# Patient Record
Sex: MALE | Race: WHITE | NOT HISPANIC OR LATINO | Employment: OTHER | URBAN - NONMETROPOLITAN AREA
[De-identification: names, ages, dates, MRNs, and addresses within clinical notes are randomized per-mention and may not be internally consistent; named-entity substitution may affect disease eponyms.]

---

## 2017-01-16 ENCOUNTER — APPOINTMENT (OUTPATIENT)
Dept: GENERAL RADIOLOGY | Facility: HOSPITAL | Age: 57
End: 2017-01-16

## 2017-01-16 ENCOUNTER — HOSPITAL ENCOUNTER (EMERGENCY)
Facility: HOSPITAL | Age: 57
Discharge: HOME | End: 2017-01-16
Admitting: NURSE PRACTITIONER

## 2017-01-16 VITALS
HEIGHT: 72 IN | HEART RATE: 89 BPM | OXYGEN SATURATION: 98 % | SYSTOLIC BLOOD PRESSURE: 145 MMHG | RESPIRATION RATE: 18 BRPM | WEIGHT: 197.25 LBS | DIASTOLIC BLOOD PRESSURE: 90 MMHG | BODY MASS INDEX: 26.72 KG/M2 | TEMPERATURE: 99.4 F

## 2017-01-16 DIAGNOSIS — IMO0002 LACERATION: Primary | ICD-10-CM

## 2017-01-16 PROCEDURE — 73120 X-RAY EXAM OF HAND: CPT

## 2017-01-16 PROCEDURE — 90715 TDAP VACCINE 7 YRS/> IM: CPT | Performed by: NURSE PRACTITIONER

## 2017-01-16 PROCEDURE — 25010000002 TDAP 5-2.5-18.5 LF-MCG/0.5 SUSPENSION: Performed by: NURSE PRACTITIONER

## 2017-01-16 PROCEDURE — 90471 IMMUNIZATION ADMIN: CPT | Performed by: NURSE PRACTITIONER

## 2017-01-16 PROCEDURE — 99282 EMERGENCY DEPT VISIT SF MDM: CPT

## 2017-01-16 RX ORDER — CETIRIZINE HYDROCHLORIDE 10 MG/1
10 TABLET ORAL DAILY
COMMUNITY

## 2017-01-16 RX ORDER — CEPHALEXIN 500 MG/1
500 CAPSULE ORAL 3 TIMES DAILY
Qty: 21 CAPSULE | Refills: 0 | Status: SHIPPED | OUTPATIENT
Start: 2017-01-16 | End: 2017-01-23

## 2017-01-16 RX ORDER — GABAPENTIN 800 MG/1
800 TABLET ORAL 4 TIMES DAILY
COMMUNITY
End: 2017-06-05

## 2017-01-16 RX ORDER — CLARITHROMYCIN 500 MG/1
500 TABLET, COATED ORAL 2 TIMES DAILY
COMMUNITY
End: 2017-06-05

## 2017-01-16 RX ORDER — NAPROXEN 500 MG/1
500 TABLET ORAL 2 TIMES DAILY PRN
Qty: 10 TABLET | Refills: 0 | Status: SHIPPED | OUTPATIENT
Start: 2017-01-16 | End: 2017-01-21

## 2017-01-16 RX ORDER — ROSUVASTATIN CALCIUM 10 MG/1
10 TABLET, COATED ORAL DAILY
COMMUNITY
End: 2021-04-05

## 2017-01-16 RX ORDER — OXYCODONE AND ACETAMINOPHEN 10; 325 MG/1; MG/1
1 TABLET ORAL EVERY 6 HOURS PRN
COMMUNITY
End: 2017-06-05

## 2017-01-16 RX ORDER — LIDOCAINE HYDROCHLORIDE 10 MG/ML
10 INJECTION, SOLUTION INFILTRATION; PERINEURAL ONCE
Status: COMPLETED | OUTPATIENT
Start: 2017-01-16 | End: 2017-01-16

## 2017-01-16 RX ORDER — HYDROCODONE BITARTRATE AND ACETAMINOPHEN 5; 325 MG/1; MG/1
1 TABLET ORAL EVERY 6 HOURS PRN
Qty: 8 TABLET | Refills: 0 | Status: SHIPPED | OUTPATIENT
Start: 2017-01-16 | End: 2017-01-18

## 2017-01-16 RX ADMIN — LIDOCAINE HYDROCHLORIDE 10 ML: 10 INJECTION, SOLUTION INFILTRATION; PERINEURAL at 10:49

## 2017-01-16 RX ADMIN — TETANUS TOXOID, REDUCED DIPHTHERIA TOXOID AND ACELLULAR PERTUSSIS VACCINE, ADSORBED 0.5 ML: 5; 2.5; 8; 8; 2.5 SUSPENSION INTRAMUSCULAR at 10:46

## 2017-01-16 NOTE — DISCHARGE INSTRUCTIONS
Please f/u with Dr. Maguire on Thursday; please call his office for an appt  R/t to ER as needed, sooner if any new or worsening symptoms  Keep affected area clean and dry, may wash with warm, soapy water. Use splint to help support finger.   Sutures will need to be removed in 7 days unless otherwise specified by Dr. Maguire.

## 2017-01-16 NOTE — ED PROVIDER NOTES
Subjective   HPI Comments: She is a 56-year-old white male who presents ER today with a laceration to the base of his right thumb.  Patient reports that he was tearing down a bookcase earlier this morning when he sustained a     Patient is a 56 y.o. male presenting with skin laceration.   History provided by:  Patient   used: No    Laceration   Location:  Finger  Finger laceration location:  R thumb  Length:  6cm  Depth:  Through dermis  Quality: jagged    Bleeding: venous    Time since incident:  1 hour  Laceration mechanism:  Nail  Pain details:     Quality:  Aching    Severity:  Mild    Timing:  Constant    Progression:  Worsening  Foreign body present:  No foreign bodies  Relieved by:  Pressure  Worsened by:  Movement  Tetanus status:  Unknown  Associated symptoms: focal weakness    Associated symptoms: no fever, no numbness, no rash, no redness, no swelling and no streaking        Review of Systems   Constitutional: Negative for fever.   HENT: Negative for drooling.    Eyes: Negative for redness and itching.   Respiratory: Negative for cough, chest tightness and shortness of breath.    Endocrine: Positive for heat intolerance.   Skin: Positive for wound. Negative for rash.   Neurological: Positive for focal weakness. Negative for numbness and headaches.   Psychiatric/Behavioral: Negative for confusion and hallucinations.   All other systems reviewed and are negative.      Past Medical History   Diagnosis Date   • Hyperlipidemia        No Known Allergies    Past Surgical History   Procedure Laterality Date   • Sinus surgery         History reviewed. No pertinent family history.    Social History     Social History   • Marital status:      Spouse name: N/A   • Number of children: N/A   • Years of education: N/A     Social History Main Topics   • Smoking status: Current Every Day Smoker   • Smokeless tobacco: None   • Alcohol use No   • Drug use: No   • Sexual activity: Defer     Other  "Topics Concern   • None     Social History Narrative   • None           Objective   Physical Exam   Constitutional: He is oriented to person, place, and time. He appears well-developed and well-nourished.   HENT:   Head: Normocephalic and atraumatic.   Eyes: Conjunctivae are normal. Pupils are equal, round, and reactive to light.   Neck: Normal range of motion. Neck supple.   Cardiovascular: Normal rate, regular rhythm and normal heart sounds.    Pulmonary/Chest: Effort normal and breath sounds normal.   Abdominal: Soft. Bowel sounds are normal.   Musculoskeletal:        Arms:  Neurological: He is alert and oriented to person, place, and time.   Skin: Skin is dry.        Psychiatric: He has a normal mood and affect.   Nursing note and vitals reviewed.      Laceration Repair  Date/Time: 1/16/2017 10:49 AM  Performed by: BHARATH SALDIVAR  Authorized by: BHARATH SALDIVAR   Consent: Verbal consent obtained. Written consent not obtained.  Risks and benefits: risks, benefits and alternatives were discussed  Consent given by: patient  Patient understanding: patient states understanding of the procedure being performed  Patient consent: the patient's understanding of the procedure matches consent given  Procedure consent: procedure consent matches procedure scheduled  Relevant documents: relevant documents present and verified  Test results: test results available and properly labeled  Site marked: the operative site was marked  Imaging studies: imaging studies available  Required items: required blood products, implants, devices, and special equipment available  Patient identity confirmed: verbally with patient and arm band  Time out: Immediately prior to procedure a \"time out\" was called to verify the correct patient, procedure, equipment, support staff and site/side marked as required.  Body area: upper extremity  Location details: right thumb  Laceration length: 6 cm  Foreign bodies: no foreign bodies  Tendon " involvement: none  Nerve involvement: none  Vascular damage: no  Anesthesia: local infiltration    Anesthesia:  Anesthesia: local infiltration  Local Anesthetic: lidocaine 1% without epinephrine   Anesthetic total: 7 mL  Sedation:  Patient sedated: no    Preparation: Patient was prepped and draped in the usual sterile fashion.  Irrigation solution: saline  Irrigation method: syringe  Amount of cleaning: standard  Debridement: none  Degree of undermining: none  Skin closure: 5-0 nylon  Number of sutures: 7  Technique: simple  Approximation: loose  Approximation difficulty: simple  Dressing: 4x4 sterile gauze and antibiotic ointment  Patient tolerance: Patient tolerated the procedure well with no immediate complications               ED Course  ED Course   Comment By Time   Right hand xray: 1. Fusion of the interphalangeal joint right thumb is noted.  2. No acute abnormalities identified.  3. Hypertrophic degenerative change PIP joint right index and right long  fingers. Deneen Thao, APRN 01/16 1120   Pt will be DC home at this time in stable cond. See procedure note for suture placement.Pt case was discussed with Dr. Maguire, surgeon on call. Did discuss with Dr. Maguire decreased sensation noted to right thumb. He advised to suture laceration loosely and for pt to f/u at his office on Thurs. Pt placed on ABX, given RX for short course of pain medication. Advised to f/u with Dr. Maguire on Thurs, given information to call for appt. Pt DC home at this time in stable cond. Deneen Thao, APRN 01/25 0757                  MDM  Number of Diagnoses or Management Options  Laceration: new and requires workup     Amount and/or Complexity of Data Reviewed  Tests in the radiology section of CPT®: ordered and reviewed  Discuss the patient with other providers: yes  Independent visualization of images, tracings, or specimens: yes    Patient Progress  Patient progress: stable      Final diagnoses:   Laceration             Deneen Thao, APRN  01/25/17 0801

## 2017-06-02 RX ORDER — TAMSULOSIN HYDROCHLORIDE 0.4 MG/1
CAPSULE ORAL
Qty: 30 CAPSULE | Refills: 0 | OUTPATIENT
Start: 2017-06-02

## 2017-06-05 ENCOUNTER — OFFICE VISIT (OUTPATIENT)
Dept: FAMILY MEDICINE CLINIC | Facility: CLINIC | Age: 57
End: 2017-06-05

## 2017-06-05 VITALS
OXYGEN SATURATION: 98 % | WEIGHT: 198.4 LBS | HEIGHT: 72 IN | TEMPERATURE: 98.1 F | HEART RATE: 82 BPM | SYSTOLIC BLOOD PRESSURE: 124 MMHG | BODY MASS INDEX: 26.87 KG/M2 | RESPIRATION RATE: 18 BRPM | DIASTOLIC BLOOD PRESSURE: 78 MMHG

## 2017-06-05 DIAGNOSIS — J01.01 ACUTE RECURRENT MAXILLARY SINUSITIS: Primary | ICD-10-CM

## 2017-06-05 PROCEDURE — 99203 OFFICE O/P NEW LOW 30 MIN: CPT | Performed by: FAMILY MEDICINE

## 2017-06-05 PROCEDURE — 96372 THER/PROPH/DIAG INJ SC/IM: CPT | Performed by: FAMILY MEDICINE

## 2017-06-05 RX ORDER — TRIAMCINOLONE ACETONIDE 55 UG/1
2 SPRAY, METERED NASAL DAILY
COMMUNITY
End: 2017-08-24

## 2017-06-05 RX ORDER — FLUTICASONE PROPIONATE 50 MCG
2 SPRAY, SUSPENSION (ML) NASAL DAILY
COMMUNITY
End: 2017-07-14 | Stop reason: SDUPTHER

## 2017-06-05 RX ORDER — AMOXICILLIN AND CLAVULANATE POTASSIUM 875; 125 MG/1; MG/1
1 TABLET, FILM COATED ORAL 2 TIMES DAILY
Qty: 20 TABLET | Refills: 0 | Status: SHIPPED | OUTPATIENT
Start: 2017-06-05 | End: 2017-07-14

## 2017-06-05 RX ORDER — DEXAMETHASONE SODIUM PHOSPHATE 10 MG/ML
10 INJECTION INTRAMUSCULAR; INTRAVENOUS ONCE
Status: COMPLETED | OUTPATIENT
Start: 2017-06-05 | End: 2017-06-05

## 2017-06-05 RX ADMIN — DEXAMETHASONE SODIUM PHOSPHATE 10 MG: 10 INJECTION INTRAMUSCULAR; INTRAVENOUS at 10:17

## 2017-06-05 NOTE — PROGRESS NOTES
Chief Complaint   Patient presents with   • Sinus Problem     Pt states that he has drainage and hurt behind both eyes and in the R ear.  Pt states that this has been going on for a couple of weeks. Pt has had 5 sinus surgeries and has been on Flonase and Nasacort for years.        History:  Shant Arevalo is a 57 y.o. male presents who today for evaluation of the above problems.  PCP currently listed as No Known Provider.   Sinus symptoms over past 2 weeks.  He has had 5 surgeries.  He has regular sinus symptoms seasonally.  This is worse this year than last year.  He is using zyrtec, flonase 2x daily.  He has never been on singulair.  He has never seen allergist.  Last surgery of sinuses 12 years ago.  Sinus pain, pressure, drainage along medial nasal bridge and bilateral ears.  He forgets about this as he is busy but sx are not getting better.  OTC sudafed is not helping much.  If he did not take it it would be markedly worse.  Not fixing symptoms but keeping sx from worsening.  Maxillary tooth discomfort.  Thickened mucus, increased drainage.  Continuous HA behind eys, frontal. 4/10 throbbing aching, non radiating.  No photphobia, no phonophobia.  Generally uses biaxin for abx.  I discussed that augmentin is typically first line agent.  Macrolides are not even second line.      Shant Arevalo  has a past medical history of Allergic; Hyperlipidemia; and Hypogonadism in male.    No Known Allergies  Past Medical History:   Diagnosis Date   • Allergic    • Hyperlipidemia    • Hypogonadism in male      Past Surgical History:   Procedure Laterality Date   • SINUS SURGERY      x5     Family History   Problem Relation Age of Onset   • Heart disease Mother    • Cancer Mother      breast   • Breast cancer Mother    • Diabetes Mother    • No Known Problems Father    • No Known Problems Brother    • Colon cancer Neg Hx    • Prostate cancer Neg Hx    • Alcohol abuse Neg Hx    • Drug abuse Neg Hx    • Thyroid cancer Neg Hx    •  Thyroid disease Neg Hx        Current Outpatient Prescriptions on File Prior to Visit   Medication Sig Dispense Refill   • cetirizine (zyrTEC) 10 MG tablet Take 10 mg by mouth Daily.     • Iron-Vitamins (GERITOL PO) Take  by mouth.     • rosuvastatin (CRESTOR) 10 MG tablet Take 10 mg by mouth Daily.     • Testosterone Cypionate 200 MG/ML kit Inject  into the shoulder, thigh, or buttocks 1 (One) Time Per Week.     • [DISCONTINUED] clarithromycin (BIAXIN) 500 MG tablet Take 500 mg by mouth 2 (Two) Times a Day.     • [DISCONTINUED] gabapentin (NEURONTIN) 800 MG tablet Take 800 mg by mouth 4 (Four) Times a Day.     • [DISCONTINUED] oxyCODONE-acetaminophen (PERCOCET)  MG per tablet Take 1 tablet by mouth Every 6 (Six) Hours As Needed for moderate pain (4-6).       No current facility-administered medications on file prior to visit.        Family history, surgical history, past medical history, Allergies and meds reviewed with patient today and updated in [facility] EMR.     ROS:  Review of Systems   Constitutional: Negative for activity change, appetite change, diaphoresis, fatigue and fever.   HENT: Positive for congestion, postnasal drip, rhinorrhea, sinus pressure, sneezing, sore throat and trouble swallowing.    Eyes: Negative for photophobia, discharge, redness, itching and visual disturbance.   Respiratory: Negative for cough, chest tightness, shortness of breath and wheezing.    Cardiovascular: Negative for chest pain and leg swelling.   Gastrointestinal: Negative for abdominal pain, constipation, diarrhea, nausea and vomiting.   Genitourinary: Negative for decreased urine volume, difficulty urinating, flank pain and hematuria.   Musculoskeletal: Negative for back pain and neck pain.   Skin: Negative for color change and rash.   Neurological: Negative for dizziness, speech difficulty, weakness, numbness and headaches.   Psychiatric/Behavioral: Negative for agitation, behavioral problems, confusion, decreased  "concentration and hallucinations. The patient is not nervous/anxious.        OBJECTIVE:  Vitals:    06/05/17 0857   BP: 124/78   BP Location: Left arm   Patient Position: Sitting   Cuff Size: Adult   Pulse: 82   Resp: 18   Temp: 98.1 °F (36.7 °C)   TempSrc: Oral   SpO2: 98%   Weight: 198 lb 6.4 oz (90 kg)   Height: 72\" (182.9 cm)     Physical Exam   Constitutional: He is oriented to person, place, and time. He appears well-developed and well-nourished.   HENT:   Head: Normocephalic and atraumatic.   Right Ear: External ear normal.   Left Ear: External ear normal.   Nose: Mucosal edema, rhinorrhea and sinus tenderness present. No nasal deformity or septal deviation. Right sinus exhibits maxillary sinus tenderness. Left sinus exhibits maxillary sinus tenderness.   Mouth/Throat: Uvula is midline. Posterior oropharyngeal erythema present. No oropharyngeal exudate, posterior oropharyngeal edema or tonsillar abscesses.   Eyes: Conjunctivae and EOM are normal. Pupils are equal, round, and reactive to light.   Neck: Normal range of motion. Neck supple.   Cardiovascular: Normal rate, regular rhythm, normal heart sounds and intact distal pulses.    Pulmonary/Chest: Effort normal and breath sounds normal. No accessory muscle usage. No apnea. No respiratory distress. He has no decreased breath sounds. He has no wheezes. He has no rhonchi. He has no rales.   Abdominal: Soft. Normal appearance and bowel sounds are normal. There is no tenderness.   Musculoskeletal: He exhibits no edema, tenderness or deformity.   Lymphadenopathy:     He has no cervical adenopathy.   Neurological: He is alert and oriented to person, place, and time.   Skin: Skin is warm and dry.   Psychiatric: He has a normal mood and affect. His behavior is normal. Judgment and thought content normal.   Vitals reviewed.      Assessment/Plan:  Acute recurrent maxillary sinusitis: Sinusitis: Acute rhinosinusitis with symptoms <4 weeks.  Ddx includes viral URI to " include corona, adeno, parainfluenza, rhinovirus, noninfectious rhinitis (vasomotor and allergic) and bacterial rhinosinusitis.  Discussed ddx and discussed when abx are recommended and when not and discussed treatment options with patient today. Reviewed typically no abx are recommended until day 7-10.  Offered deferred abx as best line if sx present <7 days.   Allergies to medications and abx reviewed.  The patient voiced understanding and agrees to listed therapy.  Steroid injection R/B/A d/w patient and offered to be given today.  Steroids by mouth discussed as well.  Discussed benefits of nasal steroid and to consider daily second gen antihistamine.  Discussed risks/benefits of OTC decongestants.  Caution with blood pressure.  Consider netti pot. f/u in 1-2 weeks if not improving.  a. Reviewed R/B/A Injections d/w patient.   b. Offered handout on sinus infections to patient.  c. Allergy recommendations discussed.  Would change pillowcases and wash with hot/dry hot 2x weekly and change sheets minimum weekly. Consider anti-allergenic coverings for sheets/pillowcases.  Avoid tobacco, Keep pets out of room of sleeping as able.  Use home circulation instead of windows down.  Nasal steroids discussed today.  d. Risks/benefits of abx and steroids discussed with patient today.  i. Handouts offered on medications.   ii. Take abx with food to decrease risks of diarrhea. Increased yogurt to decrease this risk further  iii. Consider probiotics.  iv. Decadron, dexamethasone, methylprednisolone, prednisone. Pt notified of potential pros/risks of steroid treatment including rapid improvement of condition; allergic reaction, psychologic reaction (depression, anxiety & insomnia), skin change at injection site (color, dimpling), muscle weakness, changes in blood sugar, cataracts/glaucoma, AVN.  This list is not all inclusive and patient is aware they may refuse treatment.  e. Post infectious cough discussed with patient. Sx may  last 21 days after URI/Sinus infection.  They can call if needed over next 1 week and we will consider proair/Ventolin and Tessalon.  For now no medications.  f. Nasal GC d/w patient. R/B/A to meds d/w patient, SE reviewed, handout offered from [facility] regarding medications listed.       ORDERS:  -     amoxicillin-clavulanate (AUGMENTIN) 875-125 MG per tablet; Take 1 tablet by mouth 2 (Two) Times a Day.  -     dexamethasone (DECADRON) injection 10 mg; Inject 1 mL into the shoulder, thigh, or buttocks 1 (One) Time.    BMI 26.0-26.9,adult: Discussed to monitor portions, monitor activity.  He has weighed this a long time.  He was up to 230-240 historically lifting weights and has been around 200 after stopping.  He is not interested in any w/u for now.       Risks/benefits of current and new medications discussed with the patient and or family today.  The patient/family are aware and accept that if there any side effects they should call or return to clinic as soon as possible.  Appropriate F/U discussed for topics addressed today. All questions were answered to the satisfactory state of patient/family.  Should symptoms fail to improve or worsen they agree to call or return to clinic or to go to the ER. Education handouts were offered on any new Rx if requested.  Discussed the importance of following up with any needed screening tests/labs/specialist appointments and any requested follow-up recommended by me today.  Importance of maintaining follow-up discussed and patient accepts that missed appointments can delay diagnosis and potentially lead to worsening of conditions.    An After Visit Summary was printed and given to the patient at discharge.  Follow-up: Return if symptoms worsen or fail to improve, for F/U in 3-4 months for well visit. , Annual physical.         Napoleon Regan M.D. 6/5/2017

## 2017-06-05 NOTE — PATIENT INSTRUCTIONS
Sinusitis, Adult  Sinusitis is soreness and inflammation of your sinuses. Sinuses are hollow spaces in the bones around your face. Your sinuses are located:  · Around your eyes.  · In the middle of your forehead.  · Behind your nose.  · In your cheekbones.  Your sinuses and nasal passages are lined with a stringy fluid (mucus). Mucus normally drains out of your sinuses. When your nasal tissues become inflamed or swollen, the mucus can become trapped or blocked so air cannot flow through your sinuses. This allows bacteria, viruses, and funguses to grow, which leads to infection.  Sinusitis can develop quickly and last for 7-10 days (acute) or for more than 12 weeks (chronic). Sinusitis often develops after a cold.  CAUSES  This condition is caused by anything that creates swelling in the sinuses or stops mucus from draining, including:  · Allergies.  · Asthma.  · Bacterial or viral infection.  · Abnormally shaped bones between the nasal passages.  · Nasal growths that contain mucus (nasal polyps).  · Narrow sinus openings.  · Pollutants, such as chemicals or irritants in the air.  · A foreign object stuck in the nose.  · A fungal infection. This is rare.  RISK FACTORS  The following factors may make you more likely to develop this condition:  · Having allergies or asthma.  · Having had a recent cold or respiratory tract infection.  · Having structural deformities or blockages in your nose or sinuses.  · Having a weak immune system.  · Doing a lot of swimming or diving.  · Overusing nasal sprays.  · Smoking.  SYMPTOMS  The main symptoms of this condition are pain and a feeling of pressure around the affected sinuses. Other symptoms include:  · Upper toothache.  · Earache.  · Headache.  · Bad breath.  · Decreased sense of smell and taste.  · A cough that may get worse at night.  · Fatigue.  · Fever.  · Thick drainage from your nose. The drainage is often green and it may contain pus (purulent).  · Stuffy nose or  congestion.  · Postnasal drip. This is when extra mucus collects in the throat or back of the nose.  · Swelling and warmth over the affected sinuses.  · Sore throat.  · Sensitivity to light.  DIAGNOSIS  This condition is diagnosed based on symptoms, a medical history, and a physical exam. To find out if your condition is acute or chronic, your health care provider may:  · Look in your nose for signs of nasal polyps.  · Tap over the affected sinus to check for signs of infection.  · View the inside of your sinuses using an imaging device that has a light attached (endoscope).  If your health care provider suspects that you have chronic sinusitis, you may also:  · Be tested for allergies.  · Have a sample of mucus taken from your nose (nasal culture) and checked for bacteria.  · Have a mucus sample examined to see if your sinusitis is related to an allergy.  If your sinusitis does not respond to treatment and it lasts longer than 8 weeks, you may have an MRI or CT scan to check your sinuses. These scans also help to determine how severe your infection is.  In rare cases, a bone biopsy may be done to rule out more serious types of fungal sinus disease.  TREATMENT  Treatment for sinusitis depends on the cause and whether your condition is chronic or acute. If a virus is causing your sinusitis, your symptoms will go away on their own within 10 days. You may be given medicines to relieve your symptoms, including:  · Topical nasal decongestants. They shrink swollen nasal passages and let mucus drain from your sinuses.  · Antihistamines. These drugs block inflammation that is triggered by allergies. This can help to ease swelling in your nose and sinuses.  · Topical nasal corticosteroids. These are nasal sprays that ease inflammation and swelling in your nose and sinuses.  · Nasal saline washes. These rinses can help to get rid of thick mucus in your nose.  If your condition is caused by bacteria, you will be given an  antibiotic medicine. If your condition is caused by a fungus, you will be given an antifungal medicine.  Surgery may be needed to correct underlying conditions, such as narrow nasal passages. Surgery may also be needed to remove polyps.  HOME CARE INSTRUCTIONS  Medicines  · Take, use, or apply over-the-counter and prescription medicines only as told by your health care provider. These may include nasal sprays.  · If you were prescribed an antibiotic medicine, take it as told by your health care provider. Do not stop taking the antibiotic even if you start to feel better.  Hydrate and Humidify  · Drink enough water to keep your urine clear or pale yellow. Staying hydrated will help to thin your mucus.  · Use a cool mist humidifier to keep the humidity level in your home above 50%.  · Inhale steam for 10-15 minutes, 3-4 times a day or as told by your health care provider. You can do this in the bathroom while a hot shower is running.  · Limit your exposure to cool or dry air.  Rest  · Rest as much as possible.  · Sleep with your head raised (elevated).  · Make sure to get enough sleep each night.  General Instructions  · Apply a warm, moist washcloth to your face 3-4 times a day or as told by your health care provider. This will help with discomfort.  · Wash your hands often with soap and water to reduce your exposure to viruses and other germs. If soap and water are not available, use hand .  · Do not smoke. Avoid being around people who are smoking (secondhand smoke).  · Keep all follow-up visits as told by your health care provider. This is important.  SEEK MEDICAL CARE IF:  · You have a fever.  · Your symptoms get worse.  · Your symptoms do not improve within 10 days.  SEEK IMMEDIATE MEDICAL CARE IF:  · You have a severe headache.  · You have persistent vomiting.  · You have pain or swelling around your face or eyes.  · You have vision problems.  · You develop confusion.  · Your neck is stiff.  · You have  trouble breathing.     This information is not intended to replace advice given to you by your health care provider. Make sure you discuss any questions you have with your health care provider.     Document Released: 12/18/2006 Document Revised: 04/10/2017 Document Reviewed: 10/12/2016  [employer] Interactive Patient Education ©2017 [employer]  Amoxicillin; Clavulanic Acid tablets  What is this medicine?  AMOXICILLIN; CLAVULANIC ACID (a mox i RENITA in; MANNY juanabrigida kiser ic AS id) is a penicillin antibiotic. It is used to treat certain kinds of bacterial infections. It will not work for colds, flu, or other viral infections.  This medicine may be used for other purposes; ask your health care provider or pharmacist if you have questions.  COMMON BRAND NAME(S): Augmentin  What should I tell my health care provider before I take this medicine?  They need to know if you have any of these conditions:  -bowel disease, like colitis  -kidney disease  -liver disease  -mononucleosis  -an unusual or allergic reaction to amoxicillin, penicillin, cephalosporin, other antibiotics, clavulanic acid, other medicines, foods, dyes, or preservatives  -pregnant or trying to get pregnant  -breast-feeding  How should I use this medicine?  Take this medicine by mouth with a full glass of water. Follow the directions on the prescription label. Take at the start of a meal. Do not crush or chew. If the tablet has a score line, you may cut it in half at the score line for easier swallowing. Take your medicine at regular intervals. Do not take your medicine more often than directed. Take all of your medicine as directed even if you think you are better. Do not skip doses or stop your medicine early.  Talk to your pediatrician regarding the use of this medicine in children. Special care may be needed.  Overdosage: If you think you have taken too much of this medicine contact a poison control center or emergency room at once.  NOTE: This medicine is  only for you. Do not share this medicine with others.  What if I miss a dose?  If you miss a dose, take it as soon as you can. If it is almost time for your next dose, take only that dose. Do not take double or extra doses.  What may interact with this medicine?  -allopurinol  -anticoagulants  -birth control pills  -methotrexate  -probenecid  This list may not describe all possible interactions. Give your health care provider a list of all the medicines, herbs, non-prescription drugs, or dietary supplements you use. Also tell them if you smoke, drink alcohol, or use illegal drugs. Some items may interact with your medicine.  What should I watch for while using this medicine?  Tell your doctor or health care professional if your symptoms do not improve.  Do not treat diarrhea with over the counter products. Contact your doctor if you have diarrhea that lasts more than 2 days or if it is severe and watery.  If you have diabetes, you may get a false-positive result for sugar in your urine. Check with your doctor or health care professional.  Birth control pills may not work properly while you are taking this medicine. Talk to your doctor about using an extra method of birth control.  What side effects may I notice from receiving this medicine?  Side effects that you should report to your doctor or health care professional as soon as possible:  -allergic reactions like skin rash, itching or hives, swelling of the face, lips, or tongue  -breathing problems  -dark urine  -fever or chills, sore throat  -redness, blistering, peeling or loosening of the skin, including inside the mouth  -seizures  -trouble passing urine or change in the amount of urine  -unusual bleeding, bruising  -unusually weak or tired  -white patches or sores in the mouth or throat  Side effects that usually do not require medical attention (report to your doctor or health care professional if they continue or are  bothersome):  -diarrhea  -dizziness  -headache  -nausea, vomiting  -stomach upset  -vaginal or anal irritation  This list may not describe all possible side effects. Call your doctor for medical advice about side effects. You may report side effects to FDA at 2-242-FDA-4429.  Where should I keep my medicine?  Keep out of the reach of children.  Store at room temperature below 25 degrees C (77 degrees F). Keep container tightly closed. Throw away any unused medicine after the expiration date.  NOTE: This sheet is a summary. It may not cover all possible information. If you have questions about this medicine, talk to your doctor, pharmacist, or health care provider.     © 2017, [employer]. (2009-03-12 12:04:30)

## 2017-07-14 ENCOUNTER — OFFICE VISIT (OUTPATIENT)
Dept: FAMILY MEDICINE CLINIC | Facility: CLINIC | Age: 57
End: 2017-07-14

## 2017-07-14 VITALS
HEART RATE: 83 BPM | RESPIRATION RATE: 16 BRPM | WEIGHT: 199 LBS | BODY MASS INDEX: 26.95 KG/M2 | SYSTOLIC BLOOD PRESSURE: 132 MMHG | TEMPERATURE: 97.9 F | OXYGEN SATURATION: 97 % | DIASTOLIC BLOOD PRESSURE: 80 MMHG | HEIGHT: 72 IN

## 2017-07-14 DIAGNOSIS — J01.00 ACUTE NON-RECURRENT MAXILLARY SINUSITIS: Primary | ICD-10-CM

## 2017-07-14 PROCEDURE — 96372 THER/PROPH/DIAG INJ SC/IM: CPT | Performed by: NURSE PRACTITIONER

## 2017-07-14 PROCEDURE — 99214 OFFICE O/P EST MOD 30 MIN: CPT | Performed by: NURSE PRACTITIONER

## 2017-07-14 RX ORDER — DEXAMETHASONE SODIUM PHOSPHATE 10 MG/ML
10 INJECTION INTRAMUSCULAR; INTRAVENOUS ONCE
Status: COMPLETED | OUTPATIENT
Start: 2017-07-14 | End: 2017-07-14

## 2017-07-14 RX ORDER — FLUTICASONE PROPIONATE 50 MCG
2 SPRAY, SUSPENSION (ML) NASAL DAILY
Qty: 1 EACH | Refills: 5 | Status: SHIPPED | OUTPATIENT
Start: 2017-07-14 | End: 2017-12-04 | Stop reason: SDUPTHER

## 2017-07-14 RX ORDER — AMOXICILLIN AND CLAVULANATE POTASSIUM 875; 125 MG/1; MG/1
1 TABLET, FILM COATED ORAL 2 TIMES DAILY
Qty: 14 TABLET | Refills: 0 | Status: SHIPPED | OUTPATIENT
Start: 2017-07-14 | End: 2017-07-21

## 2017-07-14 RX ADMIN — DEXAMETHASONE SODIUM PHOSPHATE 10 MG: 10 INJECTION INTRAMUSCULAR; INTRAVENOUS at 10:50

## 2017-07-14 NOTE — PROGRESS NOTES
Chief Complaint   Patient presents with   • Earache     right   • Sinusitis     lot of drainage       No Known Allergies    History provided by: self     HPI:  Subjective   Shant Arevalo is a 57 y.o. male presents for complains of right ear pain, and sinus congestion for 3 weeks.  Has been using flonase, zyrtec, but not helping.  Rates pain 7/10.  Denies any fever or chills.  Has hyperlipidemia stable with rosuvastatin, hypogonadism stable with testosterone.          PCP currently listed as Napoleon Regan MD.     The following portions of the patient's history were reviewed and updated as appropriate: allergies, current medications, past family history, past medical history, past social history, past surgical history and problem list.    Past Medical History:   Diagnosis Date   • Allergic    • Hyperlipidemia    • Hypogonadism in male      Past Surgical History:   Procedure Laterality Date   • SINUS SURGERY      x5     Social History     Social History   • Marital status:      Spouse name: N/A   • Number of children: N/A   • Years of education: N/A     Social History Main Topics   • Smoking status: Never Smoker   • Smokeless tobacco: Never Used   • Alcohol use Yes      Comment: occasional   • Drug use: No   • Sexual activity: Yes     Partners: Female     Other Topics Concern   • None     Social History Narrative    Retired [place] [employer].  [employer] and [employer].      Family History   Problem Relation Age of Onset   • Heart disease Mother    • Cancer Mother      breast   • Breast cancer Mother    • Diabetes Mother    • No Known Problems Father    • No Known Problems Brother    • Colon cancer Neg Hx    • Prostate cancer Neg Hx    • Alcohol abuse Neg Hx    • Drug abuse Neg Hx    • Thyroid cancer Neg Hx    • Thyroid disease Neg Hx        REVIEW OF SYMPTOMS:  BOLD indicates positive for ROS  General:  weight loss, fever, chills, appetite loss  SKIN: change in wart/mole, itching, rash, new  "lesions, nail changes  HEENT:   ear pain, decreased hearing, sore throat, sinus pressure, blurry vision, eye pain, dry eyes, tinnitus  Respiratory: cough, difficulty breathing, wheezing, hemoptysis   Cardiovascular:  chest pain, shortness of breath, swelling of extremities, syncope  Gastro: abdominal pain, constipation, nausea, vomiting, diarrhea, hematemesis  Neuro:  headache, tremors, numbness, memory loss, irritability, dizziness  Hematology: Denies bruising, has enlarged lymph nodes  Allergic/immune: Denies allergic rhinitis, hay fever, asthma, COPD, hives  \"All other systems reviewed and negative, except as listed above.”      OBJECTIVE:  Chart sinus and ear exam    Constitutional:  Alert, oriented x 3, well developed, well nourished. Consistent with stated age. Not in acute distress.  Has normal posture. Gait and station normal.  Behavior appropriate. Patient is pleasant and cooperative with the interview and exam.    Skin: No rashes, no visible scars or suspicious moles noted.  Skin is warm to touch. Normal appropriate skin turgor.  Capillary refill is normal bilateral Upper and lower extremity.     Head/Neck: Head is normocephalic and atraumatic.  Neck without visible/palpable lumps.  No thyromegaly.    Eye: Bilaterally EOMI.  PERRLA.  Sclera/conjunctiva is normal, no discharge. Cornea is normal and clear. Lens is normal.  Eyeball normal. Upper eyelid normal.  Lower eyelid normal.     OROPHARYNX: Mucosa pink and moist, no abnormalities. Dentition average for age. No obvious dental carries. No lesions. Tongue normal.    POSTERIOR PHARNYX: without redness or post nasal drip, no exudate, no irregularities, tonsils normal    EARS:  Rt AUDITORY CANAL: Normal: without redness or excess cerumen, no impaction  Lt AUDITORY CANAL: Normal: without redness or excess cerumen, no impaction  Rt TYMPANIC MEMBRANE:  Abnormal:  TM dull TM not  Bulging   Lt TYMPANIC MEMBRANE:  Normal: TM pearly gray with good cone of light and " "landmarks, no bulging.    NOSE: External appearance normal/midline Bilateral nares boggy, with purulent discharge     SINUS:  Frontal and maxillary sinuses tenderness on palpation    CHEST/LUNG: No use of accessory muscles, chest non-tender on palpation.  Breath sounds normal throughout all lung fields.  No wheezes, rales, rhonchi.    CARDIOVASCULAR:  Inspection: Carotid artery bilateral normal, no bruits, pulse regular.  Palpation/Percussion Bilateral normal pulsations.  Auscultation: Regular rate and rhythm. No murmur noted in sitting, supine, standing or squatting positions.    NEUROLOGIC: Cranial nerves individually evaluated II-XII and intact. Normal EOMI, visual/special senses appear intact, Face is symmetrical and normal sensation/movement, normal tongue, normal strength/posture of neck musculature. Balance/strength/Romberg intact.  Moves all 4 extremities symmetrically..      NEURO PSYCHIATRIC: Appropriate mood, and affect. Articulates well with normal speech.  No evidence of hallucinations, delusions, obessions, no suicidal or homicidal ideations    LYMPH: Anterior Cervical Nodes-enlarged and tender Posterior Cervical Nodes: normal, size; non-tender to palpation. Submental Nodes: normal, size; non-tender to palpation.  Submandibular Nodes: normal, size; non-tender to palpation.     /80  Pulse 83  Temp 97.9 °F (36.6 °C)  Resp 16  Ht 72\" (182.9 cm)  Wt 199 lb (90.3 kg)  SpO2 97%  BMI 26.99 kg/m2    Assessment:   Shant was seen today for earache and sinusitis.    Diagnoses and all orders for this visit:    Acute non-recurrent maxillary sinusitis  -     amoxicillin-clavulanate (AUGMENTIN) 875-125 MG per tablet; Take 1 tablet by mouth 2 (Two) Times a Day for 7 days.  -     fluticasone (FLONASE) 50 MCG/ACT nasal spray; 2 sprays into each nostril Daily.  -     dexamethasone (DECADRON) injection 10 mg; Inject 1 mL into the shoulder, thigh, or buttocks 1 (One) Time.      Tylenol/ibuprofen. "   Acetaminophen.  Follow package insert. Discussed risks/benefits of acetaminophen.  Do not take more than 2000 mg Tylenol per day. Discussed recent changes by FDA for risks of MI.  Caution advised with combination medications. Watch for excess tylenol (acetaminophen) and is present in numerous over the counter combination medications.  Also be aware of ingredients as these can be duplicated in combination medications if taking various types together.  If questions check with pharmacist or call.  >6months of ageFor NSAIDS follow package insert. NSAIDs work by blocking natural substances that cause inflammation.   Discussed risks benefits of Ibuprofen/Aleve/Diclofenac/Mobic for pain. Reviewed recent FDA changes regarding increased risks for MI. The patient is aware of risks.  GI Prophylaxis discussed in relation to use of steroids and or NSAIDS.  Discussed NSAIDS may rarely increase risk for MI/stroke, which may be greater if heart disease is present, tobacco use or diabetic for example.  Rx may increase risks of GI bleed, platelet dysfunction that can occur at any time. May increase risks of kidney damage/disease.  Should not use before or after CABG or major surgery without direction. Side effects can include dizziness, drowsiness, HA upset stomach to name a few.  If any severe symptoms develop please call or f/u in clinic.    Offered handout on AOM to patient/family today.    Handout on medications offered to patient/family today.    Return in about 1 week (around 7/21/2017), or if symptoms worsen or fail to improve with Dr. Shereen Arevalo, DNP, APRN-BC  07/14/2017

## 2017-08-24 ENCOUNTER — OFFICE VISIT (OUTPATIENT)
Dept: FAMILY MEDICINE CLINIC | Facility: CLINIC | Age: 57
End: 2017-08-24

## 2017-08-24 VITALS
OXYGEN SATURATION: 98 % | RESPIRATION RATE: 16 BRPM | BODY MASS INDEX: 26.95 KG/M2 | HEART RATE: 99 BPM | WEIGHT: 199 LBS | TEMPERATURE: 98.2 F | HEIGHT: 72 IN | DIASTOLIC BLOOD PRESSURE: 80 MMHG | SYSTOLIC BLOOD PRESSURE: 132 MMHG

## 2017-08-24 DIAGNOSIS — J01.01 ACUTE RECURRENT MAXILLARY SINUSITIS: Primary | ICD-10-CM

## 2017-08-24 DIAGNOSIS — G47.00 INSOMNIA, UNSPECIFIED TYPE: ICD-10-CM

## 2017-08-24 DIAGNOSIS — F41.9 ANXIETY: ICD-10-CM

## 2017-08-24 PROCEDURE — 96372 THER/PROPH/DIAG INJ SC/IM: CPT | Performed by: FAMILY MEDICINE

## 2017-08-24 PROCEDURE — 99213 OFFICE O/P EST LOW 20 MIN: CPT | Performed by: FAMILY MEDICINE

## 2017-08-24 RX ORDER — CLARITHROMYCIN 500 MG/1
500 TABLET, COATED ORAL 2 TIMES DAILY
Qty: 14 TABLET | Refills: 0 | Status: SHIPPED | OUTPATIENT
Start: 2017-08-24 | End: 2017-09-28

## 2017-08-24 RX ORDER — DEXAMETHASONE SODIUM PHOSPHATE 10 MG/ML
10 INJECTION INTRAMUSCULAR; INTRAVENOUS ONCE
Status: COMPLETED | OUTPATIENT
Start: 2017-08-24 | End: 2017-08-24

## 2017-08-24 RX ADMIN — DEXAMETHASONE SODIUM PHOSPHATE 10 MG: 10 INJECTION INTRAMUSCULAR; INTRAVENOUS at 12:19

## 2017-08-24 NOTE — PATIENT INSTRUCTIONS
Sinusitis, Adult  Sinusitis is soreness and inflammation of your sinuses. Sinuses are hollow spaces in the bones around your face. Your sinuses are located:  · Around your eyes.  · In the middle of your forehead.  · Behind your nose.  · In your cheekbones.  Your sinuses and nasal passages are lined with a stringy fluid (mucus). Mucus normally drains out of your sinuses. When your nasal tissues become inflamed or swollen, the mucus can become trapped or blocked so air cannot flow through your sinuses. This allows bacteria, viruses, and funguses to grow, which leads to infection.  Sinusitis can develop quickly and last for 7-10 days (acute) or for more than 12 weeks (chronic). Sinusitis often develops after a cold.  CAUSES  This condition is caused by anything that creates swelling in the sinuses or stops mucus from draining, including:  · Allergies.  · Asthma.  · Bacterial or viral infection.  · Abnormally shaped bones between the nasal passages.  · Nasal growths that contain mucus (nasal polyps).  · Narrow sinus openings.  · Pollutants, such as chemicals or irritants in the air.  · A foreign object stuck in the nose.  · A fungal infection. This is rare.  RISK FACTORS  The following factors may make you more likely to develop this condition:  · Having allergies or asthma.  · Having had a recent cold or respiratory tract infection.  · Having structural deformities or blockages in your nose or sinuses.  · Having a weak immune system.  · Doing a lot of swimming or diving.  · Overusing nasal sprays.  · Smoking.  SYMPTOMS  The main symptoms of this condition are pain and a feeling of pressure around the affected sinuses. Other symptoms include:  · Upper toothache.  · Earache.  · Headache.  · Bad breath.  · Decreased sense of smell and taste.  · A cough that may get worse at night.  · Fatigue.  · Fever.  · Thick drainage from your nose. The drainage is often green and it may contain pus (purulent).  · Stuffy nose or  congestion.  · Postnasal drip. This is when extra mucus collects in the throat or back of the nose.  · Swelling and warmth over the affected sinuses.  · Sore throat.  · Sensitivity to light.  DIAGNOSIS  This condition is diagnosed based on symptoms, a medical history, and a physical exam. To find out if your condition is acute or chronic, your health care provider may:  · Look in your nose for signs of nasal polyps.  · Tap over the affected sinus to check for signs of infection.  · View the inside of your sinuses using an imaging device that has a light attached (endoscope).  If your health care provider suspects that you have chronic sinusitis, you may also:  · Be tested for allergies.  · Have a sample of mucus taken from your nose (nasal culture) and checked for bacteria.  · Have a mucus sample examined to see if your sinusitis is related to an allergy.  If your sinusitis does not respond to treatment and it lasts longer than 8 weeks, you may have an MRI or CT scan to check your sinuses. These scans also help to determine how severe your infection is.  In rare cases, a bone biopsy may be done to rule out more serious types of fungal sinus disease.  TREATMENT  Treatment for sinusitis depends on the cause and whether your condition is chronic or acute. If a virus is causing your sinusitis, your symptoms will go away on their own within 10 days. You may be given medicines to relieve your symptoms, including:  · Topical nasal decongestants. They shrink swollen nasal passages and let mucus drain from your sinuses.  · Antihistamines. These drugs block inflammation that is triggered by allergies. This can help to ease swelling in your nose and sinuses.  · Topical nasal corticosteroids. These are nasal sprays that ease inflammation and swelling in your nose and sinuses.  · Nasal saline washes. These rinses can help to get rid of thick mucus in your nose.  If your condition is caused by bacteria, you will be given an  antibiotic medicine. If your condition is caused by a fungus, you will be given an antifungal medicine.  Surgery may be needed to correct underlying conditions, such as narrow nasal passages. Surgery may also be needed to remove polyps.  HOME CARE INSTRUCTIONS  Medicines  · Take, use, or apply over-the-counter and prescription medicines only as told by your health care provider. These may include nasal sprays.  · If you were prescribed an antibiotic medicine, take it as told by your health care provider. Do not stop taking the antibiotic even if you start to feel better.  Hydrate and Humidify  · Drink enough water to keep your urine clear or pale yellow. Staying hydrated will help to thin your mucus.  · Use a cool mist humidifier to keep the humidity level in your home above 50%.  · Inhale steam for 10-15 minutes, 3-4 times a day or as told by your health care provider. You can do this in the bathroom while a hot shower is running.  · Limit your exposure to cool or dry air.  Rest  · Rest as much as possible.  · Sleep with your head raised (elevated).  · Make sure to get enough sleep each night.  General Instructions  · Apply a warm, moist washcloth to your face 3-4 times a day or as told by your health care provider. This will help with discomfort.  · Wash your hands often with soap and water to reduce your exposure to viruses and other germs. If soap and water are not available, use hand .  · Do not smoke. Avoid being around people who are smoking (secondhand smoke).  · Keep all follow-up visits as told by your health care provider. This is important.  SEEK MEDICAL CARE IF:  · You have a fever.  · Your symptoms get worse.  · Your symptoms do not improve within 10 days.  SEEK IMMEDIATE MEDICAL CARE IF:  · You have a severe headache.  · You have persistent vomiting.  · You have pain or swelling around your face or eyes.  · You have vision problems.  · You develop confusion.  · Your neck is stiff.  · You have  trouble breathing.     This information is not intended to replace advice given to you by your health care provider. Make sure you discuss any questions you have with your health care provider.     Document Released: 12/18/2006 Document Revised: 04/10/2017 Document Reviewed: 10/12/2016  [employer] Interactive Patient Education ©2017 [employer]    Sinusitis, Adult  Sinusitis is soreness and inflammation of your sinuses. Sinuses are hollow spaces in the bones around your face. Your sinuses are located:  · Around your eyes.  · In the middle of your forehead.  · Behind your nose.  · In your cheekbones.  Your sinuses and nasal passages are lined with a stringy fluid (mucus). Mucus normally drains out of your sinuses. When your nasal tissues become inflamed or swollen, the mucus can become trapped or blocked so air cannot flow through your sinuses. This allows bacteria, viruses, and funguses to grow, which leads to infection.  Sinusitis can develop quickly and last for 7-10 days (acute) or for more than 12 weeks (chronic). Sinusitis often develops after a cold.  CAUSES  This condition is caused by anything that creates swelling in the sinuses or stops mucus from draining, including:  · Allergies.  · Asthma.  · Bacterial or viral infection.  · Abnormally shaped bones between the nasal passages.  · Nasal growths that contain mucus (nasal polyps).  · Narrow sinus openings.  · Pollutants, such as chemicals or irritants in the air.  · A foreign object stuck in the nose.  · A fungal infection. This is rare.  RISK FACTORS  The following factors may make you more likely to develop this condition:  · Having allergies or asthma.  · Having had a recent cold or respiratory tract infection.  · Having structural deformities or blockages in your nose or sinuses.  · Having a weak immune system.  · Doing a lot of swimming or diving.  · Overusing nasal sprays.  · Smoking.  SYMPTOMS  The main symptoms of this condition are pain and a  feeling of pressure around the affected sinuses. Other symptoms include:  · Upper toothache.  · Earache.  · Headache.  · Bad breath.  · Decreased sense of smell and taste.  · A cough that may get worse at night.  · Fatigue.  · Fever.  · Thick drainage from your nose. The drainage is often green and it may contain pus (purulent).  · Stuffy nose or congestion.  · Postnasal drip. This is when extra mucus collects in the throat or back of the nose.  · Swelling and warmth over the affected sinuses.  · Sore throat.  · Sensitivity to light.  DIAGNOSIS  This condition is diagnosed based on symptoms, a medical history, and a physical exam. To find out if your condition is acute or chronic, your health care provider may:  · Look in your nose for signs of nasal polyps.  · Tap over the affected sinus to check for signs of infection.  · View the inside of your sinuses using an imaging device that has a light attached (endoscope).  If your health care provider suspects that you have chronic sinusitis, you may also:  · Be tested for allergies.  · Have a sample of mucus taken from your nose (nasal culture) and checked for bacteria.  · Have a mucus sample examined to see if your sinusitis is related to an allergy.  If your sinusitis does not respond to treatment and it lasts longer than 8 weeks, you may have an MRI or CT scan to check your sinuses. These scans also help to determine how severe your infection is.  In rare cases, a bone biopsy may be done to rule out more serious types of fungal sinus disease.  TREATMENT  Treatment for sinusitis depends on the cause and whether your condition is chronic or acute. If a virus is causing your sinusitis, your symptoms will go away on their own within 10 days. You may be given medicines to relieve your symptoms, including:  · Topical nasal decongestants. They shrink swollen nasal passages and let mucus drain from your sinuses.  · Antihistamines. These drugs block inflammation that is  triggered by allergies. This can help to ease swelling in your nose and sinuses.  · Topical nasal corticosteroids. These are nasal sprays that ease inflammation and swelling in your nose and sinuses.  · Nasal saline washes. These rinses can help to get rid of thick mucus in your nose.  If your condition is caused by bacteria, you will be given an antibiotic medicine. If your condition is caused by a fungus, you will be given an antifungal medicine.  Surgery may be needed to correct underlying conditions, such as narrow nasal passages. Surgery may also be needed to remove polyps.  HOME CARE INSTRUCTIONS  Medicines  · Take, use, or apply over-the-counter and prescription medicines only as told by your health care provider. These may include nasal sprays.  · If you were prescribed an antibiotic medicine, take it as told by your health care provider. Do not stop taking the antibiotic even if you start to feel better.  Hydrate and Humidify  · Drink enough water to keep your urine clear or pale yellow. Staying hydrated will help to thin your mucus.  · Use a cool mist humidifier to keep the humidity level in your home above 50%.  · Inhale steam for 10-15 minutes, 3-4 times a day or as told by your health care provider. You can do this in the bathroom while a hot shower is running.  · Limit your exposure to cool or dry air.  Rest  · Rest as much as possible.  · Sleep with your head raised (elevated).  · Make sure to get enough sleep each night.  General Instructions  · Apply a warm, moist washcloth to your face 3-4 times a day or as told by your health care provider. This will help with discomfort.  · Wash your hands often with soap and water to reduce your exposure to viruses and other germs. If soap and water are not available, use hand .  · Do not smoke. Avoid being around people who are smoking (secondhand smoke).  · Keep all follow-up visits as told by your health care provider. This is important.  SEEK  MEDICAL CARE IF:  · You have a fever.  · Your symptoms get worse.  · Your symptoms do not improve within 10 days.  SEEK IMMEDIATE MEDICAL CARE IF:  · You have a severe headache.  · You have persistent vomiting.  · You have pain or swelling around your face or eyes.  · You have vision problems.  · You develop confusion.  · Your neck is stiff.  · You have trouble breathing.     This information is not intended to replace advice given to you by your health care provider. Make sure you discuss any questions you have with your health care provider.     Document Released: 12/18/2006 Document Revised: 04/10/2017 Document Reviewed: 10/12/2016  [employer] Interactive Patient Education ©2017 [employer]    Sinusitis, Adult  Sinusitis is soreness and inflammation of your sinuses. Sinuses are hollow spaces in the bones around your face. Your sinuses are located:  · Around your eyes.  · In the middle of your forehead.  · Behind your nose.  · In your cheekbones.  Your sinuses and nasal passages are lined with a stringy fluid (mucus). Mucus normally drains out of your sinuses. When your nasal tissues become inflamed or swollen, the mucus can become trapped or blocked so air cannot flow through your sinuses. This allows bacteria, viruses, and funguses to grow, which leads to infection.  Sinusitis can develop quickly and last for 7-10 days (acute) or for more than 12 weeks (chronic). Sinusitis often develops after a cold.  CAUSES  This condition is caused by anything that creates swelling in the sinuses or stops mucus from draining, including:  · Allergies.  · Asthma.  · Bacterial or viral infection.  · Abnormally shaped bones between the nasal passages.  · Nasal growths that contain mucus (nasal polyps).  · Narrow sinus openings.  · Pollutants, such as chemicals or irritants in the air.  · A foreign object stuck in the nose.  · A fungal infection. This is rare.  RISK FACTORS  The following factors may make you more likely to  develop this condition:  · Having allergies or asthma.  · Having had a recent cold or respiratory tract infection.  · Having structural deformities or blockages in your nose or sinuses.  · Having a weak immune system.  · Doing a lot of swimming or diving.  · Overusing nasal sprays.  · Smoking.  SYMPTOMS  The main symptoms of this condition are pain and a feeling of pressure around the affected sinuses. Other symptoms include:  · Upper toothache.  · Earache.  · Headache.  · Bad breath.  · Decreased sense of smell and taste.  · A cough that may get worse at night.  · Fatigue.  · Fever.  · Thick drainage from your nose. The drainage is often green and it may contain pus (purulent).  · Stuffy nose or congestion.  · Postnasal drip. This is when extra mucus collects in the throat or back of the nose.  · Swelling and warmth over the affected sinuses.  · Sore throat.  · Sensitivity to light.  DIAGNOSIS  This condition is diagnosed based on symptoms, a medical history, and a physical exam. To find out if your condition is acute or chronic, your health care provider may:  · Look in your nose for signs of nasal polyps.  · Tap over the affected sinus to check for signs of infection.  · View the inside of your sinuses using an imaging device that has a light attached (endoscope).  If your health care provider suspects that you have chronic sinusitis, you may also:  · Be tested for allergies.  · Have a sample of mucus taken from your nose (nasal culture) and checked for bacteria.  · Have a mucus sample examined to see if your sinusitis is related to an allergy.  If your sinusitis does not respond to treatment and it lasts longer than 8 weeks, you may have an MRI or CT scan to check your sinuses. These scans also help to determine how severe your infection is.  In rare cases, a bone biopsy may be done to rule out more serious types of fungal sinus disease.  TREATMENT  Treatment for sinusitis depends on the cause and whether your  condition is chronic or acute. If a virus is causing your sinusitis, your symptoms will go away on their own within 10 days. You may be given medicines to relieve your symptoms, including:  · Topical nasal decongestants. They shrink swollen nasal passages and let mucus drain from your sinuses.  · Antihistamines. These drugs block inflammation that is triggered by allergies. This can help to ease swelling in your nose and sinuses.  · Topical nasal corticosteroids. These are nasal sprays that ease inflammation and swelling in your nose and sinuses.  · Nasal saline washes. These rinses can help to get rid of thick mucus in your nose.  If your condition is caused by bacteria, you will be given an antibiotic medicine. If your condition is caused by a fungus, you will be given an antifungal medicine.  Surgery may be needed to correct underlying conditions, such as narrow nasal passages. Surgery may also be needed to remove polyps.  HOME CARE INSTRUCTIONS  Medicines  · Take, use, or apply over-the-counter and prescription medicines only as told by your health care provider. These may include nasal sprays.  · If you were prescribed an antibiotic medicine, take it as told by your health care provider. Do not stop taking the antibiotic even if you start to feel better.  Hydrate and Humidify  · Drink enough water to keep your urine clear or pale yellow. Staying hydrated will help to thin your mucus.  · Use a cool mist humidifier to keep the humidity level in your home above 50%.  · Inhale steam for 10-15 minutes, 3-4 times a day or as told by your health care provider. You can do this in the bathroom while a hot shower is running.  · Limit your exposure to cool or dry air.  Rest  · Rest as much as possible.  · Sleep with your head raised (elevated).  · Make sure to get enough sleep each night.  General Instructions  · Apply a warm, moist washcloth to your face 3-4 times a day or as told by your health care provider. This will  help with discomfort.  · Wash your hands often with soap and water to reduce your exposure to viruses and other germs. If soap and water are not available, use hand .  · Do not smoke. Avoid being around people who are smoking (secondhand smoke).  · Keep all follow-up visits as told by your health care provider. This is important.  SEEK MEDICAL CARE IF:  · You have a fever.  · Your symptoms get worse.  · Your symptoms do not improve within 10 days.  SEEK IMMEDIATE MEDICAL CARE IF:  · You have a severe headache.  · You have persistent vomiting.  · You have pain or swelling around your face or eyes.  · You have vision problems.  · You develop confusion.  · Your neck is stiff.  · You have trouble breathing.     This information is not intended to replace advice given to you by your health care provider. Make sure you discuss any questions you have with your health care provider.     Document Released: 12/18/2006 Document Revised: 04/10/2017 Document Reviewed: 10/12/2016  [employer] Interactive Patient Education ©2017 [employer]  Trazodone tablets  What is this medicine?  TRAZODONE (TRAZ oh done) is used to treat depression.  This medicine may be used for other purposes; ask your health care provider or pharmacist if you have questions.  COMMON BRAND NAME(S): Kamron  What should I tell my health care provider before I take this medicine?  They need to know if you have any of these conditions:  -attempted suicide or thinking about it  -bipolar disorder  -bleeding problems  -glaucoma  -heart disease, or previous heart attack  -irregular heart beat  -kidney or liver disease  -low levels of sodium in the blood  -an unusual or allergic reaction to trazodone, other medicines, foods, dyes or preservatives  -pregnant or trying to get pregnant  -breast-feeding  How should I use this medicine?  Take this medicine by mouth with a glass of water. Follow the directions on the prescription label. Take this medicine shortly  after a meal or a light snack. Take your medicine at regular intervals. Do not take your medicine more often than directed. Do not stop taking this medicine suddenly except upon the advice of your doctor. Stopping this medicine too quickly may cause serious side effects or your condition may worsen.  A special MedGuide will be given to you by the pharmacist with each prescription and refill. Be sure to read this information carefully each time.  Talk to your pediatrician regarding the use of this medicine in children. Special care may be needed.  Overdosage: If you think you have taken too much of this medicine contact a poison control center or emergency room at once.  NOTE: This medicine is only for you. Do not share this medicine with others.  What if I miss a dose?  If you miss a dose, take it as soon as you can. If it is almost time for your next dose, take only that dose. Do not take double or extra doses.  What may interact with this medicine?  Do not take this medicine with any of the following medications:  -certain medicines for fungal infections like fluconazole, itraconazole, ketoconazole, posaconazole, voriconazole  -cisapride  -dofetilide  -dronedarone  -linezolid  -MAOIs like Carbex, Eldepryl, Marplan, Nardil, and Parnate  -mesoridazine  -methylene blue (injected into a vein)  -pimozide  -saquinavir  -thioridazine  -ziprasidone  This medicine may also interact with the following medications:  -alcohol  -antiviral medicines for HIV or AIDS  -aspirin and aspirin-like medicines  -barbiturates like phenobarbital  -certain medicines for blood pressure, heart disease, irregular heart beat  -certain medicines for depression, anxiety, or psychotic disturbances  -certain medicines for migraine headache like almotriptan, eletriptan, frovatriptan, naratriptan, rizatriptan, sumatriptan, zolmitriptan  -certain medicines for seizures like carbamazepine and phenytoin  -certain medicines for sleep  -certain medicines  that treat or prevent blood clots like dalteparin, enoxaparin, warfarin  -digoxin  -fentanyl  -lithium  -NSAIDS, medicines for pain and inflammation, like ibuprofen or naproxen  -other medicines that prolong the QT interval (cause an abnormal heart rhythm)  -rasagiline  -supplements like Aaliyah's wort, kava kava, valerian  -tramadol  -tryptophan  This list may not describe all possible interactions. Give your health care provider a list of all the medicines, herbs, non-prescription drugs, or dietary supplements you use. Also tell them if you smoke, drink alcohol, or use illegal drugs. Some items may interact with your medicine.  What should I watch for while using this medicine?  Tell your doctor if your symptoms do not get better or if they get worse. Visit your doctor or health care professional for regular checks on your progress. Because it may take several weeks to see the full effects of this medicine, it is important to continue your treatment as prescribed by your doctor.  Patients and their families should watch out for new or worsening thoughts of suicide or depression. Also watch out for sudden changes in feelings such as feeling anxious, agitated, panicky, irritable, hostile, aggressive, impulsive, severely restless, overly excited and hyperactive, or not being able to sleep. If this happens, especially at the beginning of treatment or after a change in dose, call your health care professional.  You may get drowsy or dizzy. Do not drive, use machinery, or do anything that needs mental alertness until you know how this medicine affects you. Do not stand or sit up quickly, especially if you are an older patient. This reduces the risk of dizzy or fainting spells. Alcohol may interfere with the effect of this medicine. Avoid alcoholic drinks.  This medicine may cause dry eyes and blurred vision. If you wear contact lenses you may feel some discomfort. Lubricating drops may help. See your eye doctor if the  problem does not go away or is severe.  Your mouth may get dry. Chewing sugarless gum, sucking hard candy and drinking plenty of water may help. Contact your doctor if the problem does not go away or is severe.  What side effects may I notice from receiving this medicine?  Side effects that you should report to your doctor or health care professional as soon as possible:  -allergic reactions like skin rash, itching or hives, swelling of the face, lips, or tongue  -fast, irregular heartbeat  -feeling faint or lightheaded, falls  -painful erections or other sexual dysfunction  -suicidal thoughts or other mood changes  -trembling  Side effects that usually do not require medical attention (report to your doctor or health care professional if they continue or are bothersome):  -constipation  -headache  -muscle aches or pains  -nausea, vomiting  -unusually weak or tired  This list may not describe all possible side effects. Call your doctor for medical advice about side effects. You may report side effects to FDA at 4-465-FDA-4023.  Where should I keep my medicine?  Keep out of the reach of children.  Store at room temperature between 15 and 30 degrees C (59 to 86 degrees F). Protect from light. Keep container tightly closed. Throw away any unused medicine after the expiration date.  NOTE: This sheet is a summary. It may not cover all possible information. If you have questions about this medicine, talk to your doctor, pharmacist, or health care provider.     © 2017, [employer]. (2014-07-21 15:46:28)

## 2017-08-24 NOTE — PROGRESS NOTES
Chief Complaint   Patient presents with   • URI     Cough, headache, nasal congestion, dizziness; onset 3 weeks ago        History:  Shant Arevalo is a 57 y.o. male presents who today for evaluation of the above problems.  PCP currently listed as Napoleon Regan MD.   Sinus symptoms, pain pressure, behind bilateral maxillary region and pain/pressure bilateral ears.  He has mild dizziness, mild hearing loss.  The patient has chronic tinnitis.  Does wear hearing protection sometimes.  He has used OTC sudafed, BP is stable.  He notes that this did not really provide him benefit.  Uses sudafed daily.  He is on flonase regularly.  He has used astelin before.  He has none of this now.  He has no allergies to any abx.  He has had GI issues with biaxin.  He notes augmentin fixes problem but sx return.  He wants biaxin, he feels it covers his symptoms better.  This is not first event of this.  He has 2-3 per year.  No double worsening.  Maxillary tooth discomfort is present.  Mucus worse.  The patient has been having nervous spells. These are sporadic.  Can occur at rest.  He can be watching TV and feel tremulous.  The patient notes no good reason that this occurs.  The patient feels some of this has to do with his wifes problem, thyroid issues recently.  She just had blood work a few days ago for her thyroid.   Spells last 5-10 minutes.  He does not want any medication for this.  He wants to watch and wait.  Insomnia Is present.  Limited caffeine. None after 3pm.  Has not tried benadryl, has not tried melatonin.  He can sleep but awakens in 2-3 hours and has trouble falling asleep.      Shant Arevalo  has a past medical history of Allergic; Hyperlipidemia; and Hypogonadism in male.    No Known Allergies  Past Medical History:   Diagnosis Date   • Allergic    • Hyperlipidemia    • Hypogonadism in male      Past Surgical History:   Procedure Laterality Date   • SINUS SURGERY      x5     Family History   Problem Relation  Age of Onset   • Heart disease Mother    • Cancer Mother      breast   • Breast cancer Mother    • Diabetes Mother    • No Known Problems Father    • No Known Problems Brother    • Colon cancer Neg Hx    • Prostate cancer Neg Hx    • Alcohol abuse Neg Hx    • Drug abuse Neg Hx    • Thyroid cancer Neg Hx    • Thyroid disease Neg Hx        Current Outpatient Prescriptions on File Prior to Visit   Medication Sig Dispense Refill   • cetirizine (zyrTEC) 10 MG tablet Take 10 mg by mouth Daily.     • fluticasone (FLONASE) 50 MCG/ACT nasal spray 2 sprays into each nostril Daily. 1 each 5   • Iron-Vitamins (GERITOL PO) Take  by mouth.     • Pseudoephedrine-DM-GG (PSEUDO-GUAIFEN-DEX PO) Take  by mouth Daily.     • rosuvastatin (CRESTOR) 10 MG tablet Take 10 mg by mouth Daily.     • Testosterone Cypionate 200 MG/ML kit Inject  into the shoulder, thigh, or buttocks 1 (One) Time Per Week.     • [DISCONTINUED] Triamcinolone Acetonide (NASACORT) 55 MCG/ACT nasal inhaler 2 sprays into each nostril Daily.       No current facility-administered medications on file prior to visit.        Family history, surgical history, past medical history, Allergies and meds reviewed with patient today and updated in [facility] EMR.     ROS:  Review of Systems   Constitutional: Negative for activity change, appetite change, diaphoresis, fatigue and fever.   HENT: Positive for congestion, postnasal drip, rhinorrhea, sinus pressure, sneezing, sore throat and trouble swallowing. Negative for drooling, ear discharge, ear pain, facial swelling and hearing loss.    Eyes: Negative for photophobia, discharge, redness, itching and visual disturbance.   Respiratory: Negative for cough, chest tightness, shortness of breath and wheezing.    Cardiovascular: Negative for chest pain and leg swelling.   Gastrointestinal: Negative for abdominal pain, constipation, diarrhea, nausea and vomiting.   Genitourinary: Negative for decreased urine volume, difficulty urinating,  "flank pain and hematuria.   Musculoskeletal: Negative for back pain and neck pain.   Skin: Negative for color change and rash.   Neurological: Negative for dizziness, speech difficulty, weakness, numbness and headaches.   Psychiatric/Behavioral: Negative for agitation, behavioral problems, confusion, decreased concentration and hallucinations. The patient is nervous/anxious.        OBJECTIVE:  Vitals:    08/24/17 1151   BP: 132/80   Pulse: 99   Resp: 16   Temp: 98.2 °F (36.8 °C)   SpO2: 98%   Weight: 199 lb (90.3 kg)   Height: 72\" (182.9 cm)     Physical Exam   Constitutional: He is oriented to person, place, and time. He appears well-developed and well-nourished.   HENT:   Head: Normocephalic and atraumatic.   Right Ear: External ear normal.   Left Ear: External ear normal.   Nose: Mucosal edema, rhinorrhea and sinus tenderness present. No nasal deformity or septal deviation. Right sinus exhibits maxillary sinus tenderness. Left sinus exhibits maxillary sinus tenderness.   Mouth/Throat: Uvula is midline. Posterior oropharyngeal erythema present. No oropharyngeal exudate, posterior oropharyngeal edema or tonsillar abscesses.   Eyes: Conjunctivae and EOM are normal. Pupils are equal, round, and reactive to light.   Neck: Normal range of motion. Neck supple.   Cardiovascular: Normal rate, regular rhythm, normal heart sounds and intact distal pulses.    Pulmonary/Chest: Effort normal and breath sounds normal. No accessory muscle usage. No apnea. No respiratory distress. He has no decreased breath sounds. He has no wheezes. He has no rhonchi. He has no rales.   Abdominal: Soft. Normal appearance and bowel sounds are normal. There is no tenderness.   Musculoskeletal: He exhibits no edema, tenderness or deformity.   Lymphadenopathy:     He has no cervical adenopathy.   Neurological: He is alert and oriented to person, place, and time.   Skin: Skin is warm and dry.   Psychiatric: He has a normal mood and affect. His " behavior is normal. Judgment and thought content normal.   Vitals reviewed.      Assessment/Plan:  Acute recurrent maxillary sinusitis: Sinusitis: Acute rhinosinusitis with symptoms <4 weeks.  Ddx includes viral URI to include corona, adeno, parainfluenza, rhinovirus, noninfectious rhinitis (vasomotor and allergic) and bacterial rhinosinusitis.  Discussed ddx and discussed when abx are recommended and when not and discussed treatment options with patient today. Reviewed typically no abx are recommended until day 7-10.  Offered deferred abx as best line if sx present <7 days.   Allergies to medications and abx reviewed.  The patient voiced understanding and agrees to listed therapy.  Steroid injection R/B/A d/w patient and offered to be given today.  Steroids by mouth discussed as well.  Discussed benefits of nasal steroid and to consider daily second gen antihistamine.  Discussed risks/benefits of OTC decongestants.  Caution with blood pressure.  Consider netti pot. f/u in 1-2 weeks if not improving.  a. Reviewed R/B/A Injections d/w patient.   b. Offered handout on sinus infections to patient.  c. Allergy recommendations discussed.  Would change pillowcases and wash with hot/dry hot 2x weekly and change sheets minimum weekly. Consider anti-allergenic coverings for sheets/pillowcases.  Avoid tobacco, Keep pets out of room of sleeping as able.  Use home circulation instead of windows down.  Nasal steroids discussed today.  d. Risks/benefits of abx and steroids discussed with patient today.  i. Handouts offered on medications.   ii. Take abx with food to decrease risks of diarrhea. Increased yogurt to decrease this risk further  iii. Consider probiotics.  iv. Decadron, dexamethasone, methylprednisolone, prednisone. Pt notified of potential pros/risks of steroid treatment including rapid improvement of condition; allergic reaction, psychologic reaction (depression, anxiety & insomnia), skin change at injection site  (color, dimpling), muscle weakness, changes in blood sugar, cataracts/glaucoma, AVN.  This list is not all inclusive and patient is aware they may refuse treatment.  e. Post infectious cough discussed with patient. Sx may last 21 days after URI/Sinus infection.  They can call if needed over next 1 week and we will consider proair/Ventolin and Tessalon.  For now no medications.  f. Nasal GC d/w patient. R/B/A to meds d/w patient, SE reviewed, handout offered from [facility] regarding medications listed.         Comments:  Declined PCN, requested macrolide.]  Orders:  -     dexamethasone (DECADRON) injection 10 mg; Inject 1 mL into the shoulder, thigh, or buttocks 1 (One) Time.  -     clarithromycin (BIAXIN) 500 MG tablet; Take 1 tablet by mouth 2 (Two) Times a Day.    Anxiety: monitor symptoms for now. Discussed buspar as reasonable option to use PRN.  Will provide this if he calls/requests it. R/B/A to meds d/w patient, SE reviewed, handout offered regarding medications listed.        Insomnia, unspecified type: Sleep hygiene discussed. No caffeine after 5 pm.  Warm shower before bed. Room cool and comfortable to encourage sleep. No TV/Phone/computer etc 1 hour before bed. The bed is for sleep and sex only.  Can consider melatonin 45min to 1 hour before bed per pacage insert.  Typically can use up to 9mg daily. Try this x 1 month.  If cannot fall asleep in 30 minutes get out of bed.  Read, yoga, meditate. Try to limit light.  Go back to bed when sleepy.  If sleep still not present after 30 minutes take benadryl.  Will readdress in 1month.   Comments:  Consider benadryl.  First 2 hours seleeping hard.  After this is when he has issues, staying asleep. D/w patient trazodone.    Risks/benefits of current and new medications discussed with the patient and or family today.  The patient/family are aware and accept that if there any side effects they should call or return to clinic as soon as possible.  Appropriate F/U  discussed for topics addressed today. All questions were answered to the satisfactory state of patient/family.  Should symptoms fail to improve or worsen they agree to call or return to clinic or to go to the ER. Education handouts were offered on any new Rx if requested.  Discussed the importance of following up with any needed screening tests/labs/specialist appointments and any requested follow-up recommended by me today.  Importance of maintaining follow-up discussed and patient accepts that missed appointments can delay diagnosis and potentially lead to worsening of conditions.    An After Visit Summary was printed and given to the patient at discharge.  Follow-up: No Follow-up on file.         Napoleon Regan M.D. 8/24/2017

## 2017-08-28 ENCOUNTER — TELEPHONE (OUTPATIENT)
Dept: FAMILY MEDICINE CLINIC | Facility: CLINIC | Age: 57
End: 2017-08-28

## 2017-08-28 NOTE — TELEPHONE ENCOUNTER
Patient called today on 8/28/17 . He said he seen you last Thursday and that you had told him if he needed anxiety medication to call back and you would put him on something. He is asking if you can do that for him.

## 2017-08-30 RX ORDER — BUSPIRONE HYDROCHLORIDE 7.5 MG/1
7.5 TABLET ORAL TAKE AS DIRECTED
Qty: 60 TABLET | Refills: 0 | Status: SHIPPED | OUTPATIENT
Start: 2017-08-30 | End: 2017-11-13

## 2017-09-28 ENCOUNTER — OFFICE VISIT (OUTPATIENT)
Dept: FAMILY MEDICINE CLINIC | Facility: CLINIC | Age: 57
End: 2017-09-28

## 2017-09-28 VITALS
HEART RATE: 82 BPM | HEIGHT: 72 IN | OXYGEN SATURATION: 97 % | SYSTOLIC BLOOD PRESSURE: 136 MMHG | DIASTOLIC BLOOD PRESSURE: 82 MMHG | RESPIRATION RATE: 18 BRPM | TEMPERATURE: 98.1 F | WEIGHT: 199.6 LBS | BODY MASS INDEX: 27.04 KG/M2

## 2017-09-28 DIAGNOSIS — H69.83 ETD (EUSTACHIAN TUBE DYSFUNCTION), BILATERAL: ICD-10-CM

## 2017-09-28 DIAGNOSIS — H69.83 ETD (EUSTACHIAN TUBE DYSFUNCTION), BILATERAL: Primary | ICD-10-CM

## 2017-09-28 DIAGNOSIS — J30.89 ENVIRONMENTAL AND SEASONAL ALLERGIES: ICD-10-CM

## 2017-09-28 PROBLEM — H69.80 ETD (EUSTACHIAN TUBE DYSFUNCTION): Status: ACTIVE | Noted: 2017-09-28

## 2017-09-28 PROCEDURE — 96372 THER/PROPH/DIAG INJ SC/IM: CPT | Performed by: FAMILY MEDICINE

## 2017-09-28 PROCEDURE — 99213 OFFICE O/P EST LOW 20 MIN: CPT | Performed by: FAMILY MEDICINE

## 2017-09-28 RX ORDER — DEXAMETHASONE SODIUM PHOSPHATE 10 MG/ML
8 INJECTION INTRAMUSCULAR; INTRAVENOUS ONCE
Status: DISCONTINUED | OUTPATIENT
Start: 2017-09-28 | End: 2017-09-28

## 2017-09-28 RX ORDER — DEXAMETHASONE SODIUM PHOSPHATE 10 MG/ML
8 INJECTION INTRAMUSCULAR; INTRAVENOUS ONCE
Status: COMPLETED | OUTPATIENT
Start: 2017-09-28 | End: 2017-09-28

## 2017-09-28 RX ADMIN — DEXAMETHASONE SODIUM PHOSPHATE 8 MG: 10 INJECTION INTRAMUSCULAR; INTRAVENOUS at 10:57

## 2017-09-28 NOTE — PATIENT INSTRUCTIONS
Eustachian Tube Dysfunction  The eustachian tube connects the middle ear to the back of the nose. It regulates air pressure in the middle ear by allowing air to move between the ear and nose. It also helps to drain fluid from the middle ear space. When the eustachian tube does not function properly, air pressure, fluid, or both can build up in the middle ear.  Eustachian tube dysfunction can affect one or both ears.  CAUSES  This condition happens when the eustachian tube becomes blocked or cannot open normally. This may result from:  · Ear infections.  · Colds and other upper respiratory infections.  · Allergies.  · Irritation, such as from cigarette smoke or acid from the stomach coming up into the esophagus (gastroesophageal reflux).  · Sudden changes in air pressure, such as from descending in an airplane.  · Abnormal growths in the nose or throat, such as nasal polyps, tumors, or enlarged tissue at the back of the throat (adenoids).  RISK FACTORS  This condition may be more likely to develop in people who smoke and people who are overweight. Eustachian tube dysfunction may also be more likely to develop in children, especially children who have:  · Certain birth defects of the mouth, such as cleft palate.  · Large tonsils and adenoids.  SYMPTOMS  Symptoms of this condition may include:  · A feeling of fullness in the ear.  · Ear pain.  · Clicking or popping noises in the ear.  · Ringing in the ear.  · Hearing loss.  · Loss of balance.  Symptoms may get worse when the air pressure around you changes, such as when you travel to an area of high elevation or fly on an airplane.   DIAGNOSIS  This condition may be diagnosed based on:  · Your symptoms.  · A physical exam of your ear, nose, and throat.  · Tests, such as those that measure:    The movement of your eardrum (tympanogram).    Your hearing (audiometry).  TREATMENT  Treatment depends on the cause and severity of your condition. If your symptoms are mild, you  "may be able to relieve your symptoms by moving air into (\"popping\") your ears. If you have symptoms of fluid in your ears, treatment may include:  · Decongestants.  · Antihistamines.  · Nasal sprays or ear drops that contain medicines that reduce swelling (steroids).  In some cases, you may need to have a procedure to drain the fluid in your eardrum (myringotomy). In this procedure, a small tube is placed in the eardrum to:  · Drain the fluid.  · Restore the air in the middle ear space.  HOME CARE INSTRUCTIONS  · Take over-the-counter and prescription medicines only as told by your health care provider.  · Use techniques to help pop your ears as recommended by your health care provider. These may include:    Chewing gum.    Yawning.    Frequent, forceful swallowing.    Closing your mouth, holding your nose closed, and gently blowing as if you are trying to blow air out of your nose.  · Do not do any of the following until your health care provider approves:    Travel to high altitudes.    Fly in airplanes.    Work in a pressurized cabin or room.    Scuba dive.  · Keep your ears dry. Dry your ears completely after showering or bathing.  · Do not smoke.  · Keep all follow-up visits as told by your health care provider. This is important.  SEEK MEDICAL CARE IF:  · Your symptoms do not go away after treatment.  · Your symptoms come back after treatment.  · You are unable to pop your ears.  · You have:    A fever.    Pain in your ear.    Pain in your head or neck.    Fluid draining from your ear.  · Your hearing suddenly changes.  · You become very dizzy.  · You lose your balance.     This information is not intended to replace advice given to you by your health care provider. Make sure you discuss any questions you have with your health care provider.     Document Released: 01/13/2017 Document Reviewed: 01/13/2017  [employer]vier Interactive Patient Education ©2017 [employer]    "

## 2017-09-28 NOTE — PROGRESS NOTES
Chief Complaint   Patient presents with   • Sinusitis     Patient states it feels like he has pressure in his ears, nasal drainage. Symptoms X 2weeks.         History:  Shant Arevalo is a 57 y.o. male presents who today for evaluation of the above problems.  PCP currently listed as Napoleon Regan MD.   Pressure bilateral ears, sinus drainage, sinus pressure and pain over past 2 weeks.  Allergy symptoms starting to worsen again. Chronic seasonal issues.  He has recurrent sinus infections.  He is using 12 hour sudafed and robitussin.  If he takes it he feels nervous.  If he uses q 4 hours sudafed it only lasts for a little while and he still has the nervous feeling.  Using flonase once daily, recommended to increase to BID.  He is using asteline BID.  Continue these for now.  He was seen 8/24/17.  We discussed his symptoms and he did have improvement.  Biaxin did provide benefit. He does not like augmentin and does not want this agent.  He feels symptoms in the forehead/cheeks are better but now having increased pressure again.  Sinus surgery 4-5x historically. Sinus symtoms are steadily worsening but not near as bad as he felt in 8/24/17.  Now mostly pressure in his ears.  Autoinsufflation led to popping in the right ear.  Mild pain at the time, pressure relieved mildly.  Hearing is a little better until he swallows and then this pops again.  Sore throat starting again this am. No maxillary tooth discomfort.  Pressure in maxilla worsening again.  No pain, just pressure.  Mild dizziness, mild hearing loss. The patient has chronic tinnitis.  Does wear hearing protection intermittently.  BP is stable.  Weight is stable.     Shant Arevalo  has a past medical history of Allergic; Anxiety (8/24/2017); Hyperlipidemia; and Hypogonadism in male.    No Known Allergies  Past Medical History:   Diagnosis Date   • Allergic    • Anxiety 8/24/2017   • Hyperlipidemia    • Hypogonadism in male      Past Surgical History:    Procedure Laterality Date   • SINUS SURGERY      x5     Family History   Problem Relation Age of Onset   • Heart disease Mother    • Cancer Mother      breast   • Breast cancer Mother    • Diabetes Mother    • No Known Problems Father    • No Known Problems Brother    • Colon cancer Neg Hx    • Prostate cancer Neg Hx    • Alcohol abuse Neg Hx    • Drug abuse Neg Hx    • Thyroid cancer Neg Hx    • Thyroid disease Neg Hx        Current Outpatient Prescriptions on File Prior to Visit   Medication Sig Dispense Refill   • busPIRone (BUSPAR) 7.5 MG tablet Take 1 tablet by mouth Take As Directed. Daily to twice daily as needed 60 tablet 0   • cetirizine (zyrTEC) 10 MG tablet Take 10 mg by mouth Daily.     • fluticasone (FLONASE) 50 MCG/ACT nasal spray 2 sprays into each nostril Daily. 1 each 5   • Iron-Vitamins (GERITOL PO) Take  by mouth.     • Pseudoephedrine-DM-GG (PSEUDO-GUAIFEN-DEX PO) Take  by mouth Daily.     • rosuvastatin (CRESTOR) 10 MG tablet Take 10 mg by mouth Daily.     • Testosterone Cypionate 200 MG/ML kit Inject  into the shoulder, thigh, or buttocks 1 (One) Time Per Week.     • [DISCONTINUED] clarithromycin (BIAXIN) 500 MG tablet Take 1 tablet by mouth 2 (Two) Times a Day. 14 tablet 0     No current facility-administered medications on file prior to visit.        Family history, surgical history, past medical history, Allergies and meds reviewed with patient today and updated in [facility] EMR.     ROS:  Review of Systems   Constitutional: Negative for activity change, appetite change, diaphoresis, fatigue and fever.   HENT: Positive for congestion, ear pain, hearing loss (chronic), postnasal drip, rhinorrhea, sinus pressure, sneezing and sore throat (post nsal drainage). Negative for drooling, ear discharge, facial swelling and trouble swallowing.    Eyes: Negative for photophobia, discharge, redness, itching and visual disturbance.   Respiratory: Negative for cough, chest tightness, shortness of breath and  "wheezing.    Cardiovascular: Negative for chest pain and leg swelling.   Gastrointestinal: Negative for abdominal pain, constipation, diarrhea, nausea and vomiting.   Genitourinary: Negative for decreased urine volume, difficulty urinating, flank pain and hematuria.   Musculoskeletal: Negative for back pain and neck pain.   Skin: Negative for color change and rash.   Neurological: Negative for dizziness, speech difficulty, weakness, numbness and headaches.   Psychiatric/Behavioral: Negative for agitation, behavioral problems, confusion, decreased concentration and hallucinations. The patient is not nervous/anxious.        OBJECTIVE:  Vitals:    09/28/17 0948   BP: 136/82   Pulse: 82   Resp: 18   Temp: 98.1 °F (36.7 °C)   SpO2: 97%   Weight: 199 lb 9.6 oz (90.5 kg)   Height: 72\" (182.9 cm)     Physical Exam   Constitutional: He is oriented to person, place, and time. He appears well-developed and well-nourished.  Non-toxic appearance. He appears ill. No distress.   HENT:   Head: Normocephalic and atraumatic.   Right Ear: Hearing, external ear and ear canal normal. Tympanic membrane is bulging. Tympanic membrane is not injected, not scarred, not perforated and not erythematous. Tympanic membrane mobility is abnormal. A middle ear effusion is present.   Left Ear: Hearing, external ear and ear canal normal. Tympanic membrane is bulging. Tympanic membrane is not injected, not scarred, not perforated and not erythematous. Tympanic membrane mobility is abnormal. A middle ear effusion is present.   Nose: Mucosal edema and rhinorrhea present. No nasal deformity or septal deviation. Right sinus exhibits no maxillary sinus tenderness and no frontal sinus tenderness. Left sinus exhibits no maxillary sinus tenderness and no frontal sinus tenderness.   Mouth/Throat: Uvula is midline. No oral lesions. Posterior oropharyngeal erythema present. No oropharyngeal exudate, posterior oropharyngeal edema or tonsillar abscesses. No " tonsillar exudate.   Boggy turbinates, erythematous.   Eyes: Conjunctivae and EOM are normal. Pupils are equal, round, and reactive to light.   Neck: Normal range of motion. Neck supple.   Cardiovascular: Normal rate, regular rhythm, normal heart sounds and intact distal pulses.    Pulmonary/Chest: Effort normal. No accessory muscle usage. No apnea. No respiratory distress. He has decreased breath sounds. He has no wheezes. He has no rhonchi. He has no rales.   Abdominal: Soft. Normal appearance and bowel sounds are normal. There is no tenderness.   Musculoskeletal: Normal range of motion. He exhibits no edema, tenderness or deformity.   Lymphadenopathy:     He has no cervical adenopathy.     He has no axillary adenopathy.   Neurological: He is alert and oriented to person, place, and time. No cranial nerve deficit.   Skin: Skin is warm and dry. No rash noted.   Psychiatric: He has a normal mood and affect. His speech is normal and behavior is normal. Judgment and thought content normal. He is not actively hallucinating. Cognition and memory are normal. He is attentive.   Nursing note and vitals reviewed.      Tympanometry.  Normal A type bilaterally. Mildly flatter on the left than right.    Assessment/Plan:  No sinusitis at this time. No e/o infection at this time. Worsening allergies, worsening pressure in sinuses and within ears.  ETD is most likely etiology.  Limited options for tx.  Antihistamines can sometimes worsen this. Literature supports that steroids, nasal GC/nasal antihistamines of ?benefit.  However he feel sbetter with sudafed and the above tx.  We talked about R/B/A to GC.  He feels better on them but has some anxiety/sE.  We talked about dose and we will drop dose to Decadron 8.   Injections as listed below  1. Decadron, dexamethasone, methylprednisolone-Pt notified of potential pros/risks of steroid treatment including rapid improvement of condition; allergic reaction, psychologic reaction  (depression, anxiety & insomnia), skin change at injection site (color, dimpling), muscle weakness.  Pt is aware they may refuse treatment.  2. Tympanometry  3. Handout provided from [employer].  4. If worsening sinus pain/pressure, if worsening ear pain/pressure can f/u by phone in 1 week for another ound of biaxin 500 BID x     Environmental allergies:  Continue flonase/asteline.  Increase flonase to BID.  BP is stable continue sudafed PRN.  F/U as neededed.    Orders Placed Today:  Shant was seen today for sinusitis.    Diagnoses and all orders for this visit:    ETD (eustachian tube dysfunction), bilateral  -     Discontinue: dexamethasone (DECADRON) injection 8 mg; Infuse 0.8 mL into a venous catheter 1 (One) Time.  -     dexamethasone (DECADRON) injection 8 mg; Inject 0.8 mL into the shoulder, thigh, or buttocks 1 (One) Time.  -     Tympanometry; Future    Environmental and seasonal allergies    BMI 27.0-27.9,adult  Comments:  Work on weight loss, portion control, regular activity.        Risks/benefits of current and new medications discussed with the patient and or family today.  The patient/family are aware and accept that if there any side effects they should call or return to clinic as soon as possible.  Appropriate F/U discussed for topics addressed today. All questions were answered to the satisfactory state of patient/family.  Should symptoms fail to improve or worsen they agree to call or return to clinic or to go to the ER. Education handouts were offered on any new Rx if requested.  Discussed the importance of following up with any needed screening tests/labs/specialist appointments and any requested follow-up recommended by me today.  Importance of maintaining follow-up discussed and patient accepts that missed appointments can delay diagnosis and potentially lead to worsening of conditions.    An After Visit Summary was printed and given to the patient at discharge.  Follow-up: Return if symptoms worsen  or fail to improve.         Napoleon Regan M.D. 9/28/2017

## 2017-11-06 DIAGNOSIS — N40.1 BPH WITH URINARY OBSTRUCTION: Primary | ICD-10-CM

## 2017-11-06 DIAGNOSIS — N13.8 BPH WITH URINARY OBSTRUCTION: Primary | ICD-10-CM

## 2017-11-09 LAB — PSA SERPL-MCNC: 1.77 NG/ML (ref 0–4)

## 2017-11-13 ENCOUNTER — OFFICE VISIT (OUTPATIENT)
Dept: UROLOGY | Facility: CLINIC | Age: 57
End: 2017-11-13

## 2017-11-13 VITALS
SYSTOLIC BLOOD PRESSURE: 132 MMHG | TEMPERATURE: 98.3 F | HEIGHT: 72 IN | DIASTOLIC BLOOD PRESSURE: 78 MMHG | BODY MASS INDEX: 27.66 KG/M2 | WEIGHT: 204.2 LBS

## 2017-11-13 DIAGNOSIS — R35.0 URINARY FREQUENCY: ICD-10-CM

## 2017-11-13 DIAGNOSIS — N13.8 BPH WITH URINARY OBSTRUCTION: Primary | ICD-10-CM

## 2017-11-13 DIAGNOSIS — N40.1 BPH WITH URINARY OBSTRUCTION: Primary | ICD-10-CM

## 2017-11-13 LAB
BILIRUB BLD-MCNC: NEGATIVE MG/DL
CLARITY, POC: CLEAR
COLOR UR: YELLOW
GLUCOSE UR STRIP-MCNC: NEGATIVE MG/DL
KETONES UR QL: NEGATIVE
LEUKOCYTE EST, POC: NEGATIVE
NITRITE UR-MCNC: NEGATIVE MG/ML
PH UR: 7.5 [PH] (ref 5–8)
PROT UR STRIP-MCNC: NEGATIVE MG/DL
RBC # UR STRIP: NEGATIVE /UL
SP GR UR: 1.02 (ref 1–1.03)
UROBILINOGEN UR QL: NORMAL

## 2017-11-13 PROCEDURE — 51798 US URINE CAPACITY MEASURE: CPT | Performed by: UROLOGY

## 2017-11-13 PROCEDURE — 81003 URINALYSIS AUTO W/O SCOPE: CPT | Performed by: UROLOGY

## 2017-11-13 PROCEDURE — 99214 OFFICE O/P EST MOD 30 MIN: CPT | Performed by: UROLOGY

## 2017-11-13 RX ORDER — TAMSULOSIN HYDROCHLORIDE 0.4 MG/1
1 CAPSULE ORAL NIGHTLY
COMMUNITY
End: 2017-11-13

## 2017-11-13 NOTE — PROGRESS NOTES
"  Mr. Arevalo is 57 y.o. male    CHIEF COMPLAINT: I am here today for \"urinating all the time\".  PSA-1.770    HPI  He is here for follow-up of lower urinary tract symptoms.  This started little over a year ago.  He has some urgency with a perceived volume of urine that is small.  He also has urinary frequency but denies poor stream or dysuria.  At the last visit he was started on tamsulosin which  Helped \"some\" for one month but he chose not to refill it. He thinks it worse as he increases his decaffeinated tea.     The following portions of the patient's history were reviewed and updated as appropriate: allergies, current medications, past family history, past medical history, past social history, past surgical history and problem list.    Review of Systems   Constitutional: Negative for chills and fever.   Gastrointestinal: Negative for abdominal pain, anal bleeding and blood in stool.   Genitourinary: Positive for frequency. Negative for difficulty urinating, flank pain, hematuria and urgency.         Current Outpatient Prescriptions:   •  cetirizine (zyrTEC) 10 MG tablet, Take 10 mg by mouth Daily., Disp: , Rfl:   •  fluticasone (FLONASE) 50 MCG/ACT nasal spray, 2 sprays into each nostril Daily., Disp: 1 each, Rfl: 5  •  Iron-Vitamins (GERITOL PO), Take  by mouth., Disp: , Rfl:   •  Mirabegron ER (MYRBETRIQ) 25 MG tablet sustained-release 24 hour 24 hr tablet, Take 1 tablet by mouth Daily for 30 days., Disp: 30 tablet, Rfl: 11  •  Pseudoephedrine-DM-GG (PSEUDO-GUAIFEN-DEX PO), Take  by mouth Daily., Disp: , Rfl:   •  rosuvastatin (CRESTOR) 10 MG tablet, Take 10 mg by mouth Daily., Disp: , Rfl:   •  Testosterone Cypionate 200 MG/ML kit, Inject  into the shoulder, thigh, or buttocks 1 (One) Time Per Week., Disp: , Rfl:     Past Medical History:   Diagnosis Date   • Allergic    • Anxiety 8/24/2017   • Hyperlipidemia    • Hypogonadism in male        Past Surgical History:   Procedure Laterality Date   • SINUS SURGERY  " "    x5       Social History     Social History   • Marital status:      Spouse name: N/A   • Number of children: N/A   • Years of education: N/A     Social History Main Topics   • Smoking status: Never Smoker   • Smokeless tobacco: Never Used   • Alcohol use No   • Drug use: No   • Sexual activity: Yes     Partners: Female     Other Topics Concern   • None     Social History Narrative    Retired [place] [employer]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        Family History   Problem Relation Age of Onset   • Heart disease Mother    • Cancer Mother      breast   • Breast cancer Mother    • Diabetes Mother    • No Known Problems Father    • No Known Problems Brother    • Colon cancer Neg Hx    • Prostate cancer Neg Hx    • Alcohol abuse Neg Hx    • Drug abuse Neg Hx    • Thyroid cancer Neg Hx    • Thyroid disease Neg Hx        /78  Temp 98.3 °F (36.8 °C)  Ht 72\" (182.9 cm)  Wt 204 lb 3.2 oz (92.6 kg)  BMI 27.69 kg/m2    Physical Exam  Alert and oriented ×3  Not agitated or distressed  No obvious deformities  No respiratory distress  Skin without pallor or diaphoresis  NICHOLAS: Benign feeling prostate without nodule approximately 30 mL in size.   Penis and testicles are normal       Results for orders placed or performed in visit on 11/13/17   POC Urinalysis Dipstick, Automated   Result Value Ref Range    Color Yellow Yellow, Straw, Dark Yellow, Areli    Clarity, UA Clear Clear    Glucose, UA Negative Negative, 1000 mg/dL (3+) mg/dL    Bilirubin Negative Negative    Ketones, UA Negative Negative    Specific Gravity  1.025 1.005 - 1.030    Blood, UA Negative Negative    pH, Urine 7.5 5.0 - 8.0    Protein, POC Negative Negative mg/dL    Urobilinogen, UA Normal Normal    Leukocytes Negative Negative    Nitrite, UA Negative Negative       Imaging Results (last 7 days)     ** No results found for the last 168 hours. **          Assessment and Plan  Shant was seen today for benign prostatic " hypertrophy.    Diagnoses and all orders for this visit:    BPH with urinary obstruction  -     POC Urinalysis Dipstick, Automated    Urinary frequency  -     Mirabegron ER (MYRBETRIQ) 25 MG tablet sustained-release 24 hour 24 hr tablet; Take 1 tablet by mouth Daily for 30 days.      His symptoms have worsened. Exam consistent with BPH but I do think he has some overactive bladder as well. He was not pleased enough to have it refilled. We will try an OAB medication, Myrbetriq.       Tony Begum MD  11/13/17  12:54 PM      Cc:Dr. Regan        Estimation of residual urine via abdominal ultrasound  Residual Urine: 11 ml  Indication: bph  Position: Supine  Examination: Incremental scanning of the suprapubic area using 3 MHz transducer using copious amounts of acoustic gel.   Findings: An anechoic area was demonstrated which represented the bladder, with measurement of residual urine as noted. I inspected this myself. In that the residual urine was stable or insignificant, no treatment will be necessary at this time.

## 2017-12-04 ENCOUNTER — OFFICE VISIT (OUTPATIENT)
Dept: FAMILY MEDICINE CLINIC | Facility: CLINIC | Age: 57
End: 2017-12-04

## 2017-12-04 VITALS
DIASTOLIC BLOOD PRESSURE: 76 MMHG | HEIGHT: 72 IN | WEIGHT: 202.8 LBS | RESPIRATION RATE: 18 BRPM | OXYGEN SATURATION: 97 % | BODY MASS INDEX: 27.47 KG/M2 | HEART RATE: 78 BPM | SYSTOLIC BLOOD PRESSURE: 128 MMHG | TEMPERATURE: 97.8 F

## 2017-12-04 DIAGNOSIS — Z11.59 NEED FOR HEPATITIS C SCREENING TEST: ICD-10-CM

## 2017-12-04 DIAGNOSIS — Z13.220 SCREENING FOR LIPID DISORDERS: ICD-10-CM

## 2017-12-04 DIAGNOSIS — J01.41 ACUTE RECURRENT PANSINUSITIS: Primary | ICD-10-CM

## 2017-12-04 DIAGNOSIS — E29.1 HYPOGONADISM IN MALE: ICD-10-CM

## 2017-12-04 PROCEDURE — 96372 THER/PROPH/DIAG INJ SC/IM: CPT | Performed by: FAMILY MEDICINE

## 2017-12-04 PROCEDURE — 99214 OFFICE O/P EST MOD 30 MIN: CPT | Performed by: FAMILY MEDICINE

## 2017-12-04 RX ORDER — FLUTICASONE PROPIONATE 50 MCG
1 SPRAY, SUSPENSION (ML) NASAL 2 TIMES DAILY
Qty: 16 G | Refills: 3 | Status: SHIPPED | OUTPATIENT
Start: 2017-12-04

## 2017-12-04 RX ORDER — AZELASTINE 1 MG/ML
2 SPRAY, METERED NASAL 2 TIMES DAILY
Qty: 30 ML | Refills: 3 | Status: SHIPPED | OUTPATIENT
Start: 2017-12-04 | End: 2022-01-17 | Stop reason: SDUPTHER

## 2017-12-04 RX ORDER — DEXAMETHASONE SODIUM PHOSPHATE 10 MG/ML
8 INJECTION INTRAMUSCULAR; INTRAVENOUS ONCE
Status: COMPLETED | OUTPATIENT
Start: 2017-12-04 | End: 2017-12-04

## 2017-12-04 RX ORDER — CLARITHROMYCIN 500 MG/1
500 TABLET, COATED ORAL 2 TIMES DAILY
Qty: 14 TABLET | Refills: 0 | Status: SHIPPED | OUTPATIENT
Start: 2017-12-04 | End: 2021-04-05

## 2017-12-04 RX ADMIN — DEXAMETHASONE SODIUM PHOSPHATE 8 MG: 10 INJECTION INTRAMUSCULAR; INTRAVENOUS at 14:59

## 2017-12-04 NOTE — PROGRESS NOTES
Chief Complaint   Patient presents with   • Sinusitis     Patient is here today for head and sinius congestion. Symptoms started 3 weeks ago.         History:  Shant Arevalo is a 57 y.o. male presents who today for evaluation of the above problems.  PCP currently listed as Napoleon Regan MD.   Present with wife Mary Jane.  Permission to speak freely discussed and patient agreed.  The patient has been having sinus pain /pressure, eye pain bilaterally.  He has been using flonase and astelin every am.  I discussed that this can be used 1 spray BID.  The spray has been helping.  Bilateral otalgia, bilateral pain behind eyes.  No diurnal pattern. Thicker mucus, darker mucus.  He is coughing.  HA frontal/maxillary 4/10 throbbing aching no radiation.  Increased sputum.  Not using tobacco products. No allergies to foods/medications.  No recent travel.  No sick contacts.  He is doing well otherwise.  No fever.  Gets a shot once weekly.  He takes shot every Friday.  Due for T replacement.  He has been on 0.7ml.  The patient will continue this.  Feeling okay on this> PSA normal 11/2017.  He has tried taking more than that but gets angry, cranky, skin changs.  NO recent CBC/testosterone levels. Will check these today.     Solo [id number] last fill testosterone Dr. Bond 6/21/17 [facility].  No red flags. The patient has read and signed the [facility] Controlled Substance Contract.  I will continue to see patient for regular follow up appointments.  They are well controlled on their medication.  SOLO is updated every 3 months. The patient is aware of the potential for addiction and dependence.  We discussed the need to check annual PSA, regular CBC to evaluate for erythrocytosis elevated hemoglobin and hematocrit.  We also discussed symptoms and checking testosterone regularly to try to keep testosterone levels less than 700.  Patient is aware of the risks and benefits of testosterone.  Libido is stable.  He is  currently taking this roughly 0.7 0.75 mL weekly.  Discussed typical dose of 400 mg per month and titrating to testosterone levels of 3 5700.  He feels better on the medication, he is not abusing.  He is using as recommended.  Fatigue is stable energy levels are better. BP is stable, weight is stable. No worsening/changs in sleep.       Shant Arevalo  has a past medical history of Allergic; Anxiety (8/24/2017); Hyperlipidemia; and Hypogonadism in male.    No Known Allergies  Past Medical History:   Diagnosis Date   • Allergic    • Anxiety 8/24/2017   • Hyperlipidemia    • Hypogonadism in male      Past Surgical History:   Procedure Laterality Date   • SINUS SURGERY      x5     Family History   Problem Relation Age of Onset   • Heart disease Mother    • Cancer Mother      breast   • Breast cancer Mother    • Diabetes Mother    • No Known Problems Father    • No Known Problems Brother    • Colon cancer Neg Hx    • Prostate cancer Neg Hx    • Alcohol abuse Neg Hx    • Drug abuse Neg Hx    • Thyroid cancer Neg Hx    • Thyroid disease Neg Hx        Current Outpatient Prescriptions on File Prior to Visit   Medication Sig Dispense Refill   • cetirizine (zyrTEC) 10 MG tablet Take 10 mg by mouth Daily.     • fluticasone (FLONASE) 50 MCG/ACT nasal spray 2 sprays into each nostril Daily. 1 each 5   • Iron-Vitamins (GERITOL PO) Take  by mouth.     • Mirabegron ER (MYRBETRIQ) 25 MG tablet sustained-release 24 hour 24 hr tablet Take 1 tablet by mouth Daily for 30 days. 30 tablet 11   • Pseudoephedrine-DM-GG (PSEUDO-GUAIFEN-DEX PO) Take  by mouth Daily.     • rosuvastatin (CRESTOR) 10 MG tablet Take 10 mg by mouth Daily.     • Testosterone Cypionate 200 MG/ML kit Inject  into the shoulder, thigh, or buttocks 1 (One) Time Per Week.       No current facility-administered medications on file prior to visit.        Family history, surgical history, past medical history, Allergies and meds reviewed with patient today and updated in EPIC  "EMR.     ROS:  Review of Systems   Constitutional: Negative for activity change, appetite change, diaphoresis, fatigue and fever.   HENT: Positive for congestion, postnasal drip, rhinorrhea, sinus pressure, sneezing, sore throat and trouble swallowing.    Eyes: Negative for photophobia, discharge, redness, itching and visual disturbance.   Respiratory: Negative for cough, chest tightness, shortness of breath and wheezing.    Cardiovascular: Negative for chest pain and leg swelling.   Gastrointestinal: Negative for abdominal pain, constipation, diarrhea, nausea and vomiting.   Genitourinary: Negative for decreased urine volume, difficulty urinating, flank pain and hematuria.        Hypogonadism treated with testosterone.    Musculoskeletal: Negative for back pain and neck pain.   Skin: Negative for color change and rash.   Neurological: Negative for dizziness, speech difficulty, weakness, numbness and headaches.   Psychiatric/Behavioral: Negative for agitation, behavioral problems, confusion, decreased concentration and hallucinations. The patient is not nervous/anxious.        OBJECTIVE:  Vitals:    12/04/17 1413   BP: 128/76   Pulse: 78   Resp: 18   Temp: 97.8 °F (36.6 °C)   SpO2: 97%   Weight: 202 lb 12.8 oz (92 kg)   Height: 72\" (182.9 cm)     Physical Exam   Constitutional: He is oriented to person, place, and time. He appears well-developed and well-nourished.   HENT:   Head: Normocephalic and atraumatic.   Right Ear: External ear normal.   Left Ear: External ear normal.   Nose: Mucosal edema, rhinorrhea and sinus tenderness present. No nasal deformity or septal deviation. Right sinus exhibits maxillary sinus tenderness. Left sinus exhibits maxillary sinus tenderness.   Mouth/Throat: Uvula is midline. Posterior oropharyngeal erythema present. No oropharyngeal exudate, posterior oropharyngeal edema or tonsillar abscesses.   Eyes: Conjunctivae and EOM are normal. Pupils are equal, round, and reactive to light. "   Neck: Normal range of motion. Neck supple.   Cardiovascular: Normal rate, regular rhythm, normal heart sounds and intact distal pulses.    Pulmonary/Chest: Effort normal and breath sounds normal. No accessory muscle usage. No apnea. No respiratory distress. He has no decreased breath sounds. He has no wheezes. He has no rhonchi. He has no rales.   Abdominal: Soft. Normal appearance and bowel sounds are normal. There is no tenderness.   Genitourinary:   Genitourinary Comments: Declined by patient today.   Musculoskeletal: He exhibits no edema, tenderness or deformity.    5 out of 5 bilaterally.  Strength 5 out of 5 bilaterally upper and lower extremities.   Lymphadenopathy:     He has no cervical adenopathy.     He has no axillary adenopathy.        Right: No inguinal adenopathy present.        Left: No inguinal adenopathy present.   Neurological: He is alert and oriented to person, place, and time.   Skin: Skin is warm and dry.   Psychiatric: He has a normal mood and affect. His behavior is normal. Judgment and thought content normal.   Nursing note and vitals reviewed.      Assessment/Plan:  Acute recurrent pansinusitis: Sinusitis: Acute rhinosinusitis with symptoms <4 weeks.  Ddx includes viral URI to include corona, adeno, parainfluenza, rhinovirus, noninfectious rhinitis (vasomotor and allergic) and bacterial rhinosinusitis.  Discussed ddx and discussed when abx are recommended and when not and discussed treatment options with patient today. Reviewed typically no abx are recommended until day 7-10.  Offered deferred abx as best line if sx present <7 days.   Allergies to medications and abx reviewed.  The patient voiced understanding and agrees to listed therapy.  Steroid injection R/B/A d/w patient and offered to be given today.  Steroids by mouth discussed as well.  Discussed benefits of nasal steroid and to consider daily second gen antihistamine.  Discussed risks/benefits of OTC decongestants.  Caution  with blood pressure.  Consider netti pot. f/u in 1-2 weeks if not improving.  a. Reviewed R/B/A Injections d/w patient.   b. Offered handout on sinus infections to patient.  c. Allergy recommendations discussed.  Would change pillowcases and wash with hot/dry hot 2x weekly and change sheets minimum weekly. Consider anti-allergenic coverings for sheets/pillowcases.  Avoid tobacco, Keep pets out of room of sleeping as able.  Use home circulation instead of windows down.  Nasal steroids discussed today.  d. Risks/benefits of abx and steroids discussed with patient today.  i. Handouts offered on medications.   ii. Take abx with food to decrease risks of diarrhea. Increased yogurt to decrease this risk further  iii. Consider probiotics.  iv. If taking antibiotics and using birth control at the same time it is important to use a backup method of birth control for 2-4 weeks as antibiotics can decrease efficacy of the birth control.   v. Decadron, dexamethasone, methylprednisolone, prednisone. Pt notified of potential pros/risks of steroid treatment including rapid improvement of condition; allergic reaction, psychologic reaction (depression, anxiety & insomnia), skin change at injection site (color, dimpling), muscle weakness, changes in blood sugar, cataracts/glaucoma, AVN.  This list is not all inclusive and patient is aware they may refuse treatment.  e. Post infectious cough discussed with patient. Sx may last 21 days after URI/Sinus infection.  They can call if needed over next 1 week and we will consider proair/Ventolin and Tessalon.  For now no medications.  f. Nasal GC d/w patient. R/B/A to meds d/w patient, SE reviewed, handout offered from update regarding medications listed.     g.   Recommended PCN, patietn declined this and requested biaxon. Reviewed literature with him. Dx is appropriate but he blackmon snot want PCN he wants macrolide.  We discussed that macrolides are not recommended for sinus infection.   Patient decided he wanted that works better for him.  Again we discussed risks benefits alternatives to this medicine  ORDERS  -     dexamethasone (DECADRON) injection 8 mg; Inject 0.8 mL into the shoulder, thigh, or buttocks 1 (One) Time.  -     fluticasone (FLONASE) 50 MCG/ACT nasal spray; 1 spray into each nostril 2 (Two) Times a Day.  -     clarithromycin (BIAXIN) 500 MG tablet; Take 1 tablet by mouth 2 (Two) Times a Day.  -     azelastine (ASTELIN) 0.1 % nasal spray; 2 sprays into each nostril 2 (Two) Times a Day. Use in each nostril as directed    Hypogonadism in male: We discussed  the potential risks to the medication to include prostatic volume increase/changes and it is recommended to evaluate NICHOLAS and PSA before starting therapy and if >54 yo to estimate risks of prostate cancer. Men with BPH should consider evaluation by the IPSS and to consider PVR. Repeat NICHOLAS and PSA 3-6 months after starting therapy then annually. Consider referral to urology if nodules palpated or if PSA >1.4ng/ml in 12 months or >0.4ng/ml increase annually x 2.    T replacement can worsen Sleep apnea. T replacement can cause erythrocytosis, which is very common. This does increase risks for VTE. It is recommended to measure HCT at initiation and if elevated, the cause needs to be evaluated before starting Testosterone. Repeat measurement at 3-6 months then annually.  If elevated >54% T should be stopped. Repeat 8 weeks after decreased/stopped therapy. Can consider phlebotomy to treat erythrocytosis if HCT cannot be kept in normal range.  There are significant risks of DVT/MI/Stroke and death with this rx, which is reportedly unrelated to the erythrocytosis.  Risks of MI and stroke are higher in injectable versions of testosterone, but not VTE. Androderm can cause skin rash, never apply to broken skin.  Discussed the appropriate use, to wash hands thoroughly after application, avoid contact with skin until gel has dried, try to avoid  getting site wet for 5 hours after application. Lastly exogenous testosterone suppresses natural LH and potentially spermatogenesis.  The patient is aware of the above and agrees to utilize testosterone.  Goal T levels ~350-700.   -     CBC & Differential  -     Comprehensive Metabolic Panel  -     Testosterone  -     Testosterone Cypionate 200 MG/ML kit; Inject 0.75 mL into the shoulder, thigh, or buttocks 1 (One) Time Per Week.  -     Nursing communication    Screening for lipid disorders  -     Lipid Panel    Need for hepatitis C screening test  -     Hepatitis C Antibody      Risks/benefits of current and new medications discussed with the patient and or family today.  The patient/family are aware and accept that if there any side effects they should call or return to clinic as soon as possible.  Appropriate F/U discussed for topics addressed today. All questions were answered to the satisfactory state of patient/family.  Should symptoms fail to improve or worsen they agree to call or return to clinic or to go to the ER. Education handouts were offered on any new Rx if requested.  Discussed the importance of following up with any needed screening tests/labs/specialist appointments and any requested follow-up recommended by me today.  Importance of maintaining follow-up discussed and patient accepts that missed appointments can delay diagnosis and potentially lead to worsening of conditions.    An After Visit Summary was printed and given to the patient at discharge.  Follow-up: Return in about 4 months (around 4/4/2018).         Napoleon Regan M.D. 12/4/2017

## 2017-12-05 LAB
ALBUMIN SERPL-MCNC: 4.5 G/DL (ref 3.5–5)
ALBUMIN/GLOB SERPL: 1.7 G/DL (ref 1.1–2.5)
ALP SERPL-CCNC: 71 U/L (ref 24–120)
ALT SERPL-CCNC: 62 U/L (ref 0–54)
AST SERPL-CCNC: 38 U/L (ref 7–45)
BASOPHILS # BLD AUTO: 0.05 10*3/MM3 (ref 0–0.2)
BASOPHILS NFR BLD AUTO: 0.6 % (ref 0–2)
BILIRUB SERPL-MCNC: 0.5 MG/DL (ref 0.1–1)
BUN SERPL-MCNC: 9 MG/DL (ref 5–21)
BUN/CREAT SERPL: 6.9 (ref 7–25)
CALCIUM SERPL-MCNC: 9.6 MG/DL (ref 8.4–10.4)
CHLORIDE SERPL-SCNC: 102 MMOL/L (ref 98–110)
CHOLEST SERPL-MCNC: 142 MG/DL (ref 130–200)
CO2 SERPL-SCNC: 33 MMOL/L (ref 24–31)
CREAT SERPL-MCNC: 1.3 MG/DL (ref 0.5–1.4)
EOSINOPHIL # BLD AUTO: 0.05 10*3/MM3 (ref 0–0.7)
EOSINOPHIL NFR BLD AUTO: 0.6 % (ref 0–4)
ERYTHROCYTE [DISTWIDTH] IN BLOOD BY AUTOMATED COUNT: 12.4 % (ref 12–15)
GFR SERPLBLD CREATININE-BSD FMLA CKD-EPI: 57 ML/MIN/1.73
GFR SERPLBLD CREATININE-BSD FMLA CKD-EPI: 69 ML/MIN/1.73
GLOBULIN SER CALC-MCNC: 2.7 GM/DL
GLUCOSE SERPL-MCNC: 85 MG/DL (ref 70–100)
HCT VFR BLD AUTO: 49.6 % (ref 40–52)
HCV AB S/CO SERPL IA: 0.1 S/CO RATIO (ref 0–0.9)
HDLC SERPL-MCNC: 33 MG/DL
HGB BLD-MCNC: 16.1 G/DL (ref 14–18)
IMM GRANULOCYTES # BLD: 0.03 10*3/MM3 (ref 0–0.03)
IMM GRANULOCYTES NFR BLD: 0.4 % (ref 0–5)
LDLC SERPL CALC-MCNC: 46 MG/DL (ref 0–99)
LYMPHOCYTES # BLD AUTO: 1.93 10*3/MM3 (ref 0.72–4.86)
LYMPHOCYTES NFR BLD AUTO: 23.8 % (ref 15–45)
MCH RBC QN AUTO: 30.4 PG (ref 28–32)
MCHC RBC AUTO-ENTMCNC: 32.5 G/DL (ref 33–36)
MCV RBC AUTO: 93.6 FL (ref 82–95)
MONOCYTES # BLD AUTO: 0.6 10*3/MM3 (ref 0.19–1.3)
MONOCYTES NFR BLD AUTO: 7.4 % (ref 4–12)
NEUTROPHILS # BLD AUTO: 5.45 10*3/MM3 (ref 1.87–8.4)
NEUTROPHILS NFR BLD AUTO: 67.2 % (ref 39–78)
NRBC BLD AUTO-RTO: 0 /100 WBC (ref 0–0)
PLATELET # BLD AUTO: 247 10*3/MM3 (ref 130–400)
POTASSIUM SERPL-SCNC: 4.6 MMOL/L (ref 3.5–5.3)
PROT SERPL-MCNC: 7.2 G/DL (ref 6.3–8.7)
RBC # BLD AUTO: 5.3 10*6/MM3 (ref 4.8–5.9)
SODIUM SERPL-SCNC: 142 MMOL/L (ref 135–145)
TESTOST SERPL-MCNC: 709 NG/DL (ref 264–916)
TRIGL SERPL-MCNC: 315 MG/DL (ref 0–149)
VLDLC SERPL CALC-MCNC: 63 MG/DL
WBC # BLD AUTO: 8.11 10*3/MM3 (ref 4.8–10.8)

## 2017-12-08 ENCOUNTER — TELEPHONE (OUTPATIENT)
Dept: FAMILY MEDICINE CLINIC | Facility: CLINIC | Age: 57
End: 2017-12-08

## 2017-12-08 NOTE — TELEPHONE ENCOUNTER
Faxed Testostrone form to [facility] that was filled out by provider . The form stated 90 day supply, but they needed it in Qty . I gave verbal order over phone for 13 vials.

## 2018-05-04 NOTE — PROGRESS NOTES
Mr. Arevalo is 58 y.o. male    CHIEF COMPLAINT: I am here for follow up on BPH.    HPI  Follow up urinary symptoms. Myrbetriq initially helped but then failed. He was only on the 50 mg dose. Uses pseudoephedrine for nasal congestion. Requires him to drink significant amounts of water. He stopped the Myrbetriq. .       The following portions of the patient's history were reviewed and updated as appropriate: allergies, current medications, past family history, past medical history, past social history, past surgical history and problem list.      Review of Systems   Constitutional: Negative for chills and fever.   Gastrointestinal: Negative for abdominal pain, anal bleeding and blood in stool.   Genitourinary: Positive for frequency and urgency. Negative for flank pain and hematuria.           Current Outpatient Prescriptions:   •  azelastine (ASTELIN) 0.1 % nasal spray, 2 sprays into each nostril 2 (Two) Times a Day. Use in each nostril as directed, Disp: 30 mL, Rfl: 3  •  cetirizine (zyrTEC) 10 MG tablet, Take 10 mg by mouth Daily., Disp: , Rfl:   •  fluticasone (FLONASE) 50 MCG/ACT nasal spray, 1 spray into each nostril 2 (Two) Times a Day., Disp: 16 g, Rfl: 3  •  Iron-Vitamins (GERITOL PO), Take  by mouth., Disp: , Rfl:   •  Pseudoephedrine-DM-GG (PSEUDO-GUAIFEN-DEX PO), Take  by mouth Daily., Disp: , Rfl:   •  rosuvastatin (CRESTOR) 10 MG tablet, Take 10 mg by mouth Daily., Disp: , Rfl:   •  Testosterone Cypionate 200 MG/ML kit, Inject 0.75 mL into the shoulder, thigh, or buttocks 1 (One) Time Per Week., Disp: 9 kit, Rfl: 0  •  clarithromycin (BIAXIN) 500 MG tablet, Take 1 tablet by mouth 2 (Two) Times a Day., Disp: 14 tablet, Rfl: 0    Past Medical History:   Diagnosis Date   • Allergic    • Anxiety 8/24/2017   • Hyperlipidemia    • Hypogonadism in male        Past Surgical History:   Procedure Laterality Date   • SINUS SURGERY      x5       Social History     Social History   • Marital status:   "    Social History Main Topics   • Smoking status: Never Smoker   • Smokeless tobacco: Never Used   • Alcohol use No   • Drug use: No   • Sexual activity: Yes     Partners: Female     Other Topics Concern   • Not on file     Social History Narrative    Retired [place] [employer]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        Family History   Problem Relation Age of Onset   • Heart disease Mother    • Cancer Mother      breast   • Breast cancer Mother    • Diabetes Mother    • No Known Problems Father    • No Known Problems Brother    • Colon cancer Neg Hx    • Prostate cancer Neg Hx    • Alcohol abuse Neg Hx    • Drug abuse Neg Hx    • Thyroid cancer Neg Hx    • Thyroid disease Neg Hx          /76   Temp 97.9 °F (36.6 °C)   Ht 182.9 cm (72\")   Wt 91 kg (200 lb 9.6 oz)   BMI 27.21 kg/m²       Physical Exam  Constitutional: The patient  is oriented to person, place, and time. They  appear well-developed and well-nourished. No distress.   Pulmonary/Chest: Effort normal.   Abdominal: Soft. The patient exhibits no distension and no mass. There is no tenderness. There is no rebound and no guarding. No hernia.   Neurological: Patient is alert and oriented to person, place, and time.   Skin: Skin is warm and dry. Not diaphoretic.   Psychiatric:  normal mood and affect.   Vitals reviewed.      Data  Results for orders placed or performed in visit on 05/14/18   POC Urinalysis Dipstick, Automated   Result Value Ref Range    Color Yellow Yellow, Straw, Dark Yellow, Areli    Clarity, UA Clear Clear    Glucose, UA Negative Negative, 1000 mg/dL (3+) mg/dL    Bilirubin Negative Negative    Ketones, UA Negative Negative    Specific Gravity  1.025 1.005 - 1.030    Blood, UA Negative Negative    pH, Urine 6.5 5.0 - 8.0    Protein, POC Negative Negative mg/dL    Urobilinogen, UA Normal Normal    Leukocytes Negative Negative    Nitrite, UA Negative Negative         Imaging Results (last 7 days)     ** No results found for the " last 168 hours. **            Assessment and Plan  Diagnoses and all orders for this visit:    BPH with urinary obstruction  -     POC Urinalysis Dipstick, Automated    Urinary frequency    - Continue caffeine avoidance. If symptoms worsen will try anticholinergic or increase Myrbetriq to 50 mg.     Tony Begum MD  05/14/18  8:28 AM      Cc: Napoleon Regan MD

## 2018-05-14 ENCOUNTER — OFFICE VISIT (OUTPATIENT)
Dept: UROLOGY | Facility: CLINIC | Age: 58
End: 2018-05-14

## 2018-05-14 VITALS
WEIGHT: 200.6 LBS | HEIGHT: 72 IN | BODY MASS INDEX: 27.17 KG/M2 | TEMPERATURE: 97.9 F | SYSTOLIC BLOOD PRESSURE: 138 MMHG | DIASTOLIC BLOOD PRESSURE: 76 MMHG

## 2018-05-14 DIAGNOSIS — N13.8 BPH WITH URINARY OBSTRUCTION: Primary | ICD-10-CM

## 2018-05-14 DIAGNOSIS — R35.0 URINARY FREQUENCY: ICD-10-CM

## 2018-05-14 DIAGNOSIS — N40.1 BPH WITH URINARY OBSTRUCTION: Primary | ICD-10-CM

## 2018-05-14 LAB
BILIRUB BLD-MCNC: NEGATIVE MG/DL
CLARITY, POC: CLEAR
COLOR UR: YELLOW
GLUCOSE UR STRIP-MCNC: NEGATIVE MG/DL
KETONES UR QL: NEGATIVE
LEUKOCYTE EST, POC: NEGATIVE
NITRITE UR-MCNC: NEGATIVE MG/ML
PH UR: 6.5 [PH] (ref 5–8)
PROT UR STRIP-MCNC: NEGATIVE MG/DL
RBC # UR STRIP: NEGATIVE /UL
SP GR UR: 1.02 (ref 1–1.03)
UROBILINOGEN UR QL: NORMAL

## 2018-05-14 PROCEDURE — 81003 URINALYSIS AUTO W/O SCOPE: CPT | Performed by: UROLOGY

## 2018-05-14 PROCEDURE — 99212 OFFICE O/P EST SF 10 MIN: CPT | Performed by: UROLOGY

## 2018-05-14 NOTE — PATIENT INSTRUCTIONS

## 2019-04-29 ENCOUNTER — APPOINTMENT (OUTPATIENT)
Dept: LAB | Facility: HOSPITAL | Age: 59
End: 2019-04-29

## 2019-04-29 ENCOUNTER — LAB (OUTPATIENT)
Dept: LAB | Facility: HOSPITAL | Age: 59
End: 2019-04-29

## 2019-04-29 ENCOUNTER — TRANSCRIBE ORDERS (OUTPATIENT)
Dept: ADMINISTRATIVE | Facility: HOSPITAL | Age: 59
End: 2019-04-29

## 2019-04-29 DIAGNOSIS — E29.1 HYPOGONADISM IN MALE: Primary | ICD-10-CM

## 2019-04-29 LAB
ALBUMIN SERPL-MCNC: 4.2 G/DL (ref 3.5–5)
ALBUMIN/GLOB SERPL: 1.5 G/DL (ref 1.1–2.5)
ALP SERPL-CCNC: 62 U/L (ref 24–120)
ALT SERPL W P-5'-P-CCNC: 30 U/L (ref 0–54)
ANION GAP SERPL CALCULATED.3IONS-SCNC: 9 MMOL/L (ref 4–13)
AST SERPL-CCNC: 25 U/L (ref 7–45)
BASOPHILS # BLD AUTO: 0.03 10*3/MM3 (ref 0–0.2)
BASOPHILS NFR BLD AUTO: 0.4 % (ref 0–2)
BILIRUB SERPL-MCNC: 0.7 MG/DL (ref 0.1–1)
BUN BLD-MCNC: 13 MG/DL (ref 5–21)
BUN/CREAT SERPL: 10.8 (ref 7–25)
CALCIUM SPEC-SCNC: 8.8 MG/DL (ref 8.4–10.4)
CHLORIDE SERPL-SCNC: 103 MMOL/L (ref 98–110)
CO2 SERPL-SCNC: 28 MMOL/L (ref 24–31)
CREAT BLD-MCNC: 1.2 MG/DL (ref 0.5–1.4)
DEPRECATED RDW RBC AUTO: 42.1 FL (ref 40–54)
EOSINOPHIL # BLD AUTO: 0.04 10*3/MM3 (ref 0–0.7)
EOSINOPHIL NFR BLD AUTO: 0.5 % (ref 0–4)
ERYTHROCYTE [DISTWIDTH] IN BLOOD BY AUTOMATED COUNT: 12.6 % (ref 12–15)
GFR SERPL CREATININE-BSD FRML MDRD: 63 ML/MIN/1.73
GLOBULIN UR ELPH-MCNC: 2.8 GM/DL
GLUCOSE BLD-MCNC: 97 MG/DL (ref 70–100)
HCT VFR BLD AUTO: 47.8 % (ref 40–52)
HGB BLD-MCNC: 16.5 G/DL (ref 14–18)
IMM GRANULOCYTES # BLD AUTO: 0.02 10*3/MM3 (ref 0–0.05)
IMM GRANULOCYTES NFR BLD AUTO: 0.2 % (ref 0–5)
LYMPHOCYTES # BLD AUTO: 1.32 10*3/MM3 (ref 0.72–4.86)
LYMPHOCYTES NFR BLD AUTO: 16.1 % (ref 15–45)
MCH RBC QN AUTO: 31.5 PG (ref 28–32)
MCHC RBC AUTO-ENTMCNC: 34.5 G/DL (ref 33–36)
MCV RBC AUTO: 91.4 FL (ref 82–95)
MONOCYTES # BLD AUTO: 0.54 10*3/MM3 (ref 0.19–1.3)
MONOCYTES NFR BLD AUTO: 6.6 % (ref 4–12)
NEUTROPHILS # BLD AUTO: 6.24 10*3/MM3 (ref 1.87–8.4)
NEUTROPHILS NFR BLD AUTO: 76.2 % (ref 39–78)
NRBC BLD AUTO-RTO: 0 /100 WBC (ref 0–0.2)
PLATELET # BLD AUTO: 224 10*3/MM3 (ref 130–400)
PMV BLD AUTO: 10 FL (ref 6–12)
POTASSIUM BLD-SCNC: 4.2 MMOL/L (ref 3.5–5.3)
PROT SERPL-MCNC: 7 G/DL (ref 6.3–8.7)
PSA SERPL-MCNC: 1.33 NG/ML (ref 0–4)
RBC # BLD AUTO: 5.23 10*6/MM3 (ref 4.8–5.9)
SODIUM BLD-SCNC: 140 MMOL/L (ref 135–145)
WBC NRBC COR # BLD: 8.19 10*3/MM3 (ref 4.8–10.8)

## 2019-04-29 PROCEDURE — 36415 COLL VENOUS BLD VENIPUNCTURE: CPT | Performed by: FAMILY MEDICINE

## 2019-04-29 PROCEDURE — G0103 PSA SCREENING: HCPCS | Performed by: FAMILY MEDICINE

## 2019-04-29 PROCEDURE — 80053 COMPREHEN METABOLIC PANEL: CPT | Performed by: FAMILY MEDICINE

## 2019-04-29 PROCEDURE — 84403 ASSAY OF TOTAL TESTOSTERONE: CPT | Performed by: FAMILY MEDICINE

## 2019-04-29 PROCEDURE — 85025 COMPLETE CBC W/AUTO DIFF WBC: CPT | Performed by: FAMILY MEDICINE

## 2019-04-30 LAB — TESTOST SERPL-MCNC: 705 NG/DL (ref 264–916)

## 2020-11-04 ENCOUNTER — HOSPITAL ENCOUNTER (EMERGENCY)
Facility: HOSPITAL | Age: 60
Discharge: HOME | End: 2020-11-05
Attending: EMERGENCY MEDICINE | Admitting: EMERGENCY MEDICINE

## 2020-11-04 DIAGNOSIS — G89.18 POST-OPERATIVE PAIN: Primary | ICD-10-CM

## 2020-11-04 PROCEDURE — 96372 THER/PROPH/DIAG INJ SC/IM: CPT

## 2020-11-04 PROCEDURE — 96374 THER/PROPH/DIAG INJ IV PUSH: CPT

## 2020-11-04 PROCEDURE — 99283 EMERGENCY DEPT VISIT LOW MDM: CPT

## 2020-11-04 PROCEDURE — 96375 TX/PRO/DX INJ NEW DRUG ADDON: CPT

## 2020-11-04 RX ORDER — ONDANSETRON 4 MG/1
4 TABLET, ORALLY DISINTEGRATING ORAL ONCE
Status: DISCONTINUED | OUTPATIENT
Start: 2020-11-05 | End: 2020-11-04

## 2020-11-04 RX ORDER — ONDANSETRON 4 MG/1
4 TABLET, ORALLY DISINTEGRATING ORAL ONCE
Status: COMPLETED | OUTPATIENT
Start: 2020-11-04 | End: 2020-11-05

## 2020-11-05 ENCOUNTER — APPOINTMENT (OUTPATIENT)
Dept: GENERAL RADIOLOGY | Facility: HOSPITAL | Age: 60
End: 2020-11-05

## 2020-11-05 VITALS
DIASTOLIC BLOOD PRESSURE: 84 MMHG | TEMPERATURE: 98.7 F | HEART RATE: 102 BPM | HEIGHT: 72 IN | SYSTOLIC BLOOD PRESSURE: 155 MMHG | RESPIRATION RATE: 18 BRPM | OXYGEN SATURATION: 98 % | BODY MASS INDEX: 27.09 KG/M2 | WEIGHT: 200 LBS

## 2020-11-05 VITALS
HEART RATE: 94 BPM | WEIGHT: 200 LBS | HEIGHT: 72 IN | OXYGEN SATURATION: 95 % | DIASTOLIC BLOOD PRESSURE: 86 MMHG | BODY MASS INDEX: 27.09 KG/M2 | SYSTOLIC BLOOD PRESSURE: 151 MMHG | RESPIRATION RATE: 16 BRPM | TEMPERATURE: 98.4 F

## 2020-11-05 DIAGNOSIS — G89.18 POST-OPERATIVE PAIN: Primary | ICD-10-CM

## 2020-11-05 LAB
ANION GAP SERPL CALCULATED.3IONS-SCNC: 8 MMOL/L (ref 5–15)
BASOPHILS # BLD AUTO: 0.03 10*3/MM3 (ref 0–0.2)
BASOPHILS NFR BLD AUTO: 0.2 % (ref 0–1.5)
BUN SERPL-MCNC: 12 MG/DL (ref 6–20)
BUN/CREAT SERPL: 10 (ref 7–25)
CALCIUM SPEC-SCNC: 9 MG/DL (ref 8.6–10.5)
CHLORIDE SERPL-SCNC: 103 MMOL/L (ref 98–107)
CO2 SERPL-SCNC: 27 MMOL/L (ref 22–29)
CREAT SERPL-MCNC: 1.2 MG/DL (ref 0.76–1.27)
DEPRECATED RDW RBC AUTO: 41.9 FL (ref 37–54)
EOSINOPHIL # BLD AUTO: 0 10*3/MM3 (ref 0–0.4)
EOSINOPHIL NFR BLD AUTO: 0 % (ref 0.3–6.2)
ERYTHROCYTE [DISTWIDTH] IN BLOOD BY AUTOMATED COUNT: 12.9 % (ref 12.3–15.4)
GFR SERPL CREATININE-BSD FRML MDRD: 62 ML/MIN/1.73
GLUCOSE SERPL-MCNC: 151 MG/DL (ref 65–99)
HCT VFR BLD AUTO: 45.4 % (ref 37.5–51)
HGB BLD-MCNC: 16 G/DL (ref 13–17.7)
IMM GRANULOCYTES # BLD AUTO: 0.06 10*3/MM3 (ref 0–0.05)
IMM GRANULOCYTES NFR BLD AUTO: 0.4 % (ref 0–0.5)
LYMPHOCYTES # BLD AUTO: 0.97 10*3/MM3 (ref 0.7–3.1)
LYMPHOCYTES NFR BLD AUTO: 6.6 % (ref 19.6–45.3)
MCH RBC QN AUTO: 31.6 PG (ref 26.6–33)
MCHC RBC AUTO-ENTMCNC: 35.2 G/DL (ref 31.5–35.7)
MCV RBC AUTO: 89.7 FL (ref 79–97)
MONOCYTES # BLD AUTO: 0.76 10*3/MM3 (ref 0.1–0.9)
MONOCYTES NFR BLD AUTO: 5.2 % (ref 5–12)
NEUTROPHILS NFR BLD AUTO: 12.83 10*3/MM3 (ref 1.7–7)
NEUTROPHILS NFR BLD AUTO: 87.6 % (ref 42.7–76)
NRBC BLD AUTO-RTO: 0 /100 WBC (ref 0–0.2)
PLATELET # BLD AUTO: 226 10*3/MM3 (ref 140–450)
PMV BLD AUTO: 9.4 FL (ref 6–12)
POTASSIUM SERPL-SCNC: 3.9 MMOL/L (ref 3.5–5.2)
RBC # BLD AUTO: 5.06 10*6/MM3 (ref 4.14–5.8)
SODIUM SERPL-SCNC: 138 MMOL/L (ref 136–145)
WBC # BLD AUTO: 14.65 10*3/MM3 (ref 3.4–10.8)

## 2020-11-05 PROCEDURE — 96372 THER/PROPH/DIAG INJ SC/IM: CPT

## 2020-11-05 PROCEDURE — 73030 X-RAY EXAM OF SHOULDER: CPT

## 2020-11-05 PROCEDURE — 96374 THER/PROPH/DIAG INJ IV PUSH: CPT

## 2020-11-05 PROCEDURE — 25010000003 HYDROMORPHONE 1 MG/ML SOLUTION: Performed by: EMERGENCY MEDICINE

## 2020-11-05 PROCEDURE — 25010000002 ONDANSETRON PER 1 MG: Performed by: EMERGENCY MEDICINE

## 2020-11-05 PROCEDURE — 63710000001 ONDANSETRON ODT 4 MG TABLET DISPERSIBLE: Performed by: EMERGENCY MEDICINE

## 2020-11-05 PROCEDURE — 96375 TX/PRO/DX INJ NEW DRUG ADDON: CPT

## 2020-11-05 PROCEDURE — 80048 BASIC METABOLIC PNL TOTAL CA: CPT | Performed by: EMERGENCY MEDICINE

## 2020-11-05 PROCEDURE — 25010000002 HYDROMORPHONE PER 4 MG: Performed by: EMERGENCY MEDICINE

## 2020-11-05 PROCEDURE — 99283 EMERGENCY DEPT VISIT LOW MDM: CPT

## 2020-11-05 PROCEDURE — 85025 COMPLETE CBC W/AUTO DIFF WBC: CPT | Performed by: EMERGENCY MEDICINE

## 2020-11-05 PROCEDURE — 25010000002 LORAZEPAM PER 2 MG: Performed by: EMERGENCY MEDICINE

## 2020-11-05 RX ORDER — LORAZEPAM 2 MG/ML
1 INJECTION INTRAMUSCULAR ONCE
Status: COMPLETED | OUTPATIENT
Start: 2020-11-05 | End: 2020-11-05

## 2020-11-05 RX ORDER — PROMETHAZINE HYDROCHLORIDE 25 MG/1
25 TABLET ORAL EVERY 6 HOURS PRN
Qty: 20 TABLET | Refills: 0 | Status: SHIPPED | OUTPATIENT
Start: 2020-11-05 | End: 2022-01-17

## 2020-11-05 RX ORDER — HYDROMORPHONE HCL 110MG/55ML
2 PATIENT CONTROLLED ANALGESIA SYRINGE INTRAVENOUS ONCE
Status: COMPLETED | OUTPATIENT
Start: 2020-11-05 | End: 2020-11-05

## 2020-11-05 RX ORDER — OXYCODONE AND ACETAMINOPHEN 10; 325 MG/1; MG/1
1 TABLET ORAL EVERY 6 HOURS PRN
COMMUNITY
End: 2021-04-05

## 2020-11-05 RX ORDER — ONDANSETRON 2 MG/ML
4 INJECTION INTRAMUSCULAR; INTRAVENOUS ONCE
Status: COMPLETED | OUTPATIENT
Start: 2020-11-05 | End: 2020-11-05

## 2020-11-05 RX ADMIN — HYDROMORPHONE HYDROCHLORIDE 2 MG: 2 INJECTION, SOLUTION INTRAMUSCULAR; INTRAVENOUS; SUBCUTANEOUS at 07:26

## 2020-11-05 RX ADMIN — ONDANSETRON 4 MG: 4 TABLET, ORALLY DISINTEGRATING ORAL at 00:24

## 2020-11-05 RX ADMIN — LORAZEPAM 1 MG: 2 INJECTION INTRAMUSCULAR; INTRAVENOUS at 07:26

## 2020-11-05 RX ADMIN — HYDROMORPHONE HYDROCHLORIDE 1 MG: 1 INJECTION, SOLUTION INTRAMUSCULAR; INTRAVENOUS; SUBCUTANEOUS at 02:02

## 2020-11-05 RX ADMIN — ONDANSETRON HYDROCHLORIDE 4 MG: 2 SOLUTION INTRAMUSCULAR; INTRAVENOUS at 01:43

## 2020-11-05 RX ADMIN — HYDROMORPHONE HYDROCHLORIDE 1 MG: 1 INJECTION, SOLUTION INTRAMUSCULAR; INTRAVENOUS; SUBCUTANEOUS at 00:25

## 2020-11-05 NOTE — ED PROVIDER NOTES
"Subjective   Patient presents with a complaint of severe pain in his right shoulder.  He says he is not slept in 3 days.  He has not been able to eat well because of the pain and the nausea that comes from it.  He says he had a torn rotator cuff and then had surgery yesterday morning.  Since that time the pain is severe and unbearable.  He has tried the Percocet he was prescribed but has been unable to keep it down.  He thinks the nausea is from the pain itself.  He was seen here last time given injections and apparently pain did get somewhat better but did not get relieved and he has not slept all night.  He comes back this morning requesting something else for the pain.  He says he needs to get some sleep sweating get things \"reset\" and start over try to control things better.  He says he called his surgeon's office yesterday and was told to come to the emergency room because Percocet was all they had to offer.      History provided by:  Patient   used: No    Shoulder Injury  Location:  Right shoulder  Quality:  Aching  Severity:  Severe  Onset quality:  Gradual  Duration:  3 days  Timing:  Constant  Progression:  Unchanged  Chronicity:  New  Associated symptoms: no abdominal pain, no congestion, no cough, no ear pain, no fatigue, no headaches, no loss of consciousness, no rash, no shortness of breath, no sore throat and no vomiting        Review of Systems   Constitutional: Negative.  Negative for fatigue.   HENT: Negative.  Negative for congestion, ear pain and sore throat.    Respiratory: Negative.  Negative for cough and shortness of breath.    Cardiovascular: Negative.    Gastrointestinal: Negative.  Negative for abdominal pain and vomiting.   Skin: Negative for rash.   Neurological: Negative.  Negative for loss of consciousness and headaches.   Psychiatric/Behavioral: Negative.    All other systems reviewed and are negative.      Past Medical History:   Diagnosis Date   • Allergic    • " Anxiety 8/24/2017   • Hyperlipidemia    • Hypogonadism in male        No Known Allergies    Past Surgical History:   Procedure Laterality Date   • SINUS SURGERY      x5       Family History   Problem Relation Age of Onset   • Heart disease Mother    • Cancer Mother         breast   • Breast cancer Mother    • Diabetes Mother    • No Known Problems Father    • No Known Problems Brother    • Colon cancer Neg Hx    • Prostate cancer Neg Hx    • Alcohol abuse Neg Hx    • Drug abuse Neg Hx    • Thyroid cancer Neg Hx    • Thyroid disease Neg Hx        Social History     Socioeconomic History   • Marital status:      Spouse name: Not on file   • Number of children: Not on file   • Years of education: Not on file   • Highest education level: Not on file   Tobacco Use   • Smoking status: Never Smoker   • Smokeless tobacco: Never Used   Substance and Sexual Activity   • Alcohol use: No   • Drug use: No   • Sexual activity: Defer     Partners: Female   Social History Narrative    Retired [place] [employer].  [employer] and [employer] business.        Prior to Admission medications    Medication Sig Start Date End Date Taking? Authorizing Provider   azelastine (ASTELIN) 0.1 % nasal spray 2 sprays into each nostril 2 (Two) Times a Day. Use in each nostril as directed 12/4/17   Napoleon Regan MD   cetirizine (zyrTEC) 10 MG tablet Take 10 mg by mouth Daily.    ProviderJesse MD   clarithromycin (BIAXIN) 500 MG tablet Take 1 tablet by mouth 2 (Two) Times a Day. 12/4/17   Napoleon Regan MD   fluticasone (FLONASE) 50 MCG/ACT nasal spray 1 spray into each nostril 2 (Two) Times a Day. 12/4/17   Napoleon Regan MD   Iron-Vitamins (GERITOL PO) Take  by mouth.    ProviderJesse MD   oxyCODONE-acetaminophen (PERCOCET)  MG per tablet Take 1 tablet by mouth Every 6 (Six) Hours As Needed for Moderate Pain .    Jesse De La Fuente MD   promethazine (PHENERGAN) 25 MG tablet Take 1 tablet by mouth  Every 6 (Six) Hours As Needed for Nausea or Vomiting. 11/5/20   Jarrod Rocha MD   Pseudoephedrine-DM-GG (PSEUDO-GUAIFEN-DEX PO) Take  by mouth Daily.    ProviderJesse MD   rosuvastatin (CRESTOR) 10 MG tablet Take 10 mg by mouth Daily.    ProviderJesse MD   Testosterone Cypionate 200 MG/ML kit Inject 0.75 mL into the shoulder, thigh, or buttocks 1 (One) Time Per Week. 12/4/17   Napoleon Regan MD       Medications   HYDROmorphone (DILAUDID) injection 2 mg (2 mg Intramuscular Given 11/5/20 0726)   LORazepam (ATIVAN) injection 1 mg (1 mg Intramuscular Given 11/5/20 0726)       Vitals:    11/05/20 0652   BP: 155/84   Pulse: 102   Resp: 18   Temp: 98.7 °F (37.1 °C)   SpO2: 98%         Objective   Physical Exam  Vitals signs and nursing note reviewed.   Constitutional:       Appearance: Normal appearance.   Musculoskeletal:      Comments: Right shoulder is dressed with bandages and he was wearing a sling and swath shoulder immobilizer device.  He has good distal pulses and sensation.   Skin:     General: Skin is warm and dry.   Neurological:      General: No focal deficit present.      Mental Status: He is alert and oriented to person, place, and time.   Psychiatric:         Mood and Affect: Mood normal.         Behavior: Behavior normal.         Procedures         Lab Results (last 24 hours)     Procedure Component Value Units Date/Time    CBC & Differential [[id number]]  (Abnormal) Collected: 11/05/20 0140    Specimen: Blood Updated: 11/05/20 0151    Narrative:      The following orders were created for panel order CBC & Differential.  Procedure                               Abnormality         Status                     ---------                               -----------         ------                     CBC Auto Differential[[id number]]        Abnormal            Final result                 Please view results for these tests on the individual orders.    Basic Metabolic Panel [[id number]]   (Abnormal) Collected: 11/05/20 0140    Specimen: Blood Updated: 11/05/20 0208     Glucose 151 mg/dL      BUN 12 mg/dL      Creatinine 1.20 mg/dL      Sodium 138 mmol/L      Potassium 3.9 mmol/L      Comment: Slight hemolysis detected by analyzer. Results may be affected.        Chloride 103 mmol/L      CO2 27.0 mmol/L      Calcium 9.0 mg/dL      eGFR Non African Amer 62 mL/min/1.73      BUN/Creatinine Ratio 10.0     Anion Gap 8.0 mmol/L     Narrative:      GFR Normal >60  Chronic Kidney Disease <60  Kidney Failure <15      CBC Auto Differential [[id number]]  (Abnormal) Collected: 11/05/20 0140    Specimen: Blood Updated: 11/05/20 0151     WBC 14.65 10*3/mm3      RBC 5.06 10*6/mm3      Hemoglobin 16.0 g/dL      Hematocrit 45.4 %      MCV 89.7 fL      MCH 31.6 pg      MCHC 35.2 g/dL      RDW 12.9 %      RDW-SD 41.9 fl      MPV 9.4 fL      Platelets 226 10*3/mm3      Neutrophil % 87.6 %      Lymphocyte % 6.6 %      Monocyte % 5.2 %      Eosinophil % 0.0 %      Basophil % 0.2 %      Immature Grans % 0.4 %      Neutrophils, Absolute 12.83 10*3/mm3      Lymphocytes, Absolute 0.97 10*3/mm3      Monocytes, Absolute 0.76 10*3/mm3      Eosinophils, Absolute 0.00 10*3/mm3      Basophils, Absolute 0.03 10*3/mm3      Immature Grans, Absolute 0.06 10*3/mm3      nRBC 0.0 /100 WBC           No orders to display       ED Course  ED Course as of Nov 05 0746   Thu Nov 05, 2020   6722 I told the patient not fully sympathize with his pain.  I have never had that surgery but I have had family members that have had and do know this is very severe pain.  However they consent appropriate for the office to send him to the emergency room to manage his postoperative pain.  Certainly will be glad to give him something immediately on the try to get some relief.  He says the shots last night did give him some relief but not enough to let him sleep.  We will try again the night with something a little more potent.not of anything stronger than what  is given last night I can change the dosages.  Until he will have to follow-up with his surgeon to see about changes or other medications.  He is discharged in stable condition.    [TR]      ED Course User Index  [TR] Mack Garcia Jr., MD          MDM  Number of Diagnoses or Management Options  Post-operative pain: established and worsening  Risk of Complications, Morbidity, and/or Mortality  Presenting problems: moderate  Diagnostic procedures: minimal  Management options: low    Patient Progress  Patient progress: stable      Final diagnoses:   Post-operative pain          Mack Garcia Jr., MD  11/05/20 0719

## 2020-11-05 NOTE — DISCHARGE INSTRUCTIONS
Shant,    If your symptoms return or not improved with the phenergan please return here as you may need adjustment to your narcotic pain medications at that time.

## 2020-11-05 NOTE — ED PROVIDER NOTES
Subjective   59 y/o male who had recent right shoulder surgery by Dr. Mast this morning states he is taking his pain medication but shortly after taking his percocet he will vomit thus he has had no pain medication. He notes pain to his shoulder stating he just wants something to help. He denies fevers or chills, falls or trauma. He arrives in [facility].           Family, social and past history reviewed as below, prior documentation of H and Ps and other documentation are reviewed:    Past Medical History:  No date: Allergic  8/24/2017: Anxiety  No date: Hyperlipidemia  No date: Hypogonadism in male    Past Surgical History:  No date: SINUS SURGERY      Comment:  x5    Social History    Socioeconomic History      Marital status:       Spouse name: Not on file      Number of children: Not on file      Years of education: Not on file      Highest education level: Not on file    Tobacco Use      Smoking status: Never Smoker      Smokeless tobacco: Never Used    Substance and Sexual Activity      Alcohol use: No      Drug use: No      Sexual activity: Yes        Partners: Female    Social History Narrative      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       Family history: reviewed and noncontributory           Review of Systems   All other systems reviewed and are negative.      Past Medical History:   Diagnosis Date   • Allergic    • Anxiety 8/24/2017   • Hyperlipidemia    • Hypogonadism in male        No Known Allergies    Past Surgical History:   Procedure Laterality Date   • SINUS SURGERY      x5       Family History   Problem Relation Age of Onset   • Heart disease Mother    • Cancer Mother         breast   • Breast cancer Mother    • Diabetes Mother    • No Known Problems Father    • No Known Problems Brother    • Colon cancer Neg Hx    • Prostate cancer Neg Hx    • Alcohol abuse Neg Hx    • Drug abuse Neg Hx    • Thyroid cancer Neg Hx    • Thyroid disease Neg Hx        Social History      Socioeconomic History   • Marital status:      Spouse name: Not on file   • Number of children: Not on file   • Years of education: Not on file   • Highest education level: Not on file   Tobacco Use   • Smoking status: Never Smoker   • Smokeless tobacco: Never Used   Substance and Sexual Activity   • Alcohol use: No   • Drug use: No   • Sexual activity: Defer     Partners: Female   Social History Narrative    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            Objective   Physical Exam  Vitals signs and nursing note reviewed.   Constitutional:       Appearance: Normal appearance.   HENT:      Head: Normocephalic.      Nose: Nose normal.      Mouth/Throat:      Mouth: Mucous membranes are moist.   Eyes:      Extraocular Movements: Extraocular movements intact.      Pupils: Pupils are equal, round, and reactive to light.   Musculoskeletal:      Comments: Right shoulder surgical incision looks well, no signs of infection, distlaly pulses and sensation are intact. He is wearing his brace.    Skin:     General: Skin is warm.      Capillary Refill: Capillary refill takes less than 2 seconds.   Neurological:      General: No focal deficit present.      Mental Status: He is alert and oriented to person, place, and time.   Psychiatric:         Mood and Affect: Mood normal.         Behavior: Behavior normal.         Thought Content: Thought content normal.         Procedures           ED Course  ED Course as of Nov 05 0217   Thu Nov 05, 2020   0054 States no improvement with IM medications will switch to IV    [JH]   0213 States his pain is much improved. I explained to him that his nausea could be caused by 1) his pain 2) the percocet or 3) the anesthesia or even a combination of all three. We will try phenergan as an antiemetic and encourge follow up and return. I do see his leukocytosis. I feel this is normal after the surgery given no evidence of infection at this time. Given he feels  much improved and has had no vomiting since arrival I do feel he is okay for dc. I have asked that should his symptoms return he return to me for narcotic adjustment.     [JH]      ED Course User Index  [JH] Jarrod Rocha MD         I explained that percocet does have a side effect of nausea and vomiting. We will do IM dilaudid and zofran and see how he does. If does well we will try zofran ODT.                                   MDM    Final diagnoses:   Post-operative pain            Jarrod Rocha MD  11/05/20 3809

## 2020-11-16 NOTE — PROGRESS NOTES
Mr. Arevalo is 60 y.o. male    CHIEF COMPLAINT: Follow up BPH    HPI  He returns today with decreasing flow of urinary stream. His stream is described as dribbling.  He has had obstructive voiding symptoms since about 2017.  Anatomically all his complaints are confined to lower urinary tract.  At one point he was on both mirabegron and tamsulosin.  The former really did not help his urgency and frequency much so he stopped it on his own about 2 years ago.  He continues tamsulosin but he takes it on an intermittent basis.      The following portions of the patient's history were reviewed and updated as appropriate: allergies, current medications, past family history, past medical history, past social history, past surgical history and problem list.      Review of Systems   Constitutional: Negative for chills and fever.   Gastrointestinal: Negative for abdominal pain, anal bleeding and blood in stool.   Genitourinary: Negative for dysuria, frequency, hematuria and urgency.         Current Outpatient Medications:   •  azelastine (ASTELIN) 0.1 % nasal spray, 2 sprays into each nostril 2 (Two) Times a Day. Use in each nostril as directed, Disp: 30 mL, Rfl: 3  •  cetirizine (zyrTEC) 10 MG tablet, Take 10 mg by mouth Daily., Disp: , Rfl:   •  fluticasone (FLONASE) 50 MCG/ACT nasal spray, 1 spray into each nostril 2 (Two) Times a Day., Disp: 16 g, Rfl: 3  •  Iron-Vitamins (GERITOL PO), Take  by mouth., Disp: , Rfl:   •  Testosterone Cypionate 200 MG/ML kit, Inject 0.75 mL into the shoulder, thigh, or buttocks 1 (One) Time Per Week., Disp: 9 kit, Rfl: 0  •  busPIRone (BUSPAR) 5 MG tablet, Take 5 mg by mouth 2 (Two) Times a Day., Disp: , Rfl:   •  clarithromycin (BIAXIN) 500 MG tablet, Take 1 tablet by mouth 2 (Two) Times a Day., Disp: 14 tablet, Rfl: 0  •  FLUoxetine (PROzac) 10 MG capsule, TAKE 1 CAPSULE BY MOUTH ONCE DAILY FOR 30 DAYS, Disp: , Rfl:   •  oxyCODONE-acetaminophen (PERCOCET)  MG per tablet, Take 1 tablet  by mouth Every 6 (Six) Hours As Needed for Moderate Pain ., Disp: , Rfl:   •  promethazine (PHENERGAN) 25 MG tablet, Take 1 tablet by mouth Every 6 (Six) Hours As Needed for Nausea or Vomiting., Disp: 20 tablet, Rfl: 0  •  Pseudoephedrine-DM-GG (PSEUDO-GUAIFEN-DEX PO), Take  by mouth Daily., Disp: , Rfl:   •  rosuvastatin (CRESTOR) 10 MG tablet, Take 10 mg by mouth Daily., Disp: , Rfl:   •  tamsulosin (FLOMAX) 0.4 MG capsule 24 hr capsule, Take 1 capsule by mouth Every Night for 360 days., Disp: 30 capsule, Rfl: 11    Past Medical History:   Diagnosis Date   • Allergic    • Anxiety 8/24/2017   • Hyperlipidemia    • Hypogonadism in male        Past Surgical History:   Procedure Laterality Date   • SINUS SURGERY      x5       Social History     Socioeconomic History   • Marital status:      Spouse name: Not on file   • Number of children: Not on file   • Years of education: Not on file   • Highest education level: Not on file   Tobacco Use   • Smoking status: Never Smoker   • Smokeless tobacco: Never Used   Substance and Sexual Activity   • Alcohol use: No   • Drug use: No   • Sexual activity: Defer     Partners: Female   Social History Narrative    Retired [place] [employer].  [employer] and [employer] business.        Family History   Problem Relation Age of Onset   • Heart disease Mother    • Cancer Mother         breast   • Breast cancer Mother    • Diabetes Mother    • No Known Problems Father    • No Known Problems Brother    • Colon cancer Neg Hx    • Prostate cancer Neg Hx    • Alcohol abuse Neg Hx    • Drug abuse Neg Hx    • Thyroid cancer Neg Hx    • Thyroid disease Neg Hx          /85 (BP Location: Left arm, Patient Position: Sitting)   Pulse 80   Temp 98.5 °F (36.9 °C)       Physical Exam  Neuro: Alert and oriented ×3  Const: Not agitated or distressed; Vital signs are reviewed. No obvious deformities  Pulmonary: No respiratory distress  Skin: Without pallor or diaphoresis  GI: Abdomen is  soft and nontender.  No significant distention.  No hernias noted.  : Penis and testicles are normal. Benign feeling prostate without nodule approximately 30 mL in size.         Data  Results for orders placed or performed in visit on 11/24/20   POC Urinalysis Dipstick, Multipro    Specimen: Urine   Result Value Ref Range    Color Yellow Yellow, Straw, Dark Yellow, Areli    Clarity, UA Clear Clear    Glucose, UA Negative Negative, 1000 mg/dL (3+) mg/dL    Bilirubin Negative Negative    Ketones, UA Trace (A) Negative    Specific Gravity  1.030 1.005 - 1.030    Blood, UA Negative Negative    pH, Urine 5.5 5.0 - 8.0    Protein, POC 2+ (A) Negative mg/dL    Urobilinogen, UA Normal Normal    Nitrite, UA Negative Negative    Leukocytes Negative Negative       International Prostate Symptom Score  The following is posted based on patient questionnaire answers:  0 - not at all    1-7 mild symptoms  1- Less than one time in five  8-19 moderate symptoms  2 -Less than half the time  20-35 severe symptoms  3 - About half the time  4 - More than half the time  5 - Almost always     For following sections:  Incomplete Emptying: - How often have you had the sensation  of not emptying your bladder completely after you finished urinating?  3  Frequency: -How often have you had to urinate again less than   two hours after you finished urinating?      2  Intermittency: -How often have you found you stopped and started again  Several times when you urinate?       5  Urgency: -How often do you find it difficult to postpone urination?             1  Weak stream: - How often have you had a weak urinary stream?             4  Straining: - How often have you had to push or strain to begin  Urination?          4  Sleeping: -How many times did you most typically get up to urinate   From the time you went to bed at night until the time you got up in the   0  Morning          Total `  18    Quality of Life  How would you feel if you had to  live with your urinary condition the way   3  It is now, no better, no worse for the rest of your life?    Where: 0=delighted; 1= pleased, 2= mostly satisfied, 3= mixed, 4 = mostly  Dissatisfied, 5= Unhappy, 6 = terrible    Bladder Scan interpretation  Estimation of residual urine via abdominal ultrasound  Residual Urine:14ml  Indication: BPH  Position: Supine  Examination: Incremental scanning of the suprapubic area using 3 MHz transducer using copious amounts of acoustic gel.   Findings: An anechoic area was demonstrated which represented the bladder, with measurement of residual urine as noted. I inspected this myself. In that the residual urine was stable or insignificant, no treatment will be necessary at this time.         Assessment and Plan  Diagnoses and all orders for this visit:    1. BPH with obstruction/lower urinary tract symptoms (Primary)  -     POC Urinalysis Dipstick, Multipro  -     tamsulosin (FLOMAX) 0.4 MG capsule 24 hr capsule; Take 1 capsule by mouth Every Night for 360 days.  Dispense: 30 capsule; Refill: 11    Will restart his tamsulosin.   He was on Myrbetriq. Stopped it couple years ago. This was used for urgency and frequency.      (Please note that portions of this note were completed with a voice recognition program.)  Tony Begum MD  11/24/20  15:20 CST

## 2020-11-24 ENCOUNTER — OFFICE VISIT (OUTPATIENT)
Dept: UROLOGY | Facility: CLINIC | Age: 60
End: 2020-11-24

## 2020-11-24 VITALS — TEMPERATURE: 98.5 F | SYSTOLIC BLOOD PRESSURE: 136 MMHG | DIASTOLIC BLOOD PRESSURE: 85 MMHG | HEART RATE: 80 BPM

## 2020-11-24 DIAGNOSIS — N13.8 BPH WITH OBSTRUCTION/LOWER URINARY TRACT SYMPTOMS: Primary | ICD-10-CM

## 2020-11-24 DIAGNOSIS — N40.1 BPH WITH OBSTRUCTION/LOWER URINARY TRACT SYMPTOMS: Primary | ICD-10-CM

## 2020-11-24 LAB
BILIRUB BLD-MCNC: NEGATIVE MG/DL
CLARITY, POC: CLEAR
COLOR UR: YELLOW
GLUCOSE UR STRIP-MCNC: NEGATIVE MG/DL
KETONES UR QL: ABNORMAL
LEUKOCYTE EST, POC: NEGATIVE
NITRITE UR-MCNC: NEGATIVE MG/ML
PH UR: 5.5 [PH] (ref 5–8)
PROT UR STRIP-MCNC: ABNORMAL MG/DL
RBC # UR STRIP: NEGATIVE /UL
SP GR UR: 1.03 (ref 1–1.03)
UROBILINOGEN UR QL: NORMAL

## 2020-11-24 PROCEDURE — 51798 US URINE CAPACITY MEASURE: CPT | Performed by: UROLOGY

## 2020-11-24 PROCEDURE — 99214 OFFICE O/P EST MOD 30 MIN: CPT | Performed by: UROLOGY

## 2020-11-24 PROCEDURE — 81003 URINALYSIS AUTO W/O SCOPE: CPT | Performed by: UROLOGY

## 2020-11-24 RX ORDER — FLUOXETINE 10 MG/1
CAPSULE ORAL
Status: ON HOLD | COMMUNITY
Start: 2020-11-20 | End: 2022-01-03

## 2020-11-24 RX ORDER — TAMSULOSIN HYDROCHLORIDE 0.4 MG/1
1 CAPSULE ORAL NIGHTLY
Qty: 30 CAPSULE | Refills: 11 | Status: SHIPPED | OUTPATIENT
Start: 2020-11-24 | End: 2021-11-19

## 2020-11-24 RX ORDER — BUSPIRONE HYDROCHLORIDE 5 MG/1
5 TABLET ORAL 2 TIMES DAILY
Status: ON HOLD | COMMUNITY
Start: 2020-11-20 | End: 2022-01-03

## 2020-12-23 ENCOUNTER — TRANSCRIBE ORDERS (OUTPATIENT)
Dept: PHYSICAL THERAPY | Facility: CLINIC | Age: 60
End: 2020-12-23

## 2020-12-23 DIAGNOSIS — Z98.890 STATUS POST ROTATOR CUFF SURGERY: Primary | ICD-10-CM

## 2020-12-28 ENCOUNTER — TREATMENT (OUTPATIENT)
Dept: PHYSICAL THERAPY | Facility: CLINIC | Age: 60
End: 2020-12-28

## 2020-12-28 DIAGNOSIS — Z78.9 IMPAIRED MOBILITY AND ADLS: ICD-10-CM

## 2020-12-28 DIAGNOSIS — M25.511 CHRONIC RIGHT SHOULDER PAIN: ICD-10-CM

## 2020-12-28 DIAGNOSIS — Z98.890 S/P RIGHT ROTATOR CUFF REPAIR: Primary | ICD-10-CM

## 2020-12-28 DIAGNOSIS — G89.29 CHRONIC RIGHT SHOULDER PAIN: ICD-10-CM

## 2020-12-28 DIAGNOSIS — Z74.09 IMPAIRED MOBILITY AND ADLS: ICD-10-CM

## 2020-12-28 PROCEDURE — 97140 MANUAL THERAPY 1/> REGIONS: CPT | Performed by: PHYSICAL THERAPIST

## 2020-12-28 PROCEDURE — 97161 PT EVAL LOW COMPLEX 20 MIN: CPT | Performed by: PHYSICAL THERAPIST

## 2020-12-28 NOTE — PROGRESS NOTES
"Physical Therapy Initial Evaluation and Plan of Care    Patient: Shant Arevalo   : 1960  Referring practitioner: Minh Mast MD  Date of Initial Visit: 2020  Today's Date: 2020  Patient seen for 1 sessions    Visit Diagnoses:    ICD-10-CM ICD-9-CM   1. S/P right rotator cuff repair  Z98.890 V45.89       SUBJECTIVE     Outcome Measure: QuickDASH: will get next visit  Subjective Evaluation    History of Present Illness  Date of surgery: 2020    Subjective comment: He hurts R shoulder 3X prior to surgery, has had pain and difficulty with R shoulder for years. He had L shoulder repair 2 years ago, but not \"as bad\", reports just \"cleaned up\". He had complete supraspinatus tear into infraspinatus, with repair and right proximal biceps tendon tear with tenodesis. He reports being out of sling for 3 weeks and was told he didn't have any restrictions. He does report feeling like something moving/\"rope going over corner\" when he reaches and uses his arm.   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    Palpation     Right   Hypertonic in the anterior deltoid, biceps, levator scapulae, pectoralis major, pectoralis minor, posterior deltoid and upper trapezius.   Hypotonic in the infraspinatus, subscapularis, teres major and teres minor.   Trigger point to anterior deltoid and posterior deltoid.     Active Range of Motion   Left Shoulder   Normal active range of motion    Right Shoulder "   Flexion: 125 (coming down worse) degrees with pain  Abduction: 90 degrees   External rotation 45°: 35 (only at end range) degrees with pain    Joint Play     Right Shoulder  Hypomobile in the posterior capsule.     Strength/Myotome Testing     Additional Strength Details  Not formally tested secondary to post op    Tests     Additional Tests Details  Not formally tested secondary to post op      Therapy Education/Self Care 86218   Details: Medbridge   Medbridge Code: [id number]   Given Home Exercise Program, postural retraining and symptom relief   Progress: New   Who provided to: Patient   Level of understanding Verbalized and Demonstrated   Timed Minutes      Manual Therapy     46668  Comments   IASTM to deltoid with edge tool and free up Min-mod OP   STM to UT/LS Min-mod OP   GH P-As 1/2           Timed Minutes 15     Total Timed Treatment:     15  mins  Total Time of Visit:            65  mins    ASSESSMENT/PLAN     Assessment & Plan     Assessment  Impairments: abnormal or restricted ROM, impaired physical strength and pain with function  Assessment details: Shant presents s/p R rotator cuff repair and bicep tenodesis on November 4. He reports trying to do therapy on himself, but realizing he needed additional help. He has been out of his sling for 3 weeks and reports no restrictions and doing everything he wants. Today he presents with decreased ROM and strength and overall poor quality movement. Skilled PT needed to address these deficits and progress towards independent HEP. There is a protocol on file from Dr. Mast.   Prognosis: good  Prognosis details: Initially work on quality of ROM and AROM. Progress with RROM as able. Progress toward independent HEP.   Functional Limitations: carrying objects, lifting, pulling, pushing, uncomfortable because of pain, moving in bed, reaching behind back, reaching overhead and unable to perform repetitive tasks    Goals   STG by: 1/27/20   Comments Status   Pt will  demonstrate equal shoulder flexion (B) with pain no greater than 1-2/10.  New   Pt will be able to dress with pain no greater than 1-2/10.   New     New     New   LTG by: 2/10/20     Pt will be independent with HEP including cuff and UE strengthening.   New   Pt will demonstrate 4/5 or greater RC strength.   New   Pt will report being able to return to ADLs with pain no greater than 1-2/10.   New   Pt will score 10 or less on the Quick DASH.   New                 PT SIGNATURE: Irma Newsome, PT DPT   DATE TREATMENT INITIATED: 12/28/2020    Initial Certification  Certification Period: 3/28/2021  I certify that the therapy services are furnished while this patient is under my care.  The services outlined above are required by this patient, and will be reviewed every 90 days.     PHYSICIAN: Minh Mast MD______________________________________________DATE: ___________________     Please sign and return via fax to [phone].   Thank you so much for letting us work with Shant. I appreciate your letting us work with your patients. If you have any questions or concerns, please don't hesitate to contact me.

## 2020-12-28 NOTE — PROGRESS NOTES
Physical Therapy Initial Evaluation and Plan of Care    Patient: Shant Arevalo   : 1960  Referring practitioner: Minh Mast MD  Date of Initial Visit: 2020  Today's Date: 2020  Patient seen for 1 sessions    Visit Diagnoses:  No diagnosis found.    SUBJECTIVE     Outcome Measure: {PT subjective questionnaires:04773}  Subjective     OBJECTIVE     Objective   Therapy Education/Self Care 22484   Details: ***   Medbridge Code: ***   Given {Patient Education Given:40361}   Progress: {Patient education Progress:75077}   Who provided to: {Patient education Who Provided to:53919}   Level of understanding {Patient education level of understandin}   Timed Minutes        Total Timed Treatment:     ***   mins  Total Time of Visit:            ***   mins    ASSESSMENT/PLAN     Assessment/Plan  Goals   STG by: *** Comments Status     New     New     New     New   LTG by: ***       New     New     New     New     New     New       PT SIGNATURE: Irma Newsome, PT DPT   DATE TREATMENT INITIATED: 2020    {Certification Type:[id number]}  Certification Period: 3/28/2021  I certify that the therapy services are furnished while this patient is under my care.  The services outlined above are required by this patient, and will be reviewed every 90 days.     PHYSICIAN: Minh Mast MD______________________________________________DATE: ___________________     Please sign and return via fax to [phone].   Thank you so much for letting us work with Shant. I appreciate your letting us work with your patients. If you have any questions or concerns, please don't hesitate to contact me.

## 2021-01-04 ENCOUNTER — TREATMENT (OUTPATIENT)
Dept: PHYSICAL THERAPY | Facility: CLINIC | Age: 61
End: 2021-01-04

## 2021-01-04 DIAGNOSIS — Z98.890 S/P RIGHT ROTATOR CUFF REPAIR: Primary | ICD-10-CM

## 2021-01-04 DIAGNOSIS — G89.29 CHRONIC RIGHT SHOULDER PAIN: ICD-10-CM

## 2021-01-04 DIAGNOSIS — Z78.9 IMPAIRED MOBILITY AND ADLS: ICD-10-CM

## 2021-01-04 DIAGNOSIS — M25.511 CHRONIC RIGHT SHOULDER PAIN: ICD-10-CM

## 2021-01-04 DIAGNOSIS — Z74.09 IMPAIRED MOBILITY AND ADLS: ICD-10-CM

## 2021-01-04 PROCEDURE — 97110 THERAPEUTIC EXERCISES: CPT | Performed by: PHYSICAL THERAPIST

## 2021-01-04 PROCEDURE — 97140 MANUAL THERAPY 1/> REGIONS: CPT | Performed by: PHYSICAL THERAPIST

## 2021-01-04 PROCEDURE — 97032 APPL MODALITY 1+ESTIM EA 15: CPT | Performed by: PHYSICAL THERAPIST

## 2021-01-04 NOTE — PROGRESS NOTES
Outpatient Physical Therapy Ortho Treatment Note       Patient Name: Shant Arevalo  : 1960  MRN: [id number]  Today's Date: 2021      Visit Date: 2021    Visit Dx:    ICD-10-CM ICD-9-CM   1. S/P right rotator cuff repair  Z98.890 V45.89   2. Impaired mobility and ADLs  Z74.09 V49.89    Z78.9    3. Chronic right shoulder pain  M25.511 719.41    G89.29 338.29       Patient Active Problem List   Diagnosis   • Acute recurrent maxillary sinusitis   • Anxiety   • ETD (eustachian tube dysfunction)   • Environmental and seasonal allergies   • Acute recurrent pansinusitis   • Hypogonadism in male        Past Medical History:   Diagnosis Date   • Allergic    • Anxiety 2017   • Hyperlipidemia    • Hypogonadism in male         Past Surgical History:   Procedure Laterality Date   • SINUS SURGERY      x5                       PT Assessment/Plan     Row Name 21 0931          PT Assessment    Assessment Comments  Today was Mr. Pantoja's first visit since his initial evaluation, therefore no goals have been met at this time. He complained of increased inflammation limiting his ROM, therefore, we utilized the LASERstim to decrease inflammation. Mobilizations were performed to his R shoulder followed by PROM. We then focused on strengthing surrounding joints and he did very well. His pain was unchanged post-session, however he did not have much pain to begin with.   -        PT Plan    PT Plan Comments  Continue utilize LASERstim and increase his GH ROM  and strengthen surrounding musculature.  -       User Key  (r) = Recorded By, (t) = Taken By, (c) = Cosigned By    Initials Name Provider Type     Autumn Wiseman PTA Physical Therapy Assistant          Modalities     Row Name 21 0973             ELECTRICAL STIMULATION    Attended/Unattended  Attended  -      Stimulation Type  -- LASERstim  -      Max mAmp  -- chronic-inflammation mode   -      Location/Electrode Placement/Other  R anterior  shoulder  -      95278 - PT E-Stim Attended (Manual) Minutes  10  -AH        User Key  (r) = Recorded By, (t) = Taken By, (c) = Cosigned By    Initials Name Provider Type     Autumn Wiseman PTA Physical Therapy Assistant        OP Exercises     Row Name 01/04/21 0931             Subjective Comments    Subjective Comments  90% of his pain is inflammation. Laying down, his pain is best. There's a little pain with some of his HEP.   -         Subjective Pain    Able to rate subjective pain?  yes  -      Pre-Treatment Pain Level  0  -      Post-Treatment Pain Level  0  -         Total Minutes    01775 - PT Therapeutic Exercise Minutes  15  -AH      65685 - PT Manual Therapy Minutes  20  -AH         Exercise 1    Exercise Name 1  supine R bicep curls  -      Cueing 1  Verbal;Demo  -      Sets 1  3  -AH      Reps 1  10  -AH      Additional Comments  first 2 sets w/ 1 lb, third set w/ 2#   -         Exercise 2    Exercise Name 2  standing R tricep pull downs w/ yellow TB   -      Cueing 2  Verbal;Demo  -      Sets 2  2  -AH      Reps 2  10  -AH         Exercise 3    Exercise Name 3  R seated wrist extension curls   -      Cueing 3  Verbal;Tactile;Demo  -      Sets 3  2  -AH      Reps 3  10  -AH      Additional Comments  1#  -         Exercise 4    Exercise Name 4  R seated wrist flexion curls   -      Cueing 4  Verbal;Tactile;Demo  -      Sets 4  2  -AH      Reps 4  10  -AH      Additional Comments  1#  -        User Key  (r) = Recorded By, (t) = Taken By, (c) = Cosigned By    Initials Name Provider Type     Autumn Wiseman PTA Physical Therapy Assistant                      Manual Rx (last 36 hours)      Manual Treatments     Row Name 01/04/21 0931             Total Minutes    51543 - PT Manual Therapy Minutes  20  -AH         Manual Rx 1    Manual Rx 1 Location  R GH   -      Manual Rx 1 Type  posterior mobilizations  -         Manual Rx 2    Manual Rx 2 Location  R GH  -       Manual Rx 2 Type  PROM into flex/ext, abd/add, and IR/ER   -        User Key  (r) = Recorded By, (t) = Taken By, (c) = Cosigned By    Initials Name Provider Type    Autumn Little PTA Physical Therapy Assistant          PT OP Goals     Row Name 01/04/21 0931          PT Short Term Goals    STG Date to Achieve  01/27/21  -     STG 1  Pt will demonstrate equal shoulder flexion (B) with pain no greater than 1-2/10.  -     STG 1 Progress  Ongoing  -     STG 1 Progress Comments  Pt. is limited in his R GH ROM; addressed w/ PROM and mobilization   -     STG 2  Pt will be able to dress with pain no greater than 1-2/10.   -     STG 2 Progress  New  -        Long Term Goals    LTG Date to Achieve  02/26/21  -     LTG 1  Pt will be independent with HEP including cuff and UE strengthening.   -     LTG 1 Progress  New  -     LTG 2  Pt will demonstrate 4/5 or greater RC strength.   -     LTG 2 Progress  New  -     LTG 3  Pt will report being able to return to ADLs with pain no greater than 1-2/10.   -     LTG 3 Progress  New  -     LTG 4  Pt will score 10 or less on the Quick DASH.   [facility]  [facility]  [facility]        Time Calculation    PT Goal Re-Cert Due Date  01/27/21  -       User Key  (r) = Recorded By, (t) = Taken By, (c) = Cosigned By    Initials Name Provider Type    Autumn Little PTA Physical Therapy Assistant          Therapy Education  Given: Symptoms/condition management, Pain management, Posture/body mechanics  Program: Reinforced  How Provided: Verbal  Provided to: Patient  Level of Understanding: Verbalized              Time Calculation:                    Autumn Wiseman PTA  1/4/2021

## 2021-01-05 ENCOUNTER — OFFICE VISIT (OUTPATIENT)
Dept: UROLOGY | Facility: CLINIC | Age: 61
End: 2021-01-05

## 2021-01-05 VITALS — WEIGHT: 203.8 LBS | TEMPERATURE: 97 F | HEIGHT: 72 IN | BODY MASS INDEX: 27.6 KG/M2

## 2021-01-05 DIAGNOSIS — R31.29 MICROSCOPIC HEMATURIA: ICD-10-CM

## 2021-01-05 DIAGNOSIS — N13.8 BPH WITH OBSTRUCTION/LOWER URINARY TRACT SYMPTOMS: Primary | ICD-10-CM

## 2021-01-05 DIAGNOSIS — N40.1 BPH WITH OBSTRUCTION/LOWER URINARY TRACT SYMPTOMS: Primary | ICD-10-CM

## 2021-01-05 LAB
BILIRUB BLD-MCNC: NEGATIVE MG/DL
CLARITY, POC: CLEAR
COLOR UR: ABNORMAL
GLUCOSE UR STRIP-MCNC: NEGATIVE MG/DL
KETONES UR QL: NEGATIVE
LEUKOCYTE EST, POC: NEGATIVE
NITRITE UR-MCNC: NEGATIVE MG/ML
PH UR: 6 [PH] (ref 5–8)
PROT UR STRIP-MCNC: ABNORMAL MG/DL
RBC # UR STRIP: ABNORMAL /UL
SP GR UR: 1.02 (ref 1–1.03)
UROBILINOGEN UR QL: NORMAL

## 2021-01-05 PROCEDURE — 81003 URINALYSIS AUTO W/O SCOPE: CPT | Performed by: PHYSICIAN ASSISTANT

## 2021-01-05 PROCEDURE — 99214 OFFICE O/P EST MOD 30 MIN: CPT | Performed by: PHYSICIAN ASSISTANT

## 2021-01-05 RX ORDER — QUETIAPINE FUMARATE 50 MG/1
50 TABLET, EXTENDED RELEASE ORAL DAILY
COMMUNITY
Start: 2020-12-31 | End: 2022-01-17 | Stop reason: SDUPTHER

## 2021-01-05 RX ORDER — IBUPROFEN 800 MG/1
800 TABLET ORAL
Status: ON HOLD | COMMUNITY
Start: 2020-12-18 | End: 2022-01-03

## 2021-01-05 RX ORDER — NEEDLES, DISPOSABLE 18GX1 1/2"
NEEDLE, DISPOSABLE MISCELLANEOUS
Status: ON HOLD | COMMUNITY
Start: 2020-12-29 | End: 2022-01-03

## 2021-01-14 ENCOUNTER — TREATMENT (OUTPATIENT)
Dept: PHYSICAL THERAPY | Facility: CLINIC | Age: 61
End: 2021-01-14

## 2021-01-14 DIAGNOSIS — Z98.890 S/P RIGHT ROTATOR CUFF REPAIR: Primary | ICD-10-CM

## 2021-01-14 DIAGNOSIS — M25.511 CHRONIC RIGHT SHOULDER PAIN: ICD-10-CM

## 2021-01-14 DIAGNOSIS — Z74.09 IMPAIRED MOBILITY AND ADLS: ICD-10-CM

## 2021-01-14 DIAGNOSIS — G89.29 CHRONIC RIGHT SHOULDER PAIN: ICD-10-CM

## 2021-01-14 DIAGNOSIS — Z78.9 IMPAIRED MOBILITY AND ADLS: ICD-10-CM

## 2021-01-14 PROCEDURE — 97140 MANUAL THERAPY 1/> REGIONS: CPT | Performed by: PHYSICAL THERAPIST

## 2021-01-14 PROCEDURE — 97032 APPL MODALITY 1+ESTIM EA 15: CPT | Performed by: PHYSICAL THERAPIST

## 2021-01-14 NOTE — PROGRESS NOTES
Physical Therapy Treatment Note    Patient:           Shant Arevalo            : 1960  Today's Date: 2021  Referring practitioner:    Minh Mast MD  Date of Initial Visit:          Type: THERAPY  Noted: 2020    Patient seen for 3 sessions    Visit Diagnoses:    ICD-10-CM ICD-9-CM   1. S/P right rotator cuff repair  Z98.890 V45.89   2. Impaired mobility and ADLs  Z74.09 V49.89    Z78.9    3. Chronic right shoulder pain  M25.511 719.41    G89.29 338.29     SUBJECTIVE     Subjective He reports on and off R shoulder shoulder swelling, he has been managing his pain with medication     PAIN: pre:*6**/10 with movement post 3/10  Modalities Comments   Laserstim Chronic Inflammation Mode R shoulder globally In supine       Timed Minutes *10**     Manual Therapy     18283  Comments   IASTM with EDGE toolR anterior deltoid and superior bicep     Prone thoracic extension mobilizations Grades 2 and 3                Timed Minutes *25**        Therapeutic Exercises    42810 Comments   R shoulder isometric holds @45, 90, and 120 degrees flexion in supine with manual perturbation                     Timed Minutes **5*          OBJECTIVE     Objective       Therapy Education/Self Care 42547   Details:    [employer] Code:    Given postural retraining and symptom relief   Progress: Reinforced   Who provided to: Patient   Level of understanding Verbalized   Timed Minutes      Total Timed Treatment:     40   mins  Total Time of Visit:            *40**   mins         ASSESSMENT/PLAN     Goals   STG by: *21* Comments Status   Pt will demonstrate equal shoulder flexion (B) with pain no greater than 1-2/10.  Ongoing   Pt. is limited in his R GH ROM; addressed w/ PROM and mobilization     Ongoing   Pt will be able to dress with pain no greater than 1-2/10  Ongoing        LTG by: 21     Pt will be independent with HEP including cuff and UE strengthening  Ongoing   Pt will demonstrate 4/5 or greater RC strength   Ongoing    Pt will report being able to return to ADLs with pain no greater than 1-2/10  Ongoing   Pt will score 10 or less on the Quick DASH.  Ongoing                 Assessment/Plan     ASSESSMENT: His upper thoracic region was significantly restricted  Today. He has had a POC lapse and today was his second visit since the initial evaluation. It seems difficult for him to assign a numeric value to his pain.    PLAN: Continue Laserstim, thoracic mobilizations, and progress his strengthening    Quentin Decker, LEDA  Physical Therapy Assistant

## 2021-01-19 ENCOUNTER — TREATMENT (OUTPATIENT)
Dept: PHYSICAL THERAPY | Facility: CLINIC | Age: 61
End: 2021-01-19

## 2021-01-19 DIAGNOSIS — Z98.890 S/P RIGHT ROTATOR CUFF REPAIR: Primary | ICD-10-CM

## 2021-01-19 DIAGNOSIS — M25.511 CHRONIC RIGHT SHOULDER PAIN: ICD-10-CM

## 2021-01-19 DIAGNOSIS — G89.29 CHRONIC RIGHT SHOULDER PAIN: ICD-10-CM

## 2021-01-19 DIAGNOSIS — Z74.09 IMPAIRED MOBILITY AND ADLS: ICD-10-CM

## 2021-01-19 DIAGNOSIS — Z78.9 IMPAIRED MOBILITY AND ADLS: ICD-10-CM

## 2021-01-19 PROCEDURE — 97032 APPL MODALITY 1+ESTIM EA 15: CPT | Performed by: PHYSICAL THERAPIST

## 2021-01-19 PROCEDURE — 97110 THERAPEUTIC EXERCISES: CPT | Performed by: PHYSICAL THERAPIST

## 2021-01-19 PROCEDURE — 97140 MANUAL THERAPY 1/> REGIONS: CPT | Performed by: PHYSICAL THERAPIST

## 2021-01-19 NOTE — PROGRESS NOTES
Physical Therapy Treatment Note    Patient:           Shant Arevalo            : 1960  Today's Date: 2021  Referring practitioner:    Minh Mast MD  Date of Initial Visit:          Type: THERAPY  Noted: 2020    Patient seen for 4 sessions    Visit Diagnoses:    ICD-10-CM ICD-9-CM   1. S/P right rotator cuff repair  Z98.890 V45.89   2. Impaired mobility and ADLs  Z74.09 V49.89    Z78.9    3. Chronic right shoulder pain  M25.511 719.41    G89.29 338.29     SUBJECTIVE     Subjective He hasn't had much pain since his last session, just tightness and swelling. He hasn't noticed much difference in his swelling, stating it goes back and forth.     PAIN: no pain at rest, pre session; no pain post-session at rest    Modalities Comments   Laserstim Chronic Inflammation Mode R shoulder globally In supine       Timed Minutes 10     Manual Therapy     99030  Comments   IASTM with EDGE toolR anterior deltoid and superior bicep     R GH posterior mobilizations in supine                Timed Minutes 20        Therapeutic Exercises    19008 Comments   R shoulder isometric holds @90 degrees flexion in supine with manual perturbation     Pulleys into flexion 2x10   R isometric ball squeezes against wall into flexion and abduction 2x10 each            Timed Minutes 15          OBJECTIVE     Objective       Therapy Education/Self Care 22962   Details:    Medbridge Code:    Given postural retraining and symptom relief   Progress: Reinforced   Who provided to: Patient   Level of understanding Verbalized   Timed Minutes      Total Timed Treatment:     45   mins  Total Time of Visit:            45   mins         ASSESSMENT/PLAN     Goals   STG by: *21* Comments Status   Pt will demonstrate equal shoulder flexion (B) with pain no greater than 1-2/10.  Ongoing   Pt will be able to dress with pain no greater than 1-2/10  Ongoing        LTG by: 21     Pt will be independent with HEP including cuff and UE  strengthening  Ongoing   Pt will demonstrate 4/5 or greater RC strength Progressed isometric strengthening this date  Ongoing    Pt will report being able to return to ADLs with pain no greater than 1-2/10  Ongoing   Pt will score 10 or less on the Quick DASH.  Ongoing                 Assessment/Plan     ASSESSMENT: We continued to utilize LASERstim and address his soft tissue restrictions w/ EDGE tool. Most of his guarding was in his anterior deltoid/pec minor. He had pain w/ eccentric lowering of his R arm in supine between sets of isometric perturbations. He demonstrated hypomobility with the pulleys and at end range, his pain elevated to a 6/10 but resolved after each rep. He did well w/ progressed isometric strengthening as well.     PLAN: Continue Laserstim and work to increase R GH ROM and strength.     Autumn Wiseman PTA  Physical Therapy Assistant

## 2021-01-21 ENCOUNTER — TREATMENT (OUTPATIENT)
Dept: PHYSICAL THERAPY | Facility: CLINIC | Age: 61
End: 2021-01-21

## 2021-01-21 DIAGNOSIS — G89.29 CHRONIC RIGHT SHOULDER PAIN: ICD-10-CM

## 2021-01-21 DIAGNOSIS — M25.511 CHRONIC RIGHT SHOULDER PAIN: ICD-10-CM

## 2021-01-21 DIAGNOSIS — Z98.890 S/P RIGHT ROTATOR CUFF REPAIR: Primary | ICD-10-CM

## 2021-01-21 DIAGNOSIS — Z78.9 IMPAIRED MOBILITY AND ADLS: ICD-10-CM

## 2021-01-21 DIAGNOSIS — Z74.09 IMPAIRED MOBILITY AND ADLS: ICD-10-CM

## 2021-01-21 PROCEDURE — 97140 MANUAL THERAPY 1/> REGIONS: CPT | Performed by: PHYSICAL THERAPIST

## 2021-01-21 PROCEDURE — 97110 THERAPEUTIC EXERCISES: CPT | Performed by: PHYSICAL THERAPIST

## 2021-01-21 PROCEDURE — 97032 APPL MODALITY 1+ESTIM EA 15: CPT | Performed by: PHYSICAL THERAPIST

## 2021-01-21 NOTE — PROGRESS NOTES
Physical Therapy Treatment Note    Patient:           Shant Arevalo            : 1960  Today's Date: 2021  Referring practitioner:    Minh Mast MD  Date of Initial Visit:          Type: THERAPY  Noted: 2020    Patient seen for 5 sessions    Visit Diagnoses:    ICD-10-CM ICD-9-CM   1. S/P right rotator cuff repair  Z98.890 V45.89   2. Impaired mobility and ADLs  Z74.09 V49.89    Z78.9    3. Chronic right shoulder pain  M25.511 719.41    G89.29 338.29     SUBJECTIVE     Subjective He feels a little better every day. He denies soreness as well. His pain at worse is a 5/10 when it's swollen.     PAIN: no pain at rest, pre session; no pain post-session at rest    Modalities Comments   Laserstim Chronic Inflammation Mode R shoulder globally In supine       Timed Minutes 10     Manual Therapy     75657  Comments   IASTM with EDGE toolR anterior deltoid and superior bicep     R GH posterior mobilizations in supine                Timed Minutes 20        Therapeutic Exercises    95640 Comments   R GH PROM into flex/scaption 3 min   Supine AAROM wand flexion (1#)  2x10   R isometric ball squeezes against wall into flexion and abduction 2x10 each    R UE isometric ER TB walkouts against yellow can-do 2x10            Timed Minutes 15          OBJECTIVE     Objective       Therapy Education/Self Care 46907   Details:    Medbridge Code:    Given postural retraining and symptom relief   Progress: Reinforced   Who provided to: Patient   Level of understanding Verbalized   Timed Minutes      Total Timed Treatment:     45   mins  Total Time of Visit:            45   mins         ASSESSMENT/PLAN     Goals   STG by: *21* Comments Status   Pt will demonstrate equal shoulder flexion (B) with pain no greater than 1-2/10. Demonstrated improved shoulder flexion during AAROM wand flex/ext in supine  Ongoing   Pt will be able to dress with pain no greater than 1-2/10  Ongoing        LTG by: 21     Pt will be  independent with HEP including cuff and UE strengthening  Ongoing   Pt will demonstrate 4/5 or greater RC strength  Ongoing    Pt will report being able to return to ADLs with pain no greater than 1-2/10  Ongoing   Pt will score 10 or less on the Quick DASH.  Ongoing                 Assessment/Plan     ASSESSMENT: LASERstim was utilized again as well as EDGE tool. He continues to present w/ guarding anteriorly and does respond well to IASTM. He was very good today about relaxing during PROM and we lightly progressed R GH and postural strengthening. Using the yellow theraloop for ER walkouts did not provide much challenge for him. We will try red next visit.     PLAN: Assess goals for progress note! Continue Laserstim and work to increase R GH ROM and strength. Progress to red theraloop next visit. Bolster HEP.     Autumn Wiseman PTA  Physical Therapy Assistant

## 2021-01-26 ENCOUNTER — TREATMENT (OUTPATIENT)
Dept: PHYSICAL THERAPY | Facility: CLINIC | Age: 61
End: 2021-01-26

## 2021-01-26 DIAGNOSIS — Z78.9 IMPAIRED MOBILITY AND ADLS: ICD-10-CM

## 2021-01-26 DIAGNOSIS — G89.29 CHRONIC RIGHT SHOULDER PAIN: ICD-10-CM

## 2021-01-26 DIAGNOSIS — M25.511 CHRONIC RIGHT SHOULDER PAIN: ICD-10-CM

## 2021-01-26 DIAGNOSIS — Z98.890 S/P RIGHT ROTATOR CUFF REPAIR: Primary | ICD-10-CM

## 2021-01-26 DIAGNOSIS — Z74.09 IMPAIRED MOBILITY AND ADLS: ICD-10-CM

## 2021-01-26 PROCEDURE — 97530 THERAPEUTIC ACTIVITIES: CPT | Performed by: PHYSICAL THERAPIST

## 2021-01-26 PROCEDURE — 97110 THERAPEUTIC EXERCISES: CPT | Performed by: PHYSICAL THERAPIST

## 2021-01-26 PROCEDURE — 97140 MANUAL THERAPY 1/> REGIONS: CPT | Performed by: PHYSICAL THERAPIST

## 2021-01-26 PROCEDURE — 97032 APPL MODALITY 1+ESTIM EA 15: CPT | Performed by: PHYSICAL THERAPIST

## 2021-01-26 NOTE — PROGRESS NOTES
Progress Note Addendum      Patient: Shant Arevalo           : 1960  Today's Date: 2021  Referring practitioner: Minh Mast MD  Date of Initial Visit: Type: THERAPY  Noted: 2020  Patient seen for 6 sessions  Visit Diagnoses:    ICD-10-CM ICD-9-CM   1. S/P right rotator cuff repair  Z98.890 V45.89   2. Impaired mobility and ADLs  Z74.09 V49.89    Z78.9    3. Chronic right shoulder pain  M25.511 719.41    G89.29 338.29       PT G-Codes  Outcome Measure Options: Quick DASH  Quick DASH Score: 13.64  Clinical Progress: improved  Home Program Compliance: Yes  Treatment has included: therapeutic exercise, neuromuscular re-education and manual therapy  Progress toward previous goals: Partially Met  Prognosis to achieve goals: good    Subjective   Objective   Assessment & Plan     Assessment  Impairments: abnormal or restricted ROM, impaired physical strength and pain with function  Prognosis: good  Prognosis details: .   Functional Limitations: carrying objects, lifting, pulling, pushing, uncomfortable because of pain, moving in bed, reaching behind back, reaching overhead and unable to perform repetitive tasks  Plan  Therapy options: will be seen for skilled physical therapy services  Planned modality interventions: TENS and dry needling  Planned therapy interventions: manual therapy, neuromuscular re-education, postural training, soft tissue mobilization, spinal/joint mobilization, strengthening, stretching, therapeutic activities, IADL retraining, home exercise program, functional ROM exercises, flexibility, body mechanics training, abdominal trunk stabilization, ADL retraining and joint mobilization  Frequency: 2x week  Duration in visits: 12  Duration in weeks: 6  Treatment plan discussed with: PTA        I have reviewed the progress note information provided by Quentin Decker PTA, and I concur with the findings.    Irma Newsome, PT DPT  Physical Therapist

## 2021-01-26 NOTE — PROGRESS NOTES
Physical Therapy 30 Day Progress Note    Patient: Shant Arevalo                                                                                     Today's Date: 2021  :     1960    Referring practitioner:    Minh Mast MD  Date of Initial Visit:          Type: THERAPY  Noted: 2020    Patient seen for 6 sessions    Visit Diagnoses:    ICD-10-CM ICD-9-CM   1. S/P right rotator cuff repair  Z98.890 V45.89   2. Impaired mobility and ADLs  Z74.09 V49.89    Z78.9    3. Chronic right shoulder pain  M25.511 719.41    G89.29 338.29     SUBJECTIVE     Subjective He reports his R shoulder is better. He reports he feels 70% recovered thus far.    PAIN: 0/10    PT G-Codes  Outcome Measure Options: Quick DASH  Quick DASH Score: 13.64    OBJECTIVE     Objective      Therapeutic Activities    70572 Comments   Reviewed all goals for progress note (see goals section)    Obtained an updated Quick Dash (see outcome measures section)                Timed Minutes 10     Modalities Comments   Laserstim Chronic Inflammation Mode R shoulder globally In supine       Timed Minutes 10     Manual Therapy     50874  Comments   STM to the R shoulder globally In supine                   Timed Minutes 14     Therapeutic Exercises    16888 Comments   CW standing against the wall with #2    Serratus wall press x 20                Timed Minutes 9          Therapy Education/Self Care 43679   Details:    Medbridge Code:    Given Home Exercise Program   Progress: Reinforced   Who provided to: Patient   Level of understanding Verbalized   Timed Minutes      Total Timed Treatment:     43   mins  Total Time of Visit:            43   mins         ASSESSMENT/PLAN     GOALS  Goals   STG by: 21 Comments Status   Pt will demonstrate equal shoulder flexion (B) with pain no greater than 1-2/10. R shoulder 6/10 with equal flexion Partially met   Pt will be able to dress with pain no greater than 1-2/10  reports no difficulty besides putting  a coat on  Ongoing           LTG by: 2/26/21       Pt will be independent with HEP including cuff and UE strengthening  reinforced today Ongoing   Pt will demonstrate 4/5 or greater RC strength  pain with abduction all motions 4/5 Partially met    Pt will report being able to return to ADLs with pain no greater than 1-2/10  still reports mild difficulty Ongoing   Pt will score 10 or less on the Quick DASH.  13.64 today Ongoing         Assessment/Plan     ASSESSMENT: He has partially met two of his six POC goals at this time. He reports 70% improvement thus far with P.T. in his first five visits.    PLAN: Continue STM and mobilizations as well as reinforce his HEP.    Signature: Quentin Decker, PTA

## 2021-01-28 ENCOUNTER — TREATMENT (OUTPATIENT)
Dept: PHYSICAL THERAPY | Facility: CLINIC | Age: 61
End: 2021-01-28

## 2021-01-28 DIAGNOSIS — Z78.9 IMPAIRED MOBILITY AND ADLS: ICD-10-CM

## 2021-01-28 DIAGNOSIS — M25.511 CHRONIC RIGHT SHOULDER PAIN: ICD-10-CM

## 2021-01-28 DIAGNOSIS — Z98.890 S/P RIGHT ROTATOR CUFF REPAIR: Primary | ICD-10-CM

## 2021-01-28 DIAGNOSIS — Z74.09 IMPAIRED MOBILITY AND ADLS: ICD-10-CM

## 2021-01-28 DIAGNOSIS — G89.29 CHRONIC RIGHT SHOULDER PAIN: ICD-10-CM

## 2021-01-28 PROCEDURE — 97032 APPL MODALITY 1+ESTIM EA 15: CPT | Performed by: PHYSICAL THERAPIST

## 2021-01-28 PROCEDURE — 97140 MANUAL THERAPY 1/> REGIONS: CPT | Performed by: PHYSICAL THERAPIST

## 2021-01-28 NOTE — PROGRESS NOTES
Physical Therapy Treatment Note    Patient:           Shant Arevalo            : 1960  Today's Date: 2021  Referring practitioner:    Minh Mast MD  Date of Initial Visit:          Type: THERAPY  Noted: 2020    Patient seen for 7 sessions    Visit Diagnoses:    ICD-10-CM ICD-9-CM   1. S/P right rotator cuff repair  Z98.890 V45.89   2. Impaired mobility and ADLs  Z74.09 V49.89    Z78.9    3. Chronic right shoulder pain  M25.511 719.41    G89.29 338.29     SUBJECTIVE     Subjective He's feeling good since last visit. He hasn't had pain since last visit. The massage and stretching does the most good.     PAIN: no pain at rest, pre session; no pain post-session at rest    Modalities Comments   Laserstim Chronic Inflammation Mode R shoulder globally In supine       Timed Minutes 10     Manual Therapy     11944  Comments   IASTM with Esmee tool R anterior deltoid and superior bicep     R GH posterior mobilizations in supine followed by PROM into abd and flex/scaption                Timed Minutes 35          OBJECTIVE     Objective       Therapy Education/Self Care 17760   Details:    Medbridge Code:    Given postural retraining and symptom relief   Progress: Reinforced   Who provided to: Patient   Level of understanding Verbalized   Timed Minutes      Total Timed Treatment:     45   mins  Total Time of Visit:            45   mins         ASSESSMENT/PLAN     Goals   STG by: *21* Comments Status   Pt will demonstrate equal shoulder flexion (B) with pain no greater than 1-2/10.  Ongoing   Pt will be able to dress with pain no greater than 1-2/10  Ongoing        LTG by: 21     Pt will be independent with HEP including cuff and UE strengthening  Ongoing   Pt will demonstrate 4/5 or greater RC strength  Ongoing    Pt will report being able to return to ADLs with pain no greater than 1-2/10 He was a bit tender to IASTM in his proximal anterior UE  Ongoing   Pt will score 10 or less on the Quick DASH.   Ongoing                 Assessment/Plan     ASSESSMENT: LASERstim was utilized for the 6th time today. We focused primarily on reducing his guarding and increasing his R GH mobility. He has a few tender areas in his R proximal anterior UE. He responds very well to mobilization and finds PROM helpful.     PLAN: Hold on LASERstim a few treatments and assess response.     Autumn Wiseman PTA  Physical Therapy Assistant

## 2021-02-02 ENCOUNTER — TREATMENT (OUTPATIENT)
Dept: PHYSICAL THERAPY | Facility: CLINIC | Age: 61
End: 2021-02-02

## 2021-02-02 DIAGNOSIS — Z78.9 IMPAIRED MOBILITY AND ADLS: ICD-10-CM

## 2021-02-02 DIAGNOSIS — Z98.890 S/P RIGHT ROTATOR CUFF REPAIR: Primary | ICD-10-CM

## 2021-02-02 DIAGNOSIS — Z74.09 IMPAIRED MOBILITY AND ADLS: ICD-10-CM

## 2021-02-02 DIAGNOSIS — G89.29 CHRONIC RIGHT SHOULDER PAIN: ICD-10-CM

## 2021-02-02 DIAGNOSIS — M25.511 CHRONIC RIGHT SHOULDER PAIN: ICD-10-CM

## 2021-02-02 PROCEDURE — 97032 APPL MODALITY 1+ESTIM EA 15: CPT | Performed by: PHYSICAL THERAPIST

## 2021-02-02 PROCEDURE — 97140 MANUAL THERAPY 1/> REGIONS: CPT | Performed by: PHYSICAL THERAPIST

## 2021-02-02 PROCEDURE — 97110 THERAPEUTIC EXERCISES: CPT | Performed by: PHYSICAL THERAPIST

## 2021-02-02 NOTE — PROGRESS NOTES
Physical Therapy Treatment Note    Patient: Shant Arevalo                                                                                     Today's Date: 2021  :     1960    Referring practitioner:    Minh Mast MD  Date of Initial Visit:          Type: THERAPY  Noted: 2020    Patient seen for 8 sessions    Visit Diagnoses:    ICD-10-CM ICD-9-CM   1. S/P right rotator cuff repair  Z98.890 V45.89   2. Impaired mobility and ADLs  Z74.09 V49.89    Z78.9    3. Chronic right shoulder pain  M25.511 719.41    G89.29 338.29     SUBJECTIVE     Subjective He reports his shoulder is doing well and denies pain right now. Reports last instance of pain was this morning in the shower reaching across to the other shower     PAIN: pre and post 0/10         OBJECTIVE     Objective      Modalities Comments   Laserstim Chronic Inflammation Mode R shoulder globally In supine         Timed Minutes 10     Manual Therapy     45409  Comments   Posterior capsule mobilizations and stretches with intermittent LAD Grade 1 and intermittent pistol glides Grade 2 Both supine and L SL   Inferior glides Grade 2                Timed Minutes 10     Therapeutic Exercises    91952 Comments   Reverse Codmans with #1 x 1 min each x 4    Around the clocks at the wall with red Luz x 3    CW, CCW, + sign at the wall with #2 ball x 10 each     plantargrade BOSU rocking x 10        Timed Minutes 25          Therapy Education/Self Care 92084   Details:    Avvo Code:    Given symptom relief   Progress: Reinforced   Who provided to: Patient   Level of understanding Verbalized   Timed Minutes      Total Timed Treatment:     45   mins  Total Time of Visit:            45   mins         ASSESSMENT/PLAN     GOALS  Goals   STG by: 21 Comments Status   Pt will demonstrate equal shoulder flexion (B) with pain no greater than 1-2/10.  no pain post session today Ongoing   Pt will be able to dress with pain no greater than 1-2/10   Ongoing            LTG by: 2/25/21       Pt will be independent with HEP including cuff and UE strengthening   Ongoing   Pt will demonstrate 4/5 or greater RC strength   Ongoing    Pt will report being able to return to ADLs with pain no greater than 1-2/10  Ongoing   Pt will score 10 or less on the Quick DASH.   Ongoing         Assessment/Plan     ASSESSMENT: He seems to be progressing well and is less symptomatic. He tends to be very brief with his answers when inquiring about his pain symptoms. However, re reported tightness along middle deltoid post posterior capsule stretches and mobilizations.    PLAN: Continue progressing CKC therex activities and closely monitor his symptoms.    Signature: Quentin Decker, PTA

## 2021-02-04 ENCOUNTER — TREATMENT (OUTPATIENT)
Dept: PHYSICAL THERAPY | Facility: CLINIC | Age: 61
End: 2021-02-04

## 2021-02-04 DIAGNOSIS — M25.511 CHRONIC RIGHT SHOULDER PAIN: ICD-10-CM

## 2021-02-04 DIAGNOSIS — G89.29 CHRONIC RIGHT SHOULDER PAIN: ICD-10-CM

## 2021-02-04 DIAGNOSIS — Z98.890 S/P RIGHT ROTATOR CUFF REPAIR: Primary | ICD-10-CM

## 2021-02-04 DIAGNOSIS — Z78.9 IMPAIRED MOBILITY AND ADLS: ICD-10-CM

## 2021-02-04 DIAGNOSIS — Z74.09 IMPAIRED MOBILITY AND ADLS: ICD-10-CM

## 2021-02-04 PROCEDURE — 97140 MANUAL THERAPY 1/> REGIONS: CPT | Performed by: PHYSICAL THERAPIST

## 2021-02-04 PROCEDURE — 97110 THERAPEUTIC EXERCISES: CPT | Performed by: PHYSICAL THERAPIST

## 2021-02-04 NOTE — PROGRESS NOTES
Physical Therapy Treatment Note    Patient: Shant Arevalo                                                                                     Today's Date: 2021  :     1960    Referring practitioner:    Minh Mast MD  Date of Initial Visit:          Type: THERAPY  Noted: 2020    Patient seen for 9 sessions    Visit Diagnoses:    ICD-10-CM ICD-9-CM   1. S/P right rotator cuff repair  Z98.890 V45.89   2. Impaired mobility and ADLs  Z74.09 V49.89    Z78.9    3. Chronic right shoulder pain  M25.511 719.41    G89.29 338.29     SUBJECTIVE     Subjective Pt denies any changes since last visit.     PAIN: pre and post 0/10       OBJECTIVE     Objective     Manual Therapy     00302  Comments   IASTM w/ EDGE tool to R anterior deltoid, biceps, and pec minor     R GH posterior mobilizations                 Timed Minutes 24     Therapeutic Exercises    16486 Comments   R UE supine perturbations w/ 2# weight in hand 3 rounds   Supine AAROM into flex/ext w/ 1# wand 2x10    R UE SA punches 2x10    R UE sleeper stretch  3x30 sec   R UE wall circles  2x10    SA BOSU rocking  2x10           Timed Minutes 20          Therapy Education/Self Care 61349   Details:    Medbridge Code:    Given symptom relief   Progress: Reinforced   Who provided to: Patient   Level of understanding Verbalized   Timed Minutes      Total Timed Treatment:     44   mins  Total Time of Visit:            45   mins         ASSESSMENT/PLAN     GOALS  Goals   STG by: 21 Comments Status   Pt will demonstrate equal shoulder flexion (B) with pain no greater than 1-2/10.   Ongoing   Pt will be able to dress with pain no greater than 1-2/10   Ongoing           LTG by: 21       Pt will be independent with HEP including cuff and UE strengthening   Ongoing   Pt will demonstrate 4/5 or greater RC strength  progressed CKC strengthening today  Ongoing    Pt will report being able to return to ADLs with pain no greater than 1-2/10  Ongoing   Pt will  score 10 or less on the Quick DASH.   Ongoing         Assessment/Plan     ASSESSMENT: He continues to present w/ guarding in his proximal anterior R UE and responds very well to IASTM and mobilizations. We progressed strengthening today and only reported pain w/ the eccentric lowering portion of AAROM wand flex/ext in supine.     PLAN: Continue to work on increasing R GH ROM and scapular strength.     Signature: Autumn Wiseman, PTA

## 2021-02-09 ENCOUNTER — TREATMENT (OUTPATIENT)
Dept: PHYSICAL THERAPY | Facility: CLINIC | Age: 61
End: 2021-02-09

## 2021-02-09 DIAGNOSIS — Z78.9 IMPAIRED MOBILITY AND ADLS: ICD-10-CM

## 2021-02-09 DIAGNOSIS — Z98.890 S/P RIGHT ROTATOR CUFF REPAIR: Primary | ICD-10-CM

## 2021-02-09 DIAGNOSIS — G89.29 CHRONIC RIGHT SHOULDER PAIN: ICD-10-CM

## 2021-02-09 DIAGNOSIS — Z74.09 IMPAIRED MOBILITY AND ADLS: ICD-10-CM

## 2021-02-09 DIAGNOSIS — M25.511 CHRONIC RIGHT SHOULDER PAIN: ICD-10-CM

## 2021-02-09 PROCEDURE — 97110 THERAPEUTIC EXERCISES: CPT | Performed by: PHYSICAL THERAPIST

## 2021-02-09 PROCEDURE — 97140 MANUAL THERAPY 1/> REGIONS: CPT | Performed by: PHYSICAL THERAPIST

## 2021-02-09 NOTE — PROGRESS NOTES
Physical Therapy Treatment Note    Patient: Shant Arevalo                                                                                     Today's Date: 2021  :     1960    Referring practitioner:    Minh Mast MD  Date of Initial Visit:          Type: THERAPY  Noted: 2020    Patient seen for 10 sessions    Visit Diagnoses:    ICD-10-CM ICD-9-CM   1. S/P right rotator cuff repair  Z98.890 V45.89   2. Impaired mobility and ADLs  Z74.09 V49.89    Z78.9    3. Chronic right shoulder pain  M25.511 719.41    G89.29 338.29     SUBJECTIVE     Subjective Pt denies pain at this time or since last visit but can still feel a couple knots in there.     PAIN: pre and post 0/10       OBJECTIVE     Objective     Manual Therapy     44280  Comments   IASTM w/ Esmee tool to R anterior deltoid, biceps, and pec minor     R GH posterior/inferior mobilizations     Prone thoracic extension mobilizations             Timed Minutes 30     Therapeutic Exercises    71557 Comments   Prone scapular retractions 2x10   Prone Ts  2x10   Half-bolster pec stretch    R ER to neutral in SL  2x10    SA wall pushups 2x10   R UE flexion w/ eccentric lowering of 2# 1x5   R UE abduction w/ eccentric lowering of 2#  1x5       Timed Minutes 13          Therapy Education/Self Care 15923   Details:    Medbridge Code:    Given symptom relief   Progress: Reinforced   Who provided to: Patient   Level of understanding Verbalized   Timed Minutes      Total Timed Treatment:     43   mins  Total Time of Visit:            43   mins         ASSESSMENT/PLAN     GOALS  Goals   STG by: 21 Comments Status   Pt will demonstrate equal shoulder flexion (B) with pain no greater than 1-2/10.   Ongoing   Pt will be able to dress with pain no greater than 1-2/10   Ongoing           LTG by: 21       Pt will be independent with HEP including cuff and UE strengthening   Ongoing   Pt will demonstrate 4/5 or greater RC strength  demonstrated R scapular  weakness compared to L   Ongoing    Pt will report being able to return to ADLs with pain no greater than 1-2/10  Ongoing   Pt will score 10 or less on the Quick DASH.   Ongoing         Assessment/Plan     ASSESSMENT: We progressed postural strengthening and stretching and he responded well. He denies pain w/ exercises but does feel his knots moving around. He demonstrated decrease AROM compared to AA or PROM due to weakness.     PLAN: Continue to work on increasing his R shoulder AROM and strength.     Signature: Autumn Wiseman, PTA

## 2021-03-10 ENCOUNTER — TELEPHONE (OUTPATIENT)
Dept: UROLOGY | Facility: CLINIC | Age: 61
End: 2021-03-10

## 2021-03-10 NOTE — TELEPHONE ENCOUNTER
----- Message from CHADWICK Paige sent at 3/10/2021  4:11 PM CST -----  Regarding: FW: Testosterone Injections  You let patient know I really only treat now with a compounded or testosterone gel I also do not recommend or treat with IM injections.  Patient is welcome to go to the [facility] or I know [facility] does treat testosterone with injections also.  ----- Message -----  From: Jovani Hawk MA  Sent: 3/10/2021   4:07 PM CST  To: CHADWICK Paige  Subject: Testosterone Injections                          Pt called to get a refill of his testosterone 200mg injections. I looked in his chart and I didn't see where we were treating him for hypogonadism. Pt said that we did were, although I didn't see it in the note nor had we ordered it.  I told him that you nor Dr. Begum do not do injections. He said he gives them himself. Please advise?

## 2021-04-01 NOTE — PROGRESS NOTES
Subjective    Mr. Arevalo is 60 y.o. male    Chief Complaint: BPH with obstruction/lower urinary tract symptoms    History of Present Illness  Patient I saw 01/05/2021 for follow-up on BPH was found to have microscopic hematuria.  Today is a 3-month follow-up on microscopic hematuria and BPH.  He was taking 800 mg ibuprofen 3 times daily which he had been taking status post shoulder surgery.  He is no longer taking ibuprofen urine was rechecked today which is clear.    Benign Prostatic Hypertrophy  Patient complains of lower urinary tract symptoms. He reports frequency, incomplete emptying, intermittency, straining, urgency and weak stream. He denies nocturia. Patient states symptoms are of moderate severity. Onset of symptoms was several years ago and was gradual in onset. His AUA Symptom Score is, 21/35.He reports a history of no complicating symptoms. He denies flank pain, gross hematuria, kidney stones and recurrent UTI.  Patient has tried Alpha blockers with improvement. Last PSA was 1.9 done 09/2020 .        The following portions of the patient's history were reviewed and updated as appropriate: allergies, current medications, past family history, past medical history, past social history, past surgical history and problem list.    Review of Systems   Constitutional: Negative for appetite change and fever.   HENT: Negative for hearing loss and sore throat.    Eyes: Negative for pain and redness.   Respiratory: Negative for cough and shortness of breath.    Cardiovascular: Negative for chest pain and leg swelling.   Gastrointestinal: Negative for anal bleeding, nausea and vomiting.   Endocrine: Negative for cold intolerance and heat intolerance.   Genitourinary: Positive for frequency. Negative for dysuria, flank pain, hematuria and urgency.   Musculoskeletal: Negative for joint swelling and myalgias.   Skin: Negative for color change and rash.   Allergic/Immunologic: Negative for immunocompromised state.  "  Neurological: Negative for dizziness and speech difficulty.   Hematological: Negative for adenopathy. Does not bruise/bleed easily.   Psychiatric/Behavioral: Negative for dysphoric mood and suicidal ideas.         Current Outpatient Medications:   •  azelastine (ASTELIN) 0.1 % nasal spray, 2 sprays into each nostril 2 (Two) Times a Day. Use in each nostril as directed, Disp: 30 mL, Rfl: 3  •  B-D 3CC LUER-EMIL SYR 44XE4-0/2 22G X 1-1/2\" 3 ML misc, See Admin Instructions., Disp: , Rfl:   •  B-D HYPODERMIC NEEDLE 18GX1.5\" 18G X 1-1/2\" misc, USE 1 ONCE A WEEK WITH TESTOSTERONE, Disp: , Rfl:   •  busPIRone (BUSPAR) 5 MG tablet, Take 5 mg by mouth 2 (Two) Times a Day., Disp: , Rfl:   •  cetirizine (zyrTEC) 10 MG tablet, Take 10 mg by mouth Daily., Disp: , Rfl:   •  FLUoxetine (PROzac) 10 MG capsule, TAKE 1 CAPSULE BY MOUTH ONCE DAILY FOR 30 DAYS, Disp: , Rfl:   •  fluticasone (FLONASE) 50 MCG/ACT nasal spray, 1 spray into each nostril 2 (Two) Times a Day., Disp: 16 g, Rfl: 3  •  ibuprofen (ADVIL,MOTRIN) 800 MG tablet, Take 800 mg by mouth 3 (Three) Times a Day With Meals. NULL, Disp: , Rfl:   •  Iron-Vitamins (GERITOL PO), Take  by mouth., Disp: , Rfl:   •  promethazine (PHENERGAN) 25 MG tablet, Take 1 tablet by mouth Every 6 (Six) Hours As Needed for Nausea or Vomiting., Disp: 20 tablet, Rfl: 0  •  Pseudoephedrine-DM-GG (PSEUDO-GUAIFEN-DEX PO), Take  by mouth Daily., Disp: , Rfl:   •  QUEtiapine fumarate ER (SEROquel XR) 50 MG tablet sustained-release 24 hour tablet, TAKE 1 TABLET BY MOUTH ONCE DAILY IN THE EVENING FOR 30 DAYS, Disp: , Rfl:   •  tamsulosin (FLOMAX) 0.4 MG capsule 24 hr capsule, Take 1 capsule by mouth Every Night for 360 days., Disp: 30 capsule, Rfl: 11  •  Testosterone Cypionate 200 MG/ML kit, Inject 0.75 mL into the shoulder, thigh, or buttocks 1 (One) Time Per Week., Disp: 9 kit, Rfl: 0  •  tamsulosin (FLOMAX) 0.4 MG capsule 24 hr capsule, Use two 0.4 mg daily., Disp: 60 capsule, Rfl: 11    Past " "Medical History:   Diagnosis Date   • Allergic    • Anxiety 8/24/2017   • Hyperlipidemia    • Hypogonadism in male        Past Surgical History:   Procedure Laterality Date   • SINUS SURGERY      x5       Social History     Socioeconomic History   • Marital status:      Spouse name: Not on file   • Number of children: Not on file   • Years of education: Not on file   • Highest education level: Not on file   Tobacco Use   • Smoking status: Never Smoker   • Smokeless tobacco: Never Used   Vaping Use   • Vaping Use: Never used   Substance and Sexual Activity   • Alcohol use: No   • Drug use: No   • Sexual activity: Defer     Partners: Female       Family History   Problem Relation Age of Onset   • Heart disease Mother    • Cancer Mother         breast   • Breast cancer Mother    • Diabetes Mother    • No Known Problems Father    • No Known Problems Brother    • Colon cancer Neg Hx    • Prostate cancer Neg Hx    • Alcohol abuse Neg Hx    • Drug abuse Neg Hx    • Thyroid cancer Neg Hx    • Thyroid disease Neg Hx        Objective    Temp 97 °F (36.1 °C) (Temporal)   Ht 182.9 cm (72\")   Wt 93.9 kg (207 lb)   BMI 28.07 kg/m²     Physical Exam  Vitals reviewed.   Constitutional:       Appearance: Normal appearance.   HENT:      Head: Normocephalic and atraumatic.      Right Ear: External ear normal.      Left Ear: External ear normal.      Nose: No congestion.   Eyes:      Conjunctiva/sclera: Conjunctivae normal.   Neck:      Comments: I observed no obvious neck masses  Pulmonary:      Effort: Pulmonary effort is normal.   Musculoskeletal:      Comments: Patient ambulates without difficulty   Skin:     Coloration: Skin is not pale.      Findings: No rash.      Comments: No obvious facial rash noted   Neurological:      General: No focal deficit present.      Mental Status: He is alert and oriented to person, place, and time.   Psychiatric:         Mood and Affect: Mood normal.         Behavior: Behavior normal. "             Results for orders placed or performed in visit on 04/05/21   POC Urinalysis Dipstick, Multipro    Specimen: Urine   Result Value Ref Range    Color Yellow Yellow, Straw, Dark Yellow, Areli    Clarity, UA Clear Clear    Glucose, UA Negative Negative, 1000 mg/dL (3+) mg/dL    Bilirubin Negative Negative    Ketones, UA Trace (A) Negative    Specific Gravity  1.025 1.005 - 1.030    Blood, UA Negative Negative    pH, Urine 6.0 5.0 - 8.0    Protein, POC 30 mg/dL (A) Negative mg/dL    Urobilinogen, UA Normal Normal    Nitrite, UA Negative Negative    Leukocytes Negative Negative     Assessment and Plan    Diagnoses and all orders for this visit:    1. BPH with obstruction/lower urinary tract symptoms (Primary)  -     POC Urinalysis Dipstick, Multipro  -     tamsulosin (FLOMAX) 0.4 MG capsule 24 hr capsule; Use two 0.4 mg daily.  Dispense: 60 capsule; Refill: 11    2. Microscopic hematuria    Regarding his microscopic hematuria his urine is negative for hematuria today most likely related to being on ibuprofen 80 mg when his urine was last checked at this point do not recommend formal hematuria evaluation.    Regarding his BPH symptoms patient states he has reasonable symptoms in the daytime but as the day goes on symptoms worsen such as weaker stream dribbling.  After discussion we will have him take 2 tamsulosin daily to see if this can get him better symptom control and follow-up in 6 months.

## 2021-04-05 ENCOUNTER — OFFICE VISIT (OUTPATIENT)
Dept: UROLOGY | Facility: CLINIC | Age: 61
End: 2021-04-05

## 2021-04-05 VITALS — WEIGHT: 207 LBS | TEMPERATURE: 97 F | HEIGHT: 72 IN | BODY MASS INDEX: 28.04 KG/M2

## 2021-04-05 DIAGNOSIS — N40.1 BPH WITH OBSTRUCTION/LOWER URINARY TRACT SYMPTOMS: Primary | ICD-10-CM

## 2021-04-05 DIAGNOSIS — N13.8 BPH WITH OBSTRUCTION/LOWER URINARY TRACT SYMPTOMS: Primary | ICD-10-CM

## 2021-04-05 DIAGNOSIS — R31.29 MICROSCOPIC HEMATURIA: ICD-10-CM

## 2021-04-05 LAB
BILIRUB BLD-MCNC: NEGATIVE MG/DL
CLARITY, POC: CLEAR
COLOR UR: YELLOW
GLUCOSE UR STRIP-MCNC: NEGATIVE MG/DL
KETONES UR QL: ABNORMAL
LEUKOCYTE EST, POC: NEGATIVE
NITRITE UR-MCNC: NEGATIVE MG/ML
PH UR: 6 [PH] (ref 5–8)
PROT UR STRIP-MCNC: ABNORMAL MG/DL
RBC # UR STRIP: NEGATIVE /UL
SP GR UR: 1.02 (ref 1–1.03)
UROBILINOGEN UR QL: NORMAL

## 2021-04-05 PROCEDURE — 81003 URINALYSIS AUTO W/O SCOPE: CPT | Performed by: PHYSICIAN ASSISTANT

## 2021-04-05 PROCEDURE — 99213 OFFICE O/P EST LOW 20 MIN: CPT | Performed by: PHYSICIAN ASSISTANT

## 2021-04-05 RX ORDER — SYRINGE WITH NEEDLE, 1 ML 25GX5/8"
SYRINGE, EMPTY DISPOSABLE MISCELLANEOUS SEE ADMIN INSTRUCTIONS
Status: ON HOLD | COMMUNITY
Start: 2021-02-27 | End: 2022-01-03

## 2021-04-05 RX ORDER — TAMSULOSIN HYDROCHLORIDE 0.4 MG/1
CAPSULE ORAL
Qty: 60 CAPSULE | Refills: 11 | Status: ON HOLD | OUTPATIENT
Start: 2021-04-05 | End: 2022-01-03

## 2021-10-01 NOTE — PROGRESS NOTES
"Subjective    Mr. Arevalo is 61 y.o. male    Chief Complaint: 6 month follow up for BPH with LUTS    History of Present Illness  Benign Prostatic Hypertrophy  Patient complains of lower urinary tract symptoms. He reports frequency, incomplete emptying, intermittency, nocturia three times a night, straining, urgency and weak stream. He denies nothing. Patient states symptoms are of severe severity. Onset of symptoms was a few years ago and was gradual in onset. His AUA Symptom Score is, 32/35.He reports a history of no complicating symptoms. He denies flank pain, gross hematuria, kidney stones and recurrent UTI.  Patient has tried Alpha blockers without improvement. Last PSA was 1.3 .        The following portions of the patient's history were reviewed and updated as appropriate: allergies, current medications, past family history, past medical history, past social history, past surgical history and problem list.    Review of Systems   Constitutional: Negative for chills and fever.   HENT: Negative.    Eyes: Negative.    Respiratory: Negative for shortness of breath.    Cardiovascular: Negative for chest pain.   Gastrointestinal: Negative for abdominal pain, anal bleeding, blood in stool, nausea and vomiting.   Endocrine: Negative.    Genitourinary: Positive for difficulty urinating. Negative for dysuria, frequency, hematuria and urgency.        Weak stream   Hard time getting the flow started   Musculoskeletal: Negative.    Skin: Negative.    Allergic/Immunologic: Negative.    Neurological: Negative.    Hematological: Negative.    Psychiatric/Behavioral: Negative.          Current Outpatient Medications:   •  azelastine (ASTELIN) 0.1 % nasal spray, 2 sprays into each nostril 2 (Two) Times a Day. Use in each nostril as directed, Disp: 30 mL, Rfl: 3  •  B-D 3CC LUER-EMIL SYR 24QP0-7/2 22G X 1-1/2\" 3 ML misc, See Admin Instructions., Disp: , Rfl:   •  B-D HYPODERMIC NEEDLE 18GX1.5\" 18G X 1-1/2\" misc, USE 1 ONCE A WEEK " WITH TESTOSTERONE, Disp: , Rfl:   •  cetirizine (zyrTEC) 10 MG tablet, Take 10 mg by mouth Daily., Disp: , Rfl:   •  fluticasone (FLONASE) 50 MCG/ACT nasal spray, 1 spray into each nostril 2 (Two) Times a Day., Disp: 16 g, Rfl: 3  •  Iron-Vitamins (GERITOL PO), Take  by mouth., Disp: , Rfl:   •  Pseudoephedrine-DM-GG (PSEUDO-GUAIFEN-DEX PO), Take  by mouth Daily., Disp: , Rfl:   •  Testosterone Cypionate 200 MG/ML kit, Inject 0.75 mL into the shoulder, thigh, or buttocks 1 (One) Time Per Week., Disp: 9 kit, Rfl: 0  •  busPIRone (BUSPAR) 5 MG tablet, Take 5 mg by mouth 2 (Two) Times a Day., Disp: , Rfl:   •  FLUoxetine (PROzac) 10 MG capsule, TAKE 1 CAPSULE BY MOUTH ONCE DAILY FOR 30 DAYS, Disp: , Rfl:   •  ibuprofen (ADVIL,MOTRIN) 800 MG tablet, Take 800 mg by mouth 3 (Three) Times a Day With Meals. NULL, Disp: , Rfl:   •  promethazine (PHENERGAN) 25 MG tablet, Take 1 tablet by mouth Every 6 (Six) Hours As Needed for Nausea or Vomiting., Disp: 20 tablet, Rfl: 0  •  QUEtiapine fumarate ER (SEROquel XR) 50 MG tablet sustained-release 24 hour tablet, TAKE 1 TABLET BY MOUTH ONCE DAILY IN THE EVENING FOR 30 DAYS, Disp: , Rfl:   •  tamsulosin (FLOMAX) 0.4 MG capsule 24 hr capsule, Take 1 capsule by mouth Every Night for 360 days., Disp: 30 capsule, Rfl: 11  •  tamsulosin (FLOMAX) 0.4 MG capsule 24 hr capsule, Use two 0.4 mg daily., Disp: 60 capsule, Rfl: 11    Past Medical History:   Diagnosis Date   • Allergic    • Anxiety 8/24/2017   • Hyperlipidemia    • Hypogonadism in male        Past Surgical History:   Procedure Laterality Date   • SHOULDER ACROMIOPLASTY WITH ROTATOR CUFF REPAIR     • SINUS SURGERY      x5       Social History     Socioeconomic History   • Marital status:      Spouse name: Not on file   • Number of children: Not on file   • Years of education: Not on file   • Highest education level: Not on file   Tobacco Use   • Smoking status: Never Smoker   • Smokeless tobacco: Never Used   Vaping Use   •  "Vaping Use: Never used   Substance and Sexual Activity   • Alcohol use: No   • Drug use: No   • Sexual activity: Defer     Partners: Female       Family History   Problem Relation Age of Onset   • Heart disease Mother    • Cancer Mother         breast   • Breast cancer Mother    • Diabetes Mother    • No Known Problems Father    • No Known Problems Brother    • Colon cancer Neg Hx    • Prostate cancer Neg Hx    • Alcohol abuse Neg Hx    • Drug abuse Neg Hx    • Thyroid cancer Neg Hx    • Thyroid disease Neg Hx        Objective    Temp 97.9 °F (36.6 °C)   Ht 182.9 cm (72\")   Wt 90 kg (198 lb 6.4 oz)   BMI 26.91 kg/m²     Physical Exam  Vitals reviewed.   Constitutional:       Appearance: Normal appearance.   HENT:      Head: Normocephalic and atraumatic.      Right Ear: External ear normal.      Left Ear: External ear normal.   Pulmonary:      Effort: Pulmonary effort is normal.   Neurological:      General: No focal deficit present.      Mental Status: He is alert and oriented to person, place, and time.   Psychiatric:         Mood and Affect: Mood normal.         Behavior: Behavior normal.             Results for orders placed or performed in visit on 10/04/21   POC Urinalysis Dipstick, Multipro    Specimen: Urine   Result Value Ref Range    Color Yellow Yellow, Straw, Dark Yellow, Areli    Clarity, UA Clear Clear    Glucose, UA Negative Negative, 1000 mg/dL (3+) mg/dL    Bilirubin Negative Negative    Ketones, UA Negative Negative    Specific Gravity  1.025 1.005 - 1.030    Blood, UA Negative Negative    pH, Urine 5.5 5.0 - 8.0    Protein, POC Trace (A) Negative mg/dL    Urobilinogen, UA Normal Normal    Nitrite, UA Negative Negative    Leukocytes Negative Negative     International Prostate Symptom Score  The following is posted based on patient questionnaire answers:  0 - not at all    1-7 mild symptoms  1- Less than one time in five  8-19 moderate symptoms  2 -Less than half the time  20-35 severe " symptoms  3 - About half the time  4 - More than half the time  5 - Almost always     For following sections:  Incomplete Emptying: - How often have you had the sensation  of not emptying your bladder completely after you finished urinating?  5  Frequency: -How often have you had to urinate again less than   two hours after you finished urinating?      4  Intermittency: -How often have you found you stopped and started again  Several times when you urinate?       5  Urgency: -How often do you find it difficult to postpone urination?             5  Weak stream: - How often have you had a weak urinary stream?             5  Straining: - How often have you had to push or strain to begin  Urination?          5  Sleeping: -How many times did you most typically get up to urinate   From the time you went to bed at night until the time you got up in the   3  Morning          Total `  32    Quality of Life  How would you feel if you had to live with your urinary condition the way   5  It is now, no better, no worse for the rest of your life?    Where: 0=delighted; 1= pleased, 2= mostly satisfied, 3= mixed, 4 = mostly  Dissatisfied, 5= Unhappy, 6 = terrible    Bladder Scan interpretation  Estimation of residual urine via abdominal ultrasound  Residual Urine: 6 ml  Indication: BPH  Position: Supine  Examination: Incremental scanning of the suprapubic area using 3 MHz transducer using copious amounts of acoustic gel.   Findings: An anechoic area was demonstrated which represented the bladder, with measurement of residual urine as noted. I inspected this myself. In that the residual urine was stable or insignificant, no treatment will be necessary at this time.       Assessment and Plan    Diagnoses and all orders for this visit:    1. BPH with obstruction/lower urinary tract symptoms (Primary)  -     POC Urinalysis Dipstick, Multipro    Patient gradually worsening voiding complaints and symptoms he was on 2 tamsulosin daily  which I started at his last visit he had no appreciable change she has a weak flow hard time getting started and hard time finishing.  Due to the fact he had no reachable change or response to alpha-blocker like to schedule him for cystoscopy to evaluate his lower urinary tract.

## 2021-10-04 ENCOUNTER — OFFICE VISIT (OUTPATIENT)
Dept: UROLOGY | Facility: CLINIC | Age: 61
End: 2021-10-04

## 2021-10-04 VITALS — TEMPERATURE: 97.9 F | BODY MASS INDEX: 26.87 KG/M2 | HEIGHT: 72 IN | WEIGHT: 198.4 LBS

## 2021-10-04 DIAGNOSIS — N40.1 BPH WITH OBSTRUCTION/LOWER URINARY TRACT SYMPTOMS: Primary | ICD-10-CM

## 2021-10-04 DIAGNOSIS — N13.8 BPH WITH OBSTRUCTION/LOWER URINARY TRACT SYMPTOMS: Primary | ICD-10-CM

## 2021-10-04 LAB
BILIRUB BLD-MCNC: NEGATIVE MG/DL
CLARITY, POC: CLEAR
COLOR UR: YELLOW
GLUCOSE UR STRIP-MCNC: NEGATIVE MG/DL
KETONES UR QL: NEGATIVE
LEUKOCYTE EST, POC: NEGATIVE
NITRITE UR-MCNC: NEGATIVE MG/ML
PH UR: 5.5 [PH] (ref 5–8)
PROT UR STRIP-MCNC: ABNORMAL MG/DL
RBC # UR STRIP: NEGATIVE /UL
SP GR UR: 1.02 (ref 1–1.03)
UROBILINOGEN UR QL: NORMAL

## 2021-10-04 PROCEDURE — 81001 URINALYSIS AUTO W/SCOPE: CPT | Performed by: PHYSICIAN ASSISTANT

## 2021-10-04 PROCEDURE — 51798 US URINE CAPACITY MEASURE: CPT | Performed by: PHYSICIAN ASSISTANT

## 2021-10-04 PROCEDURE — 99213 OFFICE O/P EST LOW 20 MIN: CPT | Performed by: PHYSICIAN ASSISTANT

## 2021-10-13 NOTE — PROGRESS NOTES
CC: I am here for the doctor to look at my bladder    Last seen by me November 2020 for BPH.  At that time we restarted his tamsulosin.  Worsening of voiding symptoms.  Recently seen by Minh Burgess PA-C a couple times.  Increase his tamsulosin 0.8 mg which did not help.  Therefore he stopped the tamsulosin.      Cystoscopy procedure note  Pre- operative diagnosis:  Benign prostatic hypertrophy    Post operative diagnosis:  Same    Procedure:  The patient was prepped and draped in a normal sterile fashion.  The urethra was anesthetized with 2% lidocaine jelly.  A well lubricated flexible cystoscope was introduced per urethra.  The anterior urethra is normal in its caliber without evidence of a mass.  There appears to be no contracture at the bladder neck.  The prostatic urethra does not appear obstructed.  He has a little bit of lateral lobe enlargement may be slightly elevated bladder neck.  The bladder itself is mildly trabeculated at most.  This does not appear to be a bladder outlet obstructive lower urinary tract..  The ureteral orifces are essentially normal in locationn and there is clear efflux of urine.    Patient tolerated the procedure well    Complications: none    Blood loss: minimal    Diagnoses and all orders for this visit:    1. BPH with obstruction/lower urinary tract symptoms (Primary)  -     POC Urinalysis Dipstick, Multipro    2. Urgency of urination  -     trospium (SANCTURA) 20 MG tablet; Take 1 tablet by mouth 2 (Two) Times a Day Before Meals.  Dispense: 180 tablet; Refill: 3      Options discussed.  He does not appear obstructed on cystoscopy.  I think he needs to be on an overactive bladder medication.  He thinks he is already tried mirabegron.  I am going to put him on trospium.  I told him to take this for at least 90 days before we decide how well he is doing.  I would like him to see Minh Burgess PA-C after this.    Assessment/Plan              Follow up:    Routine follow up

## 2021-10-14 ENCOUNTER — PROCEDURE VISIT (OUTPATIENT)
Dept: UROLOGY | Facility: CLINIC | Age: 61
End: 2021-10-14

## 2021-10-14 DIAGNOSIS — N13.8 BPH WITH OBSTRUCTION/LOWER URINARY TRACT SYMPTOMS: Primary | ICD-10-CM

## 2021-10-14 DIAGNOSIS — N40.1 BPH WITH OBSTRUCTION/LOWER URINARY TRACT SYMPTOMS: Primary | ICD-10-CM

## 2021-10-14 DIAGNOSIS — R39.15 URGENCY OF URINATION: ICD-10-CM

## 2021-10-14 LAB
BILIRUB BLD-MCNC: NEGATIVE MG/DL
CLARITY, POC: CLEAR
COLOR UR: YELLOW
GLUCOSE UR STRIP-MCNC: ABNORMAL MG/DL
KETONES UR QL: NEGATIVE
LEUKOCYTE EST, POC: NEGATIVE
NITRITE UR-MCNC: NEGATIVE MG/ML
PH UR: 5.5 [PH] (ref 5–8)
PROT UR STRIP-MCNC: NEGATIVE MG/DL
RBC # UR STRIP: ABNORMAL /UL
SP GR UR: 1.02 (ref 1–1.03)
UROBILINOGEN UR QL: NORMAL

## 2021-10-14 PROCEDURE — 52000 CYSTOURETHROSCOPY: CPT | Performed by: UROLOGY

## 2021-10-14 PROCEDURE — 81003 URINALYSIS AUTO W/O SCOPE: CPT | Performed by: UROLOGY

## 2021-10-14 RX ORDER — TROSPIUM CHLORIDE 20 MG/1
20 TABLET, FILM COATED ORAL
Qty: 180 TABLET | Refills: 3 | Status: SHIPPED | OUTPATIENT
Start: 2021-10-14 | End: 2022-01-17

## 2022-01-03 ENCOUNTER — APPOINTMENT (OUTPATIENT)
Dept: CT IMAGING | Facility: HOSPITAL | Age: 62
End: 2022-01-03

## 2022-01-03 ENCOUNTER — HOSPITAL ENCOUNTER (INPATIENT)
Facility: HOSPITAL | Age: 62
LOS: 7 days | Discharge: HOME | End: 2022-01-10
Attending: EMERGENCY MEDICINE | Admitting: INTERNAL MEDICINE

## 2022-01-03 ENCOUNTER — APPOINTMENT (OUTPATIENT)
Dept: GENERAL RADIOLOGY | Facility: HOSPITAL | Age: 62
End: 2022-01-03

## 2022-01-03 DIAGNOSIS — N13.8 BPH WITH OBSTRUCTION/LOWER URINARY TRACT SYMPTOMS: ICD-10-CM

## 2022-01-03 DIAGNOSIS — J12.82 PNEUMONIA DUE TO COVID-19 VIRUS: Primary | ICD-10-CM

## 2022-01-03 DIAGNOSIS — R09.02 HYPOXIA: ICD-10-CM

## 2022-01-03 DIAGNOSIS — U07.1 PNEUMONIA DUE TO COVID-19 VIRUS: Primary | ICD-10-CM

## 2022-01-03 DIAGNOSIS — N40.1 BPH WITH OBSTRUCTION/LOWER URINARY TRACT SYMPTOMS: ICD-10-CM

## 2022-01-03 PROBLEM — J96.01 ACUTE RESPIRATORY FAILURE WITH HYPOXIA: Status: ACTIVE | Noted: 2022-01-03

## 2022-01-03 PROBLEM — J01.01 ACUTE RECURRENT MAXILLARY SINUSITIS: Status: RESOLVED | Noted: 2017-06-05 | Resolved: 2022-01-03

## 2022-01-03 PROBLEM — J01.41 ACUTE RECURRENT PANSINUSITIS: Status: RESOLVED | Noted: 2017-12-04 | Resolved: 2022-01-03

## 2022-01-03 LAB
ALBUMIN SERPL-MCNC: 3.7 G/DL (ref 3.5–5.2)
ALBUMIN/GLOB SERPL: 1.2 G/DL
ALP SERPL-CCNC: 137 U/L (ref 39–117)
ALT SERPL W P-5'-P-CCNC: 167 U/L (ref 1–41)
ANION GAP SERPL CALCULATED.3IONS-SCNC: 11 MMOL/L (ref 5–15)
ARTERIAL PATENCY WRIST A: POSITIVE
AST SERPL-CCNC: 94 U/L (ref 1–40)
ATMOSPHERIC PRESS: 762 MMHG
BACTERIA UR QL AUTO: ABNORMAL /HPF
BASE EXCESS BLDA CALC-SCNC: 3.7 MMOL/L (ref 0–2)
BDY SITE: ABNORMAL
BILIRUB SERPL-MCNC: 0.8 MG/DL (ref 0–1.2)
BILIRUB UR QL STRIP: ABNORMAL
BODY TEMPERATURE: 37 C
BUN SERPL-MCNC: 20 MG/DL (ref 6–20)
BUN/CREAT SERPL: 13.5 (ref 7–25)
CALCIUM SPEC-SCNC: 8.5 MG/DL (ref 8.6–10.5)
CHLORIDE SERPL-SCNC: 99 MMOL/L (ref 98–107)
CLARITY UR: CLEAR
CO2 SERPL-SCNC: 29 MMOL/L (ref 22–29)
COLOR UR: ABNORMAL
CREAT SERPL-MCNC: 1.48 MG/DL (ref 0.76–1.27)
CRP SERPL-MCNC: 8.14 MG/DL (ref 0–0.5)
D DIMER PPP FEU-MCNC: 1.54 MG/L (FEU) (ref 0–0.5)
D-LACTATE SERPL-SCNC: 1.6 MMOL/L (ref 0.5–2)
DEPRECATED RDW RBC AUTO: 45.2 FL (ref 37–54)
ERYTHROCYTE [DISTWIDTH] IN BLOOD BY AUTOMATED COUNT: 13.2 % (ref 12.3–15.4)
FERRITIN SERPL-MCNC: 652.4 NG/ML (ref 30–400)
FLUAV RNA RESP QL NAA+PROBE: NOT DETECTED
FLUBV RNA RESP QL NAA+PROBE: NOT DETECTED
GAS FLOW AIRWAY: 2 LPM
GFR SERPL CREATININE-BSD FRML MDRD: 49 ML/MIN/1.73
GLOBULIN UR ELPH-MCNC: 3.2 GM/DL
GLUCOSE SERPL-MCNC: 153 MG/DL (ref 65–99)
GLUCOSE UR STRIP-MCNC: NEGATIVE MG/DL
HCO3 BLDA-SCNC: 26.9 MMOL/L (ref 20–26)
HCT VFR BLD AUTO: 48.4 % (ref 37.5–51)
HGB BLD-MCNC: 16 G/DL (ref 13–17.7)
HGB UR QL STRIP.AUTO: NEGATIVE
HYALINE CASTS UR QL AUTO: ABNORMAL /LPF
INR PPP: 1 (ref 0.91–1.09)
KETONES UR QL STRIP: ABNORMAL
L PNEUMO1 AG UR QL IA: NEGATIVE
LEUKOCYTE ESTERASE UR QL STRIP.AUTO: ABNORMAL
LYMPHOCYTES # BLD MANUAL: 0.4 10*3/MM3 (ref 0.7–3.1)
LYMPHOCYTES NFR BLD MANUAL: 4 % (ref 5–12)
Lab: ABNORMAL
Lab: ABNORMAL
MCH RBC QN AUTO: 30.4 PG (ref 26.6–33)
MCHC RBC AUTO-ENTMCNC: 33.1 G/DL (ref 31.5–35.7)
MCV RBC AUTO: 92 FL (ref 79–97)
MODALITY: ABNORMAL
MONOCYTES # BLD: 0.27 10*3/MM3 (ref 0.1–0.9)
MRSA DNA SPEC QL NAA+PROBE: ABNORMAL
NEUTROPHILS # BLD AUTO: 6.03 10*3/MM3 (ref 1.7–7)
NEUTROPHILS NFR BLD MANUAL: 90 % (ref 42.7–76)
NITRITE UR QL STRIP: NEGATIVE
NOTIFIED BY: ABNORMAL
NOTIFIED WHO: ABNORMAL
PCO2 BLDA: 35.6 MM HG (ref 35–45)
PCO2 TEMP ADJ BLD: 35.6 MM HG (ref 35–45)
PH BLDA: 7.49 PH UNITS (ref 7.35–7.45)
PH UR STRIP.AUTO: 6 [PH] (ref 5–8)
PH, TEMP CORRECTED: 7.49 PH UNITS (ref 7.35–7.45)
PLAT MORPH BLD: NORMAL
PLATELET # BLD AUTO: 187 10*3/MM3 (ref 140–450)
PMV BLD AUTO: 9.8 FL (ref 6–12)
PO2 BLDA: 54 MM HG (ref 83–108)
PO2 TEMP ADJ BLD: 54 MM HG (ref 83–108)
POTASSIUM SERPL-SCNC: 4.1 MMOL/L (ref 3.5–5.2)
PROCALCITONIN SERPL-MCNC: 0.16 NG/ML (ref 0–0.25)
PROCALCITONIN SERPL-MCNC: 0.18 NG/ML (ref 0–0.25)
PROT SERPL-MCNC: 6.9 G/DL (ref 6–8.5)
PROT UR QL STRIP: ABNORMAL
PROTHROMBIN TIME: 12.8 SECONDS (ref 11.9–14.6)
RBC # BLD AUTO: 5.26 10*6/MM3 (ref 4.14–5.8)
RBC # UR STRIP: ABNORMAL /HPF
RBC MORPH BLD: NORMAL
REF LAB TEST METHOD: ABNORMAL
S PNEUM AG SPEC QL LA: NEGATIVE
SAO2 % BLDCOA: 91.1 % (ref 94–99)
SARS-COV-2 RNA RESP QL NAA+PROBE: DETECTED
SODIUM SERPL-SCNC: 139 MMOL/L (ref 136–145)
SP GR UR STRIP: >1.03 (ref 1–1.03)
SQUAMOUS #/AREA URNS HPF: ABNORMAL /HPF
UROBILINOGEN UR QL STRIP: ABNORMAL
VARIANT LYMPHS NFR BLD MANUAL: 2 % (ref 0–5)
VARIANT LYMPHS NFR BLD MANUAL: 4 % (ref 19.6–45.3)
VENTILATOR MODE: ABNORMAL
WBC # UR STRIP: ABNORMAL /HPF
WBC MORPH BLD: NORMAL
WBC NRBC COR # BLD: 6.7 10*3/MM3 (ref 3.4–10.8)

## 2022-01-03 PROCEDURE — 87899 AGENT NOS ASSAY W/OPTIC: CPT | Performed by: INTERNAL MEDICINE

## 2022-01-03 PROCEDURE — 82803 BLOOD GASES ANY COMBINATION: CPT

## 2022-01-03 PROCEDURE — 86140 C-REACTIVE PROTEIN: CPT | Performed by: EMERGENCY MEDICINE

## 2022-01-03 PROCEDURE — 36600 WITHDRAWAL OF ARTERIAL BLOOD: CPT

## 2022-01-03 PROCEDURE — 84145 PROCALCITONIN (PCT): CPT | Performed by: EMERGENCY MEDICINE

## 2022-01-03 PROCEDURE — 71045 X-RAY EXAM CHEST 1 VIEW: CPT

## 2022-01-03 PROCEDURE — 87040 BLOOD CULTURE FOR BACTERIA: CPT | Performed by: EMERGENCY MEDICINE

## 2022-01-03 PROCEDURE — 85007 BL SMEAR W/DIFF WBC COUNT: CPT | Performed by: EMERGENCY MEDICINE

## 2022-01-03 PROCEDURE — 36415 COLL VENOUS BLD VENIPUNCTURE: CPT

## 2022-01-03 PROCEDURE — 84145 PROCALCITONIN (PCT): CPT | Performed by: FAMILY MEDICINE

## 2022-01-03 PROCEDURE — 0 IOPAMIDOL PER 1 ML: Performed by: EMERGENCY MEDICINE

## 2022-01-03 PROCEDURE — 94799 UNLISTED PULMONARY SVC/PX: CPT

## 2022-01-03 PROCEDURE — 82728 ASSAY OF FERRITIN: CPT | Performed by: FAMILY MEDICINE

## 2022-01-03 PROCEDURE — 25010000002 ENOXAPARIN PER 10 MG: Performed by: FAMILY MEDICINE

## 2022-01-03 PROCEDURE — 85379 FIBRIN DEGRADATION QUANT: CPT | Performed by: EMERGENCY MEDICINE

## 2022-01-03 PROCEDURE — 87636 SARSCOV2 & INF A&B AMP PRB: CPT | Performed by: EMERGENCY MEDICINE

## 2022-01-03 PROCEDURE — 83605 ASSAY OF LACTIC ACID: CPT | Performed by: EMERGENCY MEDICINE

## 2022-01-03 PROCEDURE — 85025 COMPLETE CBC W/AUTO DIFF WBC: CPT | Performed by: EMERGENCY MEDICINE

## 2022-01-03 PROCEDURE — 71275 CT ANGIOGRAPHY CHEST: CPT

## 2022-01-03 PROCEDURE — 87086 URINE CULTURE/COLONY COUNT: CPT | Performed by: FAMILY MEDICINE

## 2022-01-03 PROCEDURE — 81001 URINALYSIS AUTO W/SCOPE: CPT | Performed by: FAMILY MEDICINE

## 2022-01-03 PROCEDURE — 87641 MR-STAPH DNA AMP PROBE: CPT | Performed by: INTERNAL MEDICINE

## 2022-01-03 PROCEDURE — 99284 EMERGENCY DEPT VISIT MOD MDM: CPT

## 2022-01-03 PROCEDURE — 85610 PROTHROMBIN TIME: CPT | Performed by: EMERGENCY MEDICINE

## 2022-01-03 PROCEDURE — 25010000002 AZITHROMYCIN PER 500 MG: Performed by: INTERNAL MEDICINE

## 2022-01-03 PROCEDURE — 63710000001 DEXAMETHASONE PER 0.25 MG: Performed by: EMERGENCY MEDICINE

## 2022-01-03 PROCEDURE — 25010000002 CEFTRIAXONE PER 250 MG: Performed by: INTERNAL MEDICINE

## 2022-01-03 PROCEDURE — 80053 COMPREHEN METABOLIC PANEL: CPT | Performed by: EMERGENCY MEDICINE

## 2022-01-03 RX ORDER — MELATONIN
1000 DAILY
Status: DISCONTINUED | OUTPATIENT
Start: 2022-01-03 | End: 2022-01-10 | Stop reason: HOSPADM

## 2022-01-03 RX ORDER — POLYETHYLENE GLYCOL 3350 17 G/17G
17 POWDER, FOR SOLUTION ORAL DAILY PRN
Status: DISCONTINUED | OUTPATIENT
Start: 2022-01-03 | End: 2022-01-10 | Stop reason: HOSPADM

## 2022-01-03 RX ORDER — ONDANSETRON 2 MG/ML
4 INJECTION INTRAMUSCULAR; INTRAVENOUS EVERY 6 HOURS PRN
Status: DISCONTINUED | OUTPATIENT
Start: 2022-01-03 | End: 2022-01-10 | Stop reason: HOSPADM

## 2022-01-03 RX ORDER — FLUTICASONE PROPIONATE 50 MCG
1 SPRAY, SUSPENSION (ML) NASAL 2 TIMES DAILY
Status: DISCONTINUED | OUTPATIENT
Start: 2022-01-03 | End: 2022-01-10 | Stop reason: HOSPADM

## 2022-01-03 RX ORDER — BISACODYL 10 MG
10 SUPPOSITORY, RECTAL RECTAL DAILY PRN
Status: DISCONTINUED | OUTPATIENT
Start: 2022-01-03 | End: 2022-01-10 | Stop reason: HOSPADM

## 2022-01-03 RX ORDER — ZINC SULFATE 50(220)MG
220 CAPSULE ORAL DAILY
Status: DISCONTINUED | OUTPATIENT
Start: 2022-01-03 | End: 2022-01-10 | Stop reason: HOSPADM

## 2022-01-03 RX ORDER — CETIRIZINE HYDROCHLORIDE 10 MG/1
10 TABLET ORAL DAILY
Status: DISCONTINUED | OUTPATIENT
Start: 2022-01-03 | End: 2022-01-10 | Stop reason: HOSPADM

## 2022-01-03 RX ORDER — SODIUM CHLORIDE 0.9 % (FLUSH) 0.9 %
10 SYRINGE (ML) INJECTION EVERY 12 HOURS SCHEDULED
Status: DISCONTINUED | OUTPATIENT
Start: 2022-01-03 | End: 2022-01-10 | Stop reason: HOSPADM

## 2022-01-03 RX ORDER — ATORVASTATIN CALCIUM 10 MG/1
20 TABLET, FILM COATED ORAL NIGHTLY
Status: DISCONTINUED | OUTPATIENT
Start: 2022-01-03 | End: 2022-01-03

## 2022-01-03 RX ORDER — FAMOTIDINE 20 MG/1
20 TABLET, FILM COATED ORAL
Status: DISCONTINUED | OUTPATIENT
Start: 2022-01-03 | End: 2022-01-10 | Stop reason: HOSPADM

## 2022-01-03 RX ORDER — BISACODYL 5 MG/1
5 TABLET, DELAYED RELEASE ORAL DAILY PRN
Status: DISCONTINUED | OUTPATIENT
Start: 2022-01-03 | End: 2022-01-10 | Stop reason: HOSPADM

## 2022-01-03 RX ORDER — ACETAMINOPHEN 500 MG
1000 TABLET ORAL ONCE
Status: COMPLETED | OUTPATIENT
Start: 2022-01-03 | End: 2022-01-03

## 2022-01-03 RX ORDER — FLUOXETINE 10 MG/1
10 CAPSULE ORAL DAILY
Status: DISCONTINUED | OUTPATIENT
Start: 2022-01-03 | End: 2022-01-10 | Stop reason: HOSPADM

## 2022-01-03 RX ORDER — BUSPIRONE HYDROCHLORIDE 5 MG/1
5 TABLET ORAL 2 TIMES DAILY
Status: DISCONTINUED | OUTPATIENT
Start: 2022-01-03 | End: 2022-01-10 | Stop reason: HOSPADM

## 2022-01-03 RX ORDER — ESZOPICLONE 2 MG/1
2 TABLET, FILM COATED ORAL NIGHTLY
COMMUNITY
End: 2022-01-17

## 2022-01-03 RX ORDER — ASCORBIC ACID 500 MG
1000 TABLET ORAL DAILY
Status: DISCONTINUED | OUTPATIENT
Start: 2022-01-03 | End: 2022-01-10 | Stop reason: HOSPADM

## 2022-01-03 RX ORDER — LANOLIN ALCOHOL/MO/W.PET/CERES
6 CREAM (GRAM) TOPICAL NIGHTLY
Status: DISCONTINUED | OUTPATIENT
Start: 2022-01-03 | End: 2022-01-10 | Stop reason: HOSPADM

## 2022-01-03 RX ORDER — SODIUM CHLORIDE 0.9 % (FLUSH) 0.9 %
10 SYRINGE (ML) INJECTION AS NEEDED
Status: DISCONTINUED | OUTPATIENT
Start: 2022-01-03 | End: 2022-01-10 | Stop reason: HOSPADM

## 2022-01-03 RX ORDER — SODIUM CHLORIDE 0.9 % (FLUSH) 0.9 %
10 SYRINGE (ML) INJECTION AS NEEDED
Status: DISCONTINUED | OUTPATIENT
Start: 2022-01-03 | End: 2022-01-03 | Stop reason: SDUPTHER

## 2022-01-03 RX ORDER — ACETAMINOPHEN 325 MG/1
650 TABLET ORAL EVERY 4 HOURS PRN
Status: DISCONTINUED | OUTPATIENT
Start: 2022-01-03 | End: 2022-01-08

## 2022-01-03 RX ORDER — ALUMINA, MAGNESIA, AND SIMETHICONE 2400; 2400; 240 MG/30ML; MG/30ML; MG/30ML
15 SUSPENSION ORAL EVERY 6 HOURS PRN
Status: DISCONTINUED | OUTPATIENT
Start: 2022-01-03 | End: 2022-01-10 | Stop reason: HOSPADM

## 2022-01-03 RX ORDER — GUAIFENESIN, PSEUDOEPHEDRINE HYDROCHLORIDE 600; 60 MG/1; MG/1
1 TABLET, EXTENDED RELEASE ORAL EVERY 12 HOURS
COMMUNITY
End: 2022-01-17

## 2022-01-03 RX ORDER — QUETIAPINE FUMARATE 50 MG/1
50 TABLET, EXTENDED RELEASE ORAL NIGHTLY
Status: DISCONTINUED | OUTPATIENT
Start: 2022-01-03 | End: 2022-01-10 | Stop reason: HOSPADM

## 2022-01-03 RX ORDER — TAMSULOSIN HYDROCHLORIDE 0.4 MG/1
0.4 CAPSULE ORAL DAILY
Status: DISCONTINUED | OUTPATIENT
Start: 2022-01-03 | End: 2022-01-10 | Stop reason: HOSPADM

## 2022-01-03 RX ORDER — OXYBUTYNIN CHLORIDE 5 MG/1
10 TABLET, EXTENDED RELEASE ORAL DAILY
Status: DISCONTINUED | OUTPATIENT
Start: 2022-01-03 | End: 2022-01-08

## 2022-01-03 RX ORDER — DEXAMETHASONE 4 MG/1
6 TABLET ORAL ONCE
Status: COMPLETED | OUTPATIENT
Start: 2022-01-03 | End: 2022-01-03

## 2022-01-03 RX ORDER — AMOXICILLIN 250 MG
2 CAPSULE ORAL 2 TIMES DAILY PRN
Status: DISCONTINUED | OUTPATIENT
Start: 2022-01-03 | End: 2022-01-10 | Stop reason: HOSPADM

## 2022-01-03 RX ADMIN — SODIUM CHLORIDE, POTASSIUM CHLORIDE, SODIUM LACTATE AND CALCIUM CHLORIDE 500 ML: 600; 310; 30; 20 INJECTION, SOLUTION INTRAVENOUS at 09:43

## 2022-01-03 RX ADMIN — ZINC SULFATE 220 MG (50 MG) CAPSULE 220 MG: CAPSULE at 14:53

## 2022-01-03 RX ADMIN — FAMOTIDINE 20 MG: 20 TABLET, FILM COATED ORAL at 17:26

## 2022-01-03 RX ADMIN — FLUTICASONE PROPIONATE 1 SPRAY: 50 SPRAY, METERED NASAL at 21:01

## 2022-01-03 RX ADMIN — SODIUM CHLORIDE, PRESERVATIVE FREE 10 ML: 5 INJECTION INTRAVENOUS at 21:03

## 2022-01-03 RX ADMIN — Medication 1000 UNITS: at 14:53

## 2022-01-03 RX ADMIN — OXYCODONE HYDROCHLORIDE AND ACETAMINOPHEN 1000 MG: 500 TABLET ORAL at 14:53

## 2022-01-03 RX ADMIN — QUETIAPINE FUMARATE 50 MG: 50 TABLET, EXTENDED RELEASE ORAL at 21:01

## 2022-01-03 RX ADMIN — CETIRIZINE HYDROCHLORIDE 10 MG: 10 TABLET ORAL at 14:53

## 2022-01-03 RX ADMIN — ACETAMINOPHEN 650 MG: 325 TABLET ORAL at 17:26

## 2022-01-03 RX ADMIN — ACETAMINOPHEN 1000 MG: 500 TABLET ORAL at 08:27

## 2022-01-03 RX ADMIN — AZITHROMYCIN MONOHYDRATE 500 MG: 500 INJECTION, POWDER, LYOPHILIZED, FOR SOLUTION INTRAVENOUS at 21:15

## 2022-01-03 RX ADMIN — IOPAMIDOL 73 ML: 755 INJECTION, SOLUTION INTRAVENOUS at 10:51

## 2022-01-03 RX ADMIN — ENOXAPARIN SODIUM 40 MG: 40 INJECTION SUBCUTANEOUS at 14:53

## 2022-01-03 RX ADMIN — THIAMINE HCL TAB 100 MG 100 MG: 100 TAB at 21:03

## 2022-01-03 RX ADMIN — Medication 6 MG: at 21:02

## 2022-01-03 RX ADMIN — CEFTRIAXONE SODIUM 2 G: 2 INJECTION, POWDER, FOR SOLUTION INTRAMUSCULAR; INTRAVENOUS at 20:31

## 2022-01-03 RX ADMIN — DEXAMETHASONE 6 MG: 4 TABLET ORAL at 12:31

## 2022-01-03 NOTE — ED PROVIDER NOTES
Subjective   61-year-old male presents to the ER with complaint of persistent fever.  He has a history of hyperlipidemia, diagnosed with Covid at onset of his fever.  Wife is currently in the hospital being treated for Covid.  Patient has not been vaccinated.  Has been having intermittent fevers off and on for the past 2 weeks, brief relief with Tylenol and ibuprofen.  Patient states he is here today because he continues to have fever despite avelina Covid 2 weeks ago.  Has been on Augmentin prescribed by his PCP.  Has had some mild lumbar back pain, no dysuria or hematuria, no frequency urgency.  No abdominal pain, vomiting or diarrhea.  Rates his symptoms as moderate severity.      History provided by:  Patient      Review of Systems   All other systems reviewed and are negative.      Past Medical History:   Diagnosis Date   • Allergic    • Anxiety 8/24/2017   • Hyperlipidemia    • Hypogonadism in male    • Pneumonia due to COVID-19 virus 1/3/2022       No Known Allergies    Past Surgical History:   Procedure Laterality Date   • SHOULDER ACROMIOPLASTY WITH ROTATOR CUFF REPAIR     • SINUS SURGERY      x5       Family History   Problem Relation Age of Onset   • Heart disease Mother    • Cancer Mother         breast   • Breast cancer Mother    • Diabetes Mother    • No Known Problems Father    • No Known Problems Brother    • Colon cancer Neg Hx    • Prostate cancer Neg Hx    • Alcohol abuse Neg Hx    • Drug abuse Neg Hx    • Thyroid cancer Neg Hx    • Thyroid disease Neg Hx        Social History     Socioeconomic History   • Marital status:    Tobacco Use   • Smoking status: Never Smoker   • Smokeless tobacco: Never Used   Vaping Use   • Vaping Use: Never used   Substance and Sexual Activity   • Alcohol use: No   • Drug use: No   • Sexual activity: Defer     Partners: Female           Objective   Physical Exam  Vitals and nursing note reviewed.   Constitutional:       General: He is not in acute  distress.     Appearance: Normal appearance. He is normal weight.   HENT:      Head: Normocephalic and atraumatic.      Nose: Nose normal.      Mouth/Throat:      Mouth: Mucous membranes are moist.      Pharynx: Oropharynx is clear. No oropharyngeal exudate or posterior oropharyngeal erythema.   Eyes:      Extraocular Movements: Extraocular movements intact.      Conjunctiva/sclera: Conjunctivae normal.      Pupils: Pupils are equal, round, and reactive to light.   Cardiovascular:      Rate and Rhythm: Normal rate and regular rhythm.      Pulses: Normal pulses.      Heart sounds: Normal heart sounds.   Pulmonary:      Effort: Pulmonary effort is normal.      Breath sounds: Normal breath sounds. No wheezing, rhonchi or rales.   Abdominal:      General: Abdomen is flat. Bowel sounds are normal. There is no distension.      Palpations: Abdomen is soft.      Tenderness: There is no abdominal tenderness.   Musculoskeletal:         General: No tenderness. Normal range of motion.      Cervical back: Normal range of motion and neck supple. No rigidity. No muscular tenderness.      Right lower leg: No edema.      Left lower leg: No edema.   Skin:     General: Skin is warm and dry.      Capillary Refill: Capillary refill takes less than 2 seconds.      Findings: No rash.   Neurological:      General: No focal deficit present.      Mental Status: He is alert and oriented to person, place, and time. Mental status is at baseline.      Cranial Nerves: No cranial nerve deficit.      Sensory: No sensory deficit.      Motor: No weakness.   Psychiatric:         Mood and Affect: Mood normal.         Behavior: Behavior normal.         Thought Content: Thought content normal.         Procedures         Lab Results (last 24 hours)     Procedure Component Value Units Date/Time    CBC & Differential [[id number]]  (Normal) Collected: 01/03/22 0824    Specimen: Blood Updated: 01/03/22 0914    Narrative:      The following orders were created  for panel order CBC & Differential.  Procedure                               Abnormality         Status                     ---------                               -----------         ------                     CBC Auto Differential[[id number]]        Normal              Final result                 Please view results for these tests on the individual orders.    Comprehensive Metabolic Panel [[id number]]  (Abnormal) Collected: 01/03/22 0824    Specimen: Blood Updated: 01/03/22 0858     Glucose 153 mg/dL      BUN 20 mg/dL      Creatinine 1.48 mg/dL      Sodium 139 mmol/L      Potassium 4.1 mmol/L      Chloride 99 mmol/L      CO2 29.0 mmol/L      Calcium 8.5 mg/dL      Total Protein 6.9 g/dL      Albumin 3.70 g/dL      ALT (SGPT) 167 U/L      AST (SGOT) 94 U/L      Alkaline Phosphatase 137 U/L      Total Bilirubin 0.8 mg/dL      eGFR Non African Amer 49 mL/min/1.73      Globulin 3.2 gm/dL      A/G Ratio 1.2 g/dL      BUN/Creatinine Ratio 13.5     Anion Gap 11.0 mmol/L     Narrative:      GFR Normal >60  Chronic Kidney Disease <60  Kidney Failure <15      Protime-INR [[id number]]  (Normal) Collected: 01/03/22 0824    Specimen: Blood Updated: 01/03/22 0850     Protime 12.8 Seconds      INR 1.00    D-dimer, Quantitative [[id number]]  (Abnormal) Collected: 01/03/22 0824    Specimen: Blood Updated: 01/03/22 0850     D-Dimer, Quantitative 1.54 mg/L (FEU)     Narrative:      Reference Range is 0-0.50 mg/L FEU. However, results <0.50 mg/L FEU tends to rule out DVT or PE. Results >0.50 mg/L FEU are not useful in predicting absence or presence of DVT or PE.      Blood Culture - Blood, Arm, Left [[id number]] Collected: 01/03/22 0824    Specimen: Blood from Arm, Left Updated: 01/03/22 0945    Lactic Acid, Plasma [[id number]]  (Normal) Collected: 01/03/22 0824    Specimen: Blood Updated: 01/03/22 0858     Lactate 1.6 mmol/L     Procalcitonin [[id number]]  (Normal) Collected: 01/03/22 0824    Specimen: Blood Updated: 01/03/22 0904      "Procalcitonin 0.18 ng/mL     Narrative:      As a Marker for Sepsis (Non-Neonates):     1. <0.5 ng/mL represents a low risk of severe sepsis and/or septic shock.  2. >2 ng/mL represents a high risk of severe sepsis and/or septic shock.    As a Marker for Lower Respiratory Tract Infections that require antibiotic therapy:  PCT on Admission     Antibiotic Therapy             6-12 Hrs later  >0.5                          Strongly Recommended            >0.25 - <0.5             Recommended  0.1 - 0.25                  Discouraged                       Remeasure/reassess PCT  <0.1                         Strongly Discouraged         Remeasure/reassess PCT      As 28 day mortality risk marker: \"Change in Procalcitonin Result\" (>80% or <=80%) if Day 0 (or Day 1) and Day 4 values are available. Refer to http://www.[facility]idNetLexpct-calculator.com/    Change in PCT <=80 %   A decrease of PCT levels below or equal to 80% defines a positive change in PCT test result representing a higher risk for 28-day all-cause mortality of patients diagnosed with severe sepsis or septic shock.    Change in PCT >80 %   A decrease of PCT levels of more than 80% defines a negative change in PCT result representing a lower risk for 28-day all-cause mortality of patients diagnosed with severe sepsis or septic shock.                C-reactive Protein [[id number]]  (Abnormal) Collected: 01/03/22 0824    Specimen: Blood Updated: 01/03/22 0858     C-Reactive Protein 8.14 mg/dL     CBC Auto Differential [[id number]]  (Normal) Collected: 01/03/22 0824    Specimen: Blood Updated: 01/03/22 0914     WBC 6.70 10*3/mm3      RBC 5.26 10*6/mm3      Hemoglobin 16.0 g/dL      Hematocrit 48.4 %      MCV 92.0 fL      MCH 30.4 pg      MCHC 33.1 g/dL      RDW 13.2 %      RDW-SD 45.2 fl      MPV 9.8 fL      Platelets 187 10*3/mm3     Manual Differential [[id number]]  (Abnormal) Collected: 01/03/22 0824    Specimen: Blood Updated: 01/03/22 0914     Neutrophil % 90.0 %      " Lymphocyte % 4.0 %      Monocyte % 4.0 %      Atypical Lymphocyte % 2.0 %      Neutrophils Absolute 6.03 10*3/mm3      Lymphocytes Absolute 0.40 10*3/mm3      Monocytes Absolute 0.27 10*3/mm3      RBC Morphology Normal     WBC Morphology Normal     Platelet Morphology Normal    Blood Culture - Blood, Arm, Right [[id number]] Collected: 01/03/22 0825    Specimen: Blood from Arm, Right Updated: 01/03/22 0946    COVID-19 and FLU A/B PCR - Swab, Nasopharynx [[id number]]  (Abnormal) Collected: 01/03/22 0827    Specimen: Swab from Nasopharynx Updated: 01/03/22 0907     COVID19 Detected     Influenza A PCR Not Detected     Influenza B PCR Not Detected    Narrative:      Fact sheet for providers: https://www.fda.gov/media/526154/download    Fact sheet for patients: https://www.fda.gov/media/166306/download    Test performed by PCR.  Influenza A and Influenza B negative results should be considered presumptive in samples that have a positive SARS-CoV-2 result.    Competitive inhibition studies showed that SARS-CoV-2 virus, when present at concentrations above 3.6E+04 copies/mL, can inhibit the detection and amplification of influenza A and influenza B virus RNA if present at or below 1.8E+02 copies/mL or 4.9E+02 copies/mL, respectively, and may lead to false negative influenza virus results. If co-infection with influenza A or influenza B virus is suspected in samples with a positive SARS-CoV-2 result, the sample should be re-tested with another FDA cleared, approved, or authorized influenza test, if influenza virus detection would change clinical management.    Blood Gas, Arterial - [[id number]]  (Abnormal) Collected: 01/03/22 0943    Specimen: Arterial Blood Updated: 01/03/22 0941     Site Right Radial     Jeb's Test Positive     pH, Arterial 7.487 pH units      Comment: 83 Value above reference range        pCO2, Arterial 35.6 mm Hg      pO2, Arterial 54.0 mm Hg      Comment: 85 Value below critical limit        HCO3,  Arterial 26.9 mmol/L      Comment: 83 Value above reference range        Base Excess, Arterial 3.7 mmol/L      Comment: 83 Value above reference range        O2 Saturation, Arterial 91.1 %      Comment: 84 Value below reference range        Temperature 37.0 C      Barometric Pressure for Blood Gas 762 mmHg      Modality Nasal Cannula     Flow Rate 2.0 lpm      Ventilator Mode NA     Notified Who DR JUSTICE     Notified By [id number]     Notified Time 01/03/2022 09:48     Collected by [id number]     Comment: Meter: [id number]     :  [id number]        pCO2, Temperature Corrected 35.6 mm Hg      pH, Temp Corrected 7.487 pH Units      pO2, Temperature Corrected 54.0 mm Hg         ED Course  ED Course as of 01/03/22 1218   Mon Jan 03, 2022   1214 61-year-old male with history of hyperlipidemia has been sick for the past 2 weeks with Covid.  Here today due to ongoing fever.  Did not complain of any increased shortness of breath but was hypoxic on arrival to the emergency department.  Sats dropped to upper 80s on room air.  He was placed on 2 L nasal cannula and his PO2 was only 54 so he was transitioned to high flow nasal cannula.  Currently on Salter device at 8 L.  Sats have been in the mid to upper 90s.  D-dimer was elevated, CTA negative for PE but did show extensive Covid pneumonia.  Need admission for oxygen supplementation and steroids. [AW]      ED Course User Index  [AW] Luis Manuel Justice MD                                                 [facility]    Final diagnoses:   Pneumonia due to COVID-19 virus   Hypoxia       ED Disposition  ED Disposition     ED Disposition Condition Comment    Decision to Admit  Level of Care: Remote Telemetry [26]   Diagnosis: Pneumonia due to COVID-19 virus [[id number]]   Admitting Physician: SREE ZULETA [[id number]]   Attending Physician: SREE ZULETA [[id number]]   Isolate for COVID?: Yes [1]   Certification: I Certify That Inpatient Hospital Services Are Medically  Necessary For Greater Than 2 Midnights            No follow-up provider specified.       Medication List      No changes were made to your prescriptions during this visit.          Luis Manuel Justice MD  01/03/22 9980

## 2022-01-03 NOTE — CONSULTS
INFECTIOUS DISEASES CONSULT NOTE    Patient:  Shant Arevalo 61 y.o. male  ROOM # 444/1  YOB: 1960  MRN: [id number]  [facility]:  [id number]  Admit date: 1/3/2022   Admitting Physician: Geoffrey Newsome DO  Primary Care Physician: Provider, No Known  REFERRING PROVIDER: Provider, No Known    Reason for Consultation: Consideration of tocilizumab and baricitinib    History of Present Illness/Chief Complaint: 61-year-old man. He indicates his wife was diagnosed with Covid little more than 2 weeks ago. He indicates he has been running temperature elevations for about the past 10 days. He indicates some intermittent chills. Does not report significant productive cough or sputum production. He presented to the hospital. He was admitted today. He is requiring high flow supplemental oxygen at 10 L. Current oxygen saturations 95%. He has had a maximum temperature elevation earlier today of 101.8. He is not having diarrhea. Reports no headache. No neurological symptoms. No leg pain or swelling. I checked with the pharmacy. We have no tocilizumab available at present. Unclear when we will have additional shipment for administration. Infectious disease asked to evaluate and offer recommendations.  He has not had COVID-19 vaccination.    Current Scheduled Medications:   ascorbic acid, 1,000 mg, Oral, Daily  atorvastatin, 20 mg, Oral, Nightly  busPIRone, 5 mg, Oral, BID  cetirizine, 10 mg, Oral, Daily  cholecalciferol, 1,000 Units, Oral, Daily  [START ON 1/4/2022] dexamethasone, 6 mg, Oral, Daily With Breakfast  enoxaparin, 40 mg, Subcutaneous, Q24H  famotidine, 20 mg, Oral, BID AC  FLUoxetine, 10 mg, Oral, Daily  fluticasone, 1 spray, Each Nare, BID  melatonin, 6 mg, Oral, Nightly  oxybutynin XL, 10 mg, Oral, Daily  QUEtiapine fumarate ER, 50 mg, Oral, Nightly  sodium chloride, 10 mL, Intravenous, Q12H  tamsulosin, 0.4 mg, Oral, Daily  zinc sulfate, 220 mg, Oral, Daily      Current PRN Medications:  •   "acetaminophen  •  aluminum-magnesium hydroxide-simethicone  •  senna-docusate sodium **AND** polyethylene glycol **AND** bisacodyl **AND** bisacodyl  •  ondansetron  •  [COMPLETED] Insert peripheral IV **AND** sodium chloride    Allergies:  No Known Allergies     Past Medical History: He reports he is otherwise healthy.  No history of diabetes mellitus, hypertension, heart, renal, lung disease, or asthma.    Past Surgical History: Shoulder surgery.    Social History: He reports no tobacco use. No heavy alcohol use. Reports no illicit drug use. Indicates he is retired. He is .    Family History:     Exposure History: Wife has had Covid.    Review of Systems    Vital Signs:  /78 (BP Location: Left arm, Patient Position: Lying)   Pulse 103   Temp 100.4 °F (38 °C) (Oral)   Resp 18   Ht 182.9 cm (72\")   Wt 86.2 kg (190 lb)   SpO2 96%   BMI 25.77 kg/m²  Temp (24hrs), Av.5 °F (38.1 °C), Min:98.8 °F (37.1 °C), Max:101.8 °F (38.8 °C)    Physical Exam  Vital signs - reviewed.  Line/IV site - No erythema or tenderness.  Lungs clear without crackles. Good air movement. No wheezing.  Heart regular rhythm without murmur  Abdomen is soft and nontender. No hepatosplenomegaly or mass.  No hepatosplenomegaly  Skin without rash  Neurological examination nonfocal  Extremities without significant edema    Lab Results:  CBC: Results from last 7 days   Lab Units 22  0824   WBC 10*3/mm3 6.70   HEMOGLOBIN g/dL 16.0   HEMATOCRIT % 48.4   PLATELETS 10*3/mm3 187   LYMPHOCYTE % % 4.0*   MONOCYTES % % 4.0*     CMP:   Results from last 7 days   Lab Units 22  0824   SODIUM mmol/L 139   POTASSIUM mmol/L 4.1   CHLORIDE mmol/L 99   CO2 mmol/L 29.0   BUN mg/dL 20   CREATININE mg/dL 1.48*   CALCIUM mg/dL 8.5*   BILIRUBIN mg/dL 0.8   ALK PHOS U/L 137*   ALT (SGPT) U/L 167*   AST (SGOT) U/L 94*   GLUCOSE mg/dL 153*     Ferritin 652  C-reactive protein 8.1  Procalcitonin 0.1  D-dimer 1.5    COVID19   Not Detected - " Ref. Range Detected Critical     Influenza A PCR   Not Detected Not Detected    Influenza B PCR   Not Detected Not Detected      Radiology:     CT chest 1/3/2022 personally reviewed:  IMPRESSION:  1.  No evidence of pulmonary embolus.  2.  Fairly extensive bilateral groundglass opacities and scattered areas  of consolidation. Findings are in keeping with Covid 19 pneumonia.  This report was finalized on 01/03/2022 11:00 by Dr. Piyush Carpio MD.      Additional Studies Reviewed:     Impression:   1. COVID-19 infection/COVID-19 pneumonia  2. Respiratory failure secondary COVID-19  3. Possible secondary bacterial infection-he reports fever approaching 14 days. Some of the infiltrate particular on the right looks fairly dense and could represent secondary bacterial infection. The normal pro calcitonin some evidence against secondary bacterial infection.  4. Hepatic transaminitis likely related to Covid    Recommendations:    Would continue treatment dexamethasone  Continue prophylactic anticoagulation  Would monitor CRP, oxygen requirements, and clinical course over the next 24 to 48 hours  If appears to be showing deterioration will add baricitinib (no tocilizumab available at present)  Suggest the addition of empiric antibiotic therapy  Will begin ceftriaxone and azithromycin  Will send urine pneumococcal, Legionella antigen along with MRSA screen  Continue to follow    Marlon Reyes MD  01/03/22  17:17 CST

## 2022-01-03 NOTE — PLAN OF CARE
Goal Outcome Evaluation:  Plan of Care Reviewed With: patient        Progress: no change  Outcome Summary: Admitted from ER with COVID PNA. On 10L highflow. Denies pain. Encouraged pt to turn side to side and prone. Safety maintained.

## 2022-01-03 NOTE — ED TRIAGE NOTES
Patient presents to ED with CC of fever of 101-103 over the last 2 weeks. Patient saw his PCP and was placed on Augmentin for a possible ear infection. Patient states his fever has not gotten any better.

## 2022-01-04 LAB
ALBUMIN SERPL-MCNC: 3.2 G/DL (ref 3.5–5.2)
ALBUMIN/GLOB SERPL: 0.9 G/DL
ALP SERPL-CCNC: 117 U/L (ref 39–117)
ALT SERPL W P-5'-P-CCNC: 105 U/L (ref 1–41)
ANION GAP SERPL CALCULATED.3IONS-SCNC: 10 MMOL/L (ref 5–15)
AST SERPL-CCNC: 47 U/L (ref 1–40)
BACTERIA SPEC AEROBE CULT: NO GROWTH
BASOPHILS # BLD AUTO: 0.01 10*3/MM3 (ref 0–0.2)
BASOPHILS NFR BLD AUTO: 0.1 % (ref 0–1.5)
BILIRUB SERPL-MCNC: 0.6 MG/DL (ref 0–1.2)
BUN SERPL-MCNC: 22 MG/DL (ref 6–20)
BUN/CREAT SERPL: 17.2 (ref 7–25)
CALCIUM SPEC-SCNC: 8.1 MG/DL (ref 8.6–10.5)
CHLORIDE SERPL-SCNC: 102 MMOL/L (ref 98–107)
CO2 SERPL-SCNC: 27 MMOL/L (ref 22–29)
CREAT SERPL-MCNC: 1.28 MG/DL (ref 0.76–1.27)
CRP SERPL-MCNC: 11.09 MG/DL (ref 0–0.5)
DEPRECATED RDW RBC AUTO: 45.7 FL (ref 37–54)
EOSINOPHIL # BLD AUTO: 0 10*3/MM3 (ref 0–0.4)
EOSINOPHIL NFR BLD AUTO: 0 % (ref 0.3–6.2)
ERYTHROCYTE [DISTWIDTH] IN BLOOD BY AUTOMATED COUNT: 13.2 % (ref 12.3–15.4)
FERRITIN SERPL-MCNC: 689.3 NG/ML (ref 30–400)
GFR SERPL CREATININE-BSD FRML MDRD: 58 ML/MIN/1.73
GLOBULIN UR ELPH-MCNC: 3.5 GM/DL
GLUCOSE SERPL-MCNC: 151 MG/DL (ref 65–99)
HCT VFR BLD AUTO: 45.1 % (ref 37.5–51)
HGB BLD-MCNC: 14.6 G/DL (ref 13–17.7)
LYMPHOCYTES # BLD AUTO: 0.56 10*3/MM3 (ref 0.7–3.1)
LYMPHOCYTES NFR BLD AUTO: 6.5 % (ref 19.6–45.3)
MCH RBC QN AUTO: 30.4 PG (ref 26.6–33)
MCHC RBC AUTO-ENTMCNC: 32.4 G/DL (ref 31.5–35.7)
MCV RBC AUTO: 94 FL (ref 79–97)
MONOCYTES # BLD AUTO: 0.4 10*3/MM3 (ref 0.1–0.9)
MONOCYTES NFR BLD AUTO: 4.6 % (ref 5–12)
NEUTROPHILS NFR BLD AUTO: 7.56 10*3/MM3 (ref 1.7–7)
NEUTROPHILS NFR BLD AUTO: 87.1 % (ref 42.7–76)
PLATELET # BLD AUTO: 209 10*3/MM3 (ref 140–450)
PMV BLD AUTO: 10.1 FL (ref 6–12)
POTASSIUM SERPL-SCNC: 4.5 MMOL/L (ref 3.5–5.2)
PROT SERPL-MCNC: 6.7 G/DL (ref 6–8.5)
RBC # BLD AUTO: 4.8 10*6/MM3 (ref 4.14–5.8)
SODIUM SERPL-SCNC: 139 MMOL/L (ref 136–145)
WBC NRBC COR # BLD: 8.68 10*3/MM3 (ref 3.4–10.8)

## 2022-01-04 PROCEDURE — 82728 ASSAY OF FERRITIN: CPT | Performed by: FAMILY MEDICINE

## 2022-01-04 PROCEDURE — 80053 COMPREHEN METABOLIC PANEL: CPT | Performed by: FAMILY MEDICINE

## 2022-01-04 PROCEDURE — 25010000002 ENOXAPARIN PER 10 MG: Performed by: FAMILY MEDICINE

## 2022-01-04 PROCEDURE — 85025 COMPLETE CBC W/AUTO DIFF WBC: CPT | Performed by: FAMILY MEDICINE

## 2022-01-04 PROCEDURE — 63710000001 DEXAMETHASONE PER 0.25 MG: Performed by: FAMILY MEDICINE

## 2022-01-04 PROCEDURE — 25010000002 CEFTRIAXONE PER 250 MG: Performed by: INTERNAL MEDICINE

## 2022-01-04 PROCEDURE — 25010000002 AZITHROMYCIN PER 500 MG: Performed by: INTERNAL MEDICINE

## 2022-01-04 PROCEDURE — 94799 UNLISTED PULMONARY SVC/PX: CPT

## 2022-01-04 PROCEDURE — 86140 C-REACTIVE PROTEIN: CPT | Performed by: FAMILY MEDICINE

## 2022-01-04 RX ADMIN — ACETAMINOPHEN 650 MG: 325 TABLET ORAL at 21:18

## 2022-01-04 RX ADMIN — FLUTICASONE PROPIONATE 1 SPRAY: 50 SPRAY, METERED NASAL at 21:30

## 2022-01-04 RX ADMIN — DEXAMETHASONE 6 MG: 4 TABLET ORAL at 08:18

## 2022-01-04 RX ADMIN — Medication 6 MG: at 21:18

## 2022-01-04 RX ADMIN — Medication 1 APPLICATION: at 08:19

## 2022-01-04 RX ADMIN — ZINC SULFATE 220 MG (50 MG) CAPSULE 220 MG: CAPSULE at 08:19

## 2022-01-04 RX ADMIN — ACETAMINOPHEN 650 MG: 325 TABLET ORAL at 11:30

## 2022-01-04 RX ADMIN — FAMOTIDINE 20 MG: 20 TABLET, FILM COATED ORAL at 17:45

## 2022-01-04 RX ADMIN — Medication 1000 UNITS: at 08:19

## 2022-01-04 RX ADMIN — CETIRIZINE HYDROCHLORIDE 10 MG: 10 TABLET ORAL at 08:19

## 2022-01-04 RX ADMIN — QUETIAPINE FUMARATE 50 MG: 50 TABLET, EXTENDED RELEASE ORAL at 21:18

## 2022-01-04 RX ADMIN — AZITHROMYCIN MONOHYDRATE 500 MG: 500 INJECTION, POWDER, LYOPHILIZED, FOR SOLUTION INTRAVENOUS at 21:18

## 2022-01-04 RX ADMIN — ACETAMINOPHEN 650 MG: 325 TABLET ORAL at 04:13

## 2022-01-04 RX ADMIN — THIAMINE HCL TAB 100 MG 100 MG: 100 TAB at 08:19

## 2022-01-04 RX ADMIN — ENOXAPARIN SODIUM 40 MG: 40 INJECTION SUBCUTANEOUS at 17:45

## 2022-01-04 RX ADMIN — CEFTRIAXONE SODIUM 2 G: 2 INJECTION, POWDER, FOR SOLUTION INTRAMUSCULAR; INTRAVENOUS at 20:01

## 2022-01-04 RX ADMIN — FAMOTIDINE 20 MG: 20 TABLET, FILM COATED ORAL at 08:19

## 2022-01-04 RX ADMIN — OXYCODONE HYDROCHLORIDE AND ACETAMINOPHEN 1000 MG: 500 TABLET ORAL at 08:18

## 2022-01-04 RX ADMIN — Medication 1 APPLICATION: at 21:18

## 2022-01-04 NOTE — PLAN OF CARE
Goal Outcome Evaluation:              Outcome Summary: No c/o pain. Pt had a temperature of 100.1 during the night and requested tylenol with relief. SpO2 mid 90s on 10L. Sinus 79-91.

## 2022-01-04 NOTE — H&P
[facility]  HISTORY AND PHYSICAL    Date of Admission: 1/3/2022  Primary Care Physician: Provider, No Known    Subjective     Chief Complaint:   Shortness of breath    History of Present Illness    This 61-year-old male presents to the emergency department with a complaint of a persistent fever.  The patient's wife is currently being treated for Covid.  He has not been vaccinated for Covid.  He notes symptom onset about 2 weeks ago with associated intermittent fevers off and on.  He presented because he was short of breath and had been continued to have a fever despite exposure 2 weeks ago.  His PCP has prescribed him Augmentin on an outpatient basis without resolution of symptoms.  The patient's symptom onset is outside the window of treatment for remdesivir.    Work-up in the emergency department reveals a normal procalcitonin.  Urinalysis is unremarkable except trace of bacteria and 6-12 WBCs.  ABG show pH 7.487 with PO2 54 on 2 L per nasal cannula.  Covid swab is positive.  Influenza AMB are negative.  Metabolic panel reveals creatinine 1.48 with  and AST 94.  D-dimer elevated at 1.54.  INR, lactate, procalcitonin within normal limits.  CBC is unremarkable except lymphopenia.  Ferritin elevated at 652.4 and CRP elevated at 8.14.    Review of Systems   Constitutional: Positive for fever.   HENT: Positive for sinus pressure and sinus pain.    Eyes: Negative.    Respiratory: Negative.    Cardiovascular: Negative.    Gastrointestinal: Negative.    Endocrine: Negative.    Genitourinary: Negative.    Musculoskeletal: Positive for back pain.   Skin: Negative.    Allergic/Immunologic: Negative.    Neurological: Negative.    Hematological: Negative.    Psychiatric/Behavioral: Negative.       Otherwise complete ROS reviewed and negative except as mentioned in the HPI.    Past Medical History:     Past Medical History:   Diagnosis Date   • Allergic    • Anxiety  8/24/2017   • Environmental and seasonal allergies 9/28/2017   • ETD (eustachian tube dysfunction) 9/28/2017   • Hyperlipidemia    • Hypogonadism in male    • Pneumonia due to COVID-19 virus 1/3/2022       Past Surgical History:  Past Surgical History:   Procedure Laterality Date   • SHOULDER ACROMIOPLASTY WITH ROTATOR CUFF REPAIR     • SINUS SURGERY      x5       Family History:   family history includes Breast cancer in his mother; Cancer in his mother; Diabetes in his mother; Heart disease in his mother; No Known Problems in his brother and father.    Social History:    reports that he has never smoked. He has never used smokeless tobacco. He reports that he does not drink alcohol and does not use drugs.    Medications:  Prior to Admission medications    Medication Sig Start Date End Date Taking? Authorizing Provider   azelastine (ASTELIN) 0.1 % nasal spray 2 sprays into each nostril 2 (Two) Times a Day. Use in each nostril as directed 12/4/17  Yes Napoleon Regan MD   cetirizine (zyrTEC) 10 MG tablet Take 10 mg by mouth Daily.   Yes Jesse De La Fuente MD   eszopiclone (LUNESTA) 2 MG tablet Take 2 mg by mouth Every Night. Take immediately before bedtime   Yes Jesse De La Fuente MD   fluticasone (FLONASE) 50 MCG/ACT nasal spray 1 spray into each nostril 2 (Two) Times a Day. 12/4/17  Yes Napoleon Regan MD   Iron-Vitamins (GERITOL PO) Take 1 tablet by mouth Daily.   Yes Jesse De La Fuente MD   pseudoephedrine-guaifenesin (MUCINEX D)  MG per 12 hr tablet Take 1 tablet by mouth Every 12 (Twelve) Hours.   Yes Jesse De La Fuente MD   QUEtiapine fumarate ER (SEROquel XR) 50 MG tablet sustained-release 24 hour tablet Take 50 mg by mouth Daily. 12/31/20  Yes Jesse De La Fuente MD   Testosterone Cypionate 200 MG/ML kit Inject 0.75 mL into the shoulder, thigh, or buttocks 1 (One) Time Per Week. 12/4/17  Yes Napoleon Regan MD   promethazine (PHENERGAN) 25 MG tablet Take 1 tablet by mouth  "Every 6 (Six) Hours As Needed for Nausea or Vomiting. 11/5/20   Jarrod Rocha MD   trospium (SANCTURA) 20 MG tablet Take 1 tablet by mouth 2 (Two) Times a Day Before Meals. 10/14/21   Tony Begum MD B-D 3CC LUER-EMIL SYR 14BE8-2/2 22G X 1-1/2\" 3 ML misc See Admin Instructions. 2/27/21 1/3/22  Jesse De La Fuente MD B-D HYPODERMIC NEEDLE 18GX1.5\" 18G X 1-1/2\" misc USE 1 ONCE A WEEK WITH TESTOSTERONE 12/29/20 1/3/22  Jesse De La Fuente MD   busPIRone (BUSPAR) 5 MG tablet Take 5 mg by mouth 2 (Two) Times a Day. 11/20/20 1/3/22  Jesse De La Fuente MD   FLUoxetine (PROzac) 10 MG capsule TAKE 1 CAPSULE BY MOUTH ONCE DAILY FOR 30 DAYS 11/20/20 1/3/22  Jesse De La Fuente MD   ibuprofen (ADVIL,MOTRIN) 800 MG tablet Take 800 mg by mouth 3 (Three) Times a Day With Meals. NULL 12/18/20 1/3/22  Jesse De La Fuente MD   Pseudoephedrine-DM-GG (PSEUDO-GUAIFEN-DEX PO) Take  by mouth Daily.  1/3/22  Jesse De La Fuente MD   tamsulosin (FLOMAX) 0.4 MG capsule 24 hr capsule Use two 0.4 mg daily. 4/5/21 1/3/22  Minh Burgess PA       Allergies:  No Known Allergies    Objective     Vital Signs:   /80 (BP Location: Left arm, Patient Position: Lying)   Pulse 103   Temp (!) 101 °F (38.3 °C) (Oral) Comment: RN notified.  Resp 18   Ht 182.9 cm (72\")   Wt 86.2 kg (190 lb)   SpO2 95%   BMI 25.77 kg/m²     Physical Exam  Constitutional:       General: He is not in acute distress.     Appearance: Normal appearance. He is normal weight.   HENT:      Head: Normocephalic and atraumatic.      Nose: Nose normal.      Mouth/Throat:      Mouth: Mucous membranes are moist.      Pharynx: Oropharynx is clear. No oropharyngeal exudate or posterior oropharyngeal erythema.   Eyes:      Extraocular Movements: Extraocular movements intact.      Conjunctiva/sclera: Conjunctivae normal.      Pupils: Pupils are equal, round, and reactive to light.   Cardiovascular:      Rate and Rhythm: Normal rate and regular " rhythm.      Pulses: Normal pulses.      Heart sounds: Normal heart sounds.   Pulmonary:      Effort: Pulmonary effort is normal.      Breath sounds: Normal breath sounds. No wheezing, rhonchi or rales.   Abdominal:      General: Abdomen is flat. Bowel sounds are normal. There is no distension.      Palpations: Abdomen is soft.      Tenderness: There is no abdominal tenderness.   Musculoskeletal:         General: No tenderness. Normal range of motion.      Cervical back: Normal range of motion and neck supple. No rigidity. No muscular tenderness.      Right lower leg: No edema.      Left lower leg: No edema.   Skin:     General: Skin is warm and dry.      Capillary Refill: Capillary refill takes less than 2 seconds.      Findings: No rash.   Neurological:      General: No focal deficit present.      Mental Status: He is alert and oriented to person, place, and time. Mental status is at baseline.      Cranial Nerves: No cranial nerve deficit.      Sensory: No sensory deficit.      Motor: No weakness.   Psychiatric:         Mood and Affect: Mood normal.         Behavior: Behavior normal.         Thought Content: Thought content normal.     Results Reviewed:  Lab Results (last 24 hours)     Procedure Component Value Units Date/Time    Procalcitonin [[id number]]  (Normal) Collected: 01/03/22 1353    Specimen: Blood Updated: 01/03/22 1437     Procalcitonin 0.16 ng/mL     Narrative:      As a Marker for Sepsis (Non-Neonates):     1. <0.5 ng/mL represents a low risk of severe sepsis and/or septic shock.  2. >2 ng/mL represents a high risk of severe sepsis and/or septic shock.    As a Marker for Lower Respiratory Tract Infections that require antibiotic therapy:  PCT on Admission     Antibiotic Therapy             6-12 Hrs later  >0.5                          Strongly Recommended            >0.25 - <0.5             Recommended  0.1 - 0.25                  Discouraged                       Remeasure/reassess PCT  <0.1          "                Strongly Discouraged         Remeasure/reassess PCT      As 28 day mortality risk marker: \"Change in Procalcitonin Result\" (>80% or <=80%) if Day 0 (or Day 1) and Day 4 values are available. Refer to http://www.[facility]-pct-calculator.com/    Change in PCT <=80 %   A decrease of PCT levels below or equal to 80% defines a positive change in PCT test result representing a higher risk for 28-day all-cause mortality of patients diagnosed with severe sepsis or septic shock.    Change in PCT >80 %   A decrease of PCT levels of more than 80% defines a negative change in PCT result representing a lower risk for 28-day all-cause mortality of patients diagnosed with severe sepsis or septic shock.                Ferritin [[id number]]  (Abnormal) Collected: 01/03/22 0824    Specimen: Blood Updated: 01/03/22 1347     Ferritin 652.40 ng/mL     Narrative:      Results may be falsely decreased if patient taking Biotin.      Urinalysis, Microscopic Only - Urine, Clean Catch [[id number]]  (Abnormal) Collected: 01/03/22 1316    Specimen: Urine, Clean Catch Updated: 01/03/22 1344     RBC, UA None Seen /HPF      WBC, UA 6-12 /HPF      Bacteria, UA Trace /HPF      Squamous Epithelial Cells, UA None Seen /HPF      Hyaline Casts, UA None Seen /LPF      Methodology Manual Light Microscopy    Urinalysis With Culture If Indicated - Urine, Clean Catch [[id number]]  (Abnormal) Collected: 01/03/22 1316    Specimen: Urine, Clean Catch Updated: 01/03/22 1344     Color, UA Dark Yellow     Appearance, UA Clear     pH, UA 6.0     Specific Gravity, UA >1.030     Glucose, UA Negative     Ketones, UA Trace     Bilirubin, UA Small (1+)     Blood, UA Negative     Protein,  mg/dL (2+)     Leuk Esterase, UA Trace     Nitrite, UA Negative     Urobilinogen, UA 2.0 E.U./dL    Urine Culture - Urine, Urine, Clean Catch [[id number]] Collected: 01/03/22 1316    Specimen: Urine, Clean Catch Updated: 01/03/22 1344    Blood Culture - Blood, Arm, Right " [[id number]] Collected: 01/03/22 0825    Specimen: Blood from Arm, Right Updated: 01/03/22 0946    Blood Culture - Blood, Arm, Left [[id number]] Collected: 01/03/22 0824    Specimen: Blood from Arm, Left Updated: 01/03/22 0945    Blood Gas, Arterial - [[id number]]  (Abnormal) Collected: 01/03/22 0943    Specimen: Arterial Blood Updated: 01/03/22 0941     Site Right Radial     Jeb's Test Positive     pH, Arterial 7.487 pH units      Comment: 83 Value above reference range        pCO2, Arterial 35.6 mm Hg      pO2, Arterial 54.0 mm Hg      Comment: 85 Value below critical limit        HCO3, Arterial 26.9 mmol/L      Comment: 83 Value above reference range        Base Excess, Arterial 3.7 mmol/L      Comment: 83 Value above reference range        O2 Saturation, Arterial 91.1 %      Comment: 84 Value below reference range        Temperature 37.0 C      Barometric Pressure for Blood Gas 762 mmHg      Modality Nasal Cannula     Flow Rate 2.0 lpm      Ventilator Mode NA     Notified Who DR MEDLEY     Notified By [id number]     Notified Time 01/03/2022 09:48     Collected by [id number]     Comment: Meter: [id number]     :  [id number]        pCO2, Temperature Corrected 35.6 mm Hg      pH, Temp Corrected 7.487 pH Units      pO2, Temperature Corrected 54.0 mm Hg     Manual Differential [[id number]]  (Abnormal) Collected: 01/03/22 0824    Specimen: Blood Updated: 01/03/22 0914     Neutrophil % 90.0 %      Lymphocyte % 4.0 %      Monocyte % 4.0 %      Atypical Lymphocyte % 2.0 %      Neutrophils Absolute 6.03 10*3/mm3      Lymphocytes Absolute 0.40 10*3/mm3      Monocytes Absolute 0.27 10*3/mm3      RBC Morphology Normal     WBC Morphology Normal     Platelet Morphology Normal    CBC & Differential [[id number]]  (Normal) Collected: 01/03/22 0824    Specimen: Blood Updated: 01/03/22 0914    Narrative:      The following orders were created for panel order CBC & Differential.  Procedure                               Abnormality          Status                     ---------                               -----------         ------                     CBC Auto Differential[[id number]]        Normal              Final result                 Please view results for these tests on the individual orders.    CBC Auto Differential [[id number]]  (Normal) Collected: 01/03/22 0824    Specimen: Blood Updated: 01/03/22 0914     WBC 6.70 10*3/mm3      RBC 5.26 10*6/mm3      Hemoglobin 16.0 g/dL      Hematocrit 48.4 %      MCV 92.0 fL      MCH 30.4 pg      MCHC 33.1 g/dL      RDW 13.2 %      RDW-SD 45.2 fl      MPV 9.8 fL      Platelets 187 10*3/mm3     COVID-19 and FLU A/B PCR - Swab, Nasopharynx [[id number]]  (Abnormal) Collected: 01/03/22 0827    Specimen: Swab from Nasopharynx Updated: 01/03/22 0907     COVID19 Detected     Influenza A PCR Not Detected     Influenza B PCR Not Detected    Narrative:      Fact sheet for providers: https://www.fda.gov/media/159412/download    Fact sheet for patients: https://www.fda.gov/media/872948/download    Test performed by PCR.  Influenza A and Influenza B negative results should be considered presumptive in samples that have a positive SARS-CoV-2 result.    Competitive inhibition studies showed that SARS-CoV-2 virus, when present at concentrations above 3.6E+04 copies/mL, can inhibit the detection and amplification of influenza A and influenza B virus RNA if present at or below 1.8E+02 copies/mL or 4.9E+02 copies/mL, respectively, and may lead to false negative influenza virus results. If co-infection with influenza A or influenza B virus is suspected in samples with a positive SARS-CoV-2 result, the sample should be re-tested with another FDA cleared, approved, or authorized influenza test, if influenza virus detection would change clinical management.    Procalcitonin [[id number]]  (Normal) Collected: 01/03/22 0824    Specimen: Blood Updated: 01/03/22 0904     Procalcitonin 0.18 ng/mL     Narrative:      As a Marker  "for Sepsis (Non-Neonates):     1. <0.5 ng/mL represents a low risk of severe sepsis and/or septic shock.  2. >2 ng/mL represents a high risk of severe sepsis and/or septic shock.    As a Marker for Lower Respiratory Tract Infections that require antibiotic therapy:  PCT on Admission     Antibiotic Therapy             6-12 Hrs later  >0.5                          Strongly Recommended            >0.25 - <0.5             Recommended  0.1 - 0.25                  Discouraged                       Remeasure/reassess PCT  <0.1                         Strongly Discouraged         Remeasure/reassess PCT      As 28 day mortality risk marker: \"Change in Procalcitonin Result\" (>80% or <=80%) if Day 0 (or Day 1) and Day 4 values are available. Refer to http://www.[facility]ct-calculator.com/    Change in PCT <=80 %   A decrease of PCT levels below or equal to 80% defines a positive change in PCT test result representing a higher risk for 28-day all-cause mortality of patients diagnosed with severe sepsis or septic shock.    Change in PCT >80 %   A decrease of PCT levels of more than 80% defines a negative change in PCT result representing a lower risk for 28-day all-cause mortality of patients diagnosed with severe sepsis or septic shock.                Lactic Acid, Plasma [[id number]]  (Normal) Collected: 01/03/22 0824    Specimen: Blood Updated: 01/03/22 0858     Lactate 1.6 mmol/L     C-reactive Protein [[id number]]  (Abnormal) Collected: 01/03/22 0824    Specimen: Blood Updated: 01/03/22 0858     C-Reactive Protein 8.14 mg/dL     Comprehensive Metabolic Panel [[id number]]  (Abnormal) Collected: 01/03/22 0824    Specimen: Blood Updated: 01/03/22 0858     Glucose 153 mg/dL      BUN 20 mg/dL      Creatinine 1.48 mg/dL      Sodium 139 mmol/L      Potassium 4.1 mmol/L      Chloride 99 mmol/L      CO2 29.0 mmol/L      Calcium 8.5 mg/dL      Total Protein 6.9 g/dL      Albumin 3.70 g/dL      ALT (SGPT) 167 U/L      AST (SGOT) 94 U/L     "  Alkaline Phosphatase 137 U/L      Total Bilirubin 0.8 mg/dL      eGFR Non African Amer 49 mL/min/1.73      Globulin 3.2 gm/dL      A/G Ratio 1.2 g/dL      BUN/Creatinine Ratio 13.5     Anion Gap 11.0 mmol/L     Narrative:      GFR Normal >60  Chronic Kidney Disease <60  Kidney Failure <15      Protime-INR [[id number]]  (Normal) Collected: 01/03/22 0824    Specimen: Blood Updated: 01/03/22 0850     Protime 12.8 Seconds      INR 1.00    D-dimer, Quantitative [[id number]]  (Abnormal) Collected: 01/03/22 0824    Specimen: Blood Updated: 01/03/22 0850     D-Dimer, Quantitative 1.54 mg/L (FEU)     Narrative:      Reference Range is 0-0.50 mg/L FEU. However, results <0.50 mg/L FEU tends to rule out DVT or PE. Results >0.50 mg/L FEU are not useful in predicting absence or presence of DVT or PE.            XR Chest 1 View    Result Date: 1/3/2022  Narrative: EXAM: XR CHEST 1 VW-  INDICATION: fever  COMPARISON: None available.  FINDINGS:  Cardiac silhouette is normal size. No pleural effusion or pneumothorax. Patchy bilateral airspace opacity with peripheral predominance. No acute osseous finding.      Impression:  Patchy bilateral airspace opacity, likely representing Covid 19 pneumonia. This report was finalized on 01/03/2022 08:42 by Dr. Piyush Carpio MD.    CT Angiogram Chest    Result Date: 1/3/2022  Narrative: EXAM: CT chest angiography with 3D MIP images with IV contrast  INDICATION: hypoxia, elevated dimer, covid+  COMPARISON: None available.  DOSE LENGTH PRODUCT: 395 mGy cm. Automated exposure control was also utilized to decrease patient radiation dose.  FINDINGS:  No evidence of pulmonary embolus. No evidence of right heart strain. The main pulmonary artery is nondilated. Thoracic aorta is normal in caliber. No pericardial effusion.  Fairly extensive bilateral groundglass opacities with areas of confluent consolidation. The central airways are clear. Small bilateral pleural effusions. Enlarged mediastinal and hilar  lymph nodes are likely reactive.  The thyroid is uniform. No acute soft tissue finding. Limited view of the upper abdomen reveals no acute findings. No acute osseus finding.      Impression:  1.  No evidence of pulmonary embolus. 2.  Fairly extensive bilateral groundglass opacities and scattered areas of consolidation. Findings are in keeping with Covid 19 pneumonia. This report was finalized on 01/03/2022 11:00 by Dr. Piyush Carpio MD.     I have personally reviewed and interpreted the radiology studies and ECG obtained at time of admission.     Assessment / Plan      Assessment & Plan  Active Hospital Problems    Diagnosis    • **Pneumonia due to COVID-19 virus    • Acute respiratory failure with hypoxia (HCC)        PLAN:   Admit to the medical surgical floor  Dexamethasone 6 mg p.o. daily x10 days  Infectious diseases consultation for consideration of tocilizumab/baricitinib  Oxygen supplementation as needed  Adjunctive treatments with vitamin C, vitamin D, Pepcid, melatonin, zinc and thiamine    Code Status:   Full code     I discussed the patient's findings and my recommendations with: The patient    Estimated length of stay: Unknown    Electronically signed by Geoffrey Newsome DO, 01/03/22, 18:42 CST.

## 2022-01-04 NOTE — PLAN OF CARE
Goal Outcome Evaluation:  Plan of Care Reviewed With: patient        Progress: no change  Outcome Summary: On 7L highflow. Pt had temp today, Tylenol given. Denies pain during shift. IV ABX cont. Getting 3500 on IS. Safety maintained, COVID precautions cont.

## 2022-01-04 NOTE — PROGRESS NOTES
[facility]  INPATIENT PROGRESS NOTE    Length of Stay: 1  Date of Admission: 1/3/2022  Primary Care Physician: Provider, No Known    Subjective     Chief Complaint:     Shortness of breath    HPI     The patient states that he feels stronger today.  His appetite remains good.  His oxygen requirement is increased to 5 L.  CRP is slightly higher at 11.09.  Ferritin is slightly higher at 689.3.  MRSA nasal screen is positive and mupirocin nasal ointment has been started.  He has an incentive spirometry at the bedside that he broke by actually blowing into it rather than inhaling.  Urine culture shows no growth.  Blood cultures x2 showed no growth as well.  Glucose 151, creatinine 1.28.  CBC unremarkable except lymphopenia.    Review of Systems     All pertinent negatives and positives are as above. All other systems have been reviewed and are negative unless otherwise stated.     Objective    Temp:  [98.2 °F (36.8 °C)-101.5 °F (38.6 °C)] 99.8 °F (37.7 °C)  Heart Rate:  [] 98  Resp:  [16-26] 16  BP: (148-178)/(70-80) 168/74    Lab Results (last 24 hours)     Procedure Component Value Units Date/Time    Urine Culture - Urine, Urine, Clean Catch [[id number]]  (Normal) Collected: 01/03/22 1316    Specimen: Urine, Clean Catch Updated: 01/04/22 1300     Urine Culture No growth    Blood Culture - Blood, Arm, Right [[id number]]  (Normal) Collected: 01/03/22 0825    Specimen: Blood from Arm, Right Updated: 01/04/22 0946     Blood Culture No growth at 24 hours    Blood Culture - Blood, Arm, Left [[id number]]  (Normal) Collected: 01/03/22 0824    Specimen: Blood from Arm, Left Updated: 01/04/22 0946     Blood Culture No growth at 24 hours    Comprehensive Metabolic Panel [[id number]]  (Abnormal) Collected: 01/04/22 0639    Specimen: Blood Updated: 01/04/22 0736     Glucose 151 mg/dL      BUN 22 mg/dL      Creatinine 1.28 mg/dL      Sodium 139 mmol/L      Potassium 4.5  mmol/L      Chloride 102 mmol/L      CO2 27.0 mmol/L      Calcium 8.1 mg/dL      Total Protein 6.7 g/dL      Albumin 3.20 g/dL      ALT (SGPT) 105 U/L      AST (SGOT) 47 U/L      Alkaline Phosphatase 117 U/L      Total Bilirubin 0.6 mg/dL      eGFR Non African Amer 58 mL/min/1.73      Globulin 3.5 gm/dL      A/G Ratio 0.9 g/dL      BUN/Creatinine Ratio 17.2     Anion Gap 10.0 mmol/L     Narrative:      GFR Normal >60  Chronic Kidney Disease <60  Kidney Failure <15      Ferritin [[id number]]  (Abnormal) Collected: 01/04/22 0639    Specimen: Blood Updated: 01/04/22 0732     Ferritin 689.30 ng/mL     Narrative:      Results may be falsely decreased if patient taking Biotin.      C-reactive Protein [[id number]]  (Abnormal) Collected: 01/04/22 0639    Specimen: Blood Updated: 01/04/22 0730     C-Reactive Protein 11.09 mg/dL     CBC & Differential [[id number]]  (Abnormal) Collected: 01/04/22 0639    Specimen: Blood Updated: 01/04/22 0711    Narrative:      The following orders were created for panel order CBC & Differential.  Procedure                               Abnormality         Status                     ---------                               -----------         ------                     CBC Auto Differential[[id number]]        Abnormal            Final result                 Please view results for these tests on the individual orders.    CBC Auto Differential [[id number]]  (Abnormal) Collected: 01/04/22 0639    Specimen: Blood Updated: 01/04/22 0711     WBC 8.68 10*3/mm3      RBC 4.80 10*6/mm3      Hemoglobin 14.6 g/dL      Hematocrit 45.1 %      MCV 94.0 fL      MCH 30.4 pg      MCHC 32.4 g/dL      RDW 13.2 %      RDW-SD 45.7 fl      MPV 10.1 fL      Platelets 209 10*3/mm3      Neutrophil % 87.1 %      Lymphocyte % 6.5 %      Monocyte % 4.6 %      Eosinophil % 0.0 %      Basophil % 0.1 %      Neutrophils, Absolute 7.56 10*3/mm3      Lymphocytes, Absolute 0.56 10*3/mm3      Monocytes, Absolute 0.40 10*3/mm3       Eosinophils, Absolute 0.00 10*3/mm3      Basophils, Absolute 0.01 10*3/mm3     MRSA Screen, PCR (Inpatient) - Swab, Nares [[id number]]  (Abnormal) Collected: 01/03/22 2046    Specimen: Swab from Nares Updated: 01/03/22 2258     MRSA PCR MRSA Detected    S. Pneumo Ag Urine or CSF - Urine, Urine, Clean Catch [[id number]]  (Normal) Collected: 01/03/22 2045    Specimen: Urine, Clean Catch Updated: 01/03/22 2221     Strep Pneumo Ag Negative    Legionella Antigen, Urine - Urine, Urine, Clean Catch [[id number]]  (Normal) Collected: 01/03/22 2045    Specimen: Urine, Clean Catch Updated: 01/03/22 2221     LEGIONELLA ANTIGEN, URINE Negative          Imaging Results (Last 24 Hours)     ** No results found for the last 24 hours. **             Intake/Output Summary (Last 24 hours) at 1/4/2022 1709  Last data filed at 1/4/2022 1605  Gross per 24 hour   Intake 240 ml   Output 1400 ml   Net -1160 ml       Physical Exam  Constitutional:       General: He is not in acute distress.     Appearance: Normal appearance. He is normal weight.   HENT:      Head: Normocephalic and atraumatic.      Nose: Nose normal.      Mouth: Mucous membranes are moist.      Pharynx: Oropharynx is clear.    Eyes:      Conjunctiva/sclera: Conjunctivae normal.   Cardiovascular:      Rate and Rhythm: Normal rate and regular rhythm.      Pulses: Normal pulses.      Heart sounds: Normal heart sounds.   Pulmonary:      Effort: Pulmonary effort is normal.      Breath sounds: Normal breath sounds. No wheezing, rhonchi or rales.   Abdominal:      General: Abdomen is flat. Bowel sounds are normal. There is no distension.      Palpations: Abdomen is soft.      Tenderness: There is no abdominal tenderness.   Musculoskeletal:         General: No tenderness. Normal range of motion.      Right lower leg: No edema.      Left lower leg: No edema.   Skin:     General: Skin is warm and dry. .      Findings: No rash.   Neurological:      General: No focal deficit present.       Mental Status: He is alert and oriented to person, place, and time. Mental status is at baseline.    Psychiatric:         Mood and Affect: Mood normal.         Behavior: Behavior normal.         Thought Content: Thought content normal.     Results Review:  I have reviewed the labs, radiology results, and diagnostic studies since my last progress note and made treatment changes reflective of the results.   I have reviewed the current medications.    Assessment/Plan     Active Hospital Problems    Diagnosis    • **Pneumonia due to COVID-19 virus    • Acute respiratory failure with hypoxia (HCC)        PLAN:  Continue dexamethasone day #2 of 10  Continue oxygen supplementation  Continue adjunctive treatments  Continue Rocephin and azithromycin IV for presumptive superimposed bacterial infection    Electronically signed by Geoffrey Newsome DO, 01/04/22, 17:09 CST.

## 2022-01-04 NOTE — PAYOR COMM NOTE
"1/4/22  [facility] [phone]  [phone]    PATIENT ADMITTED ON 1/3/21 INPATIENT ADMISSION FROM ER.    [facility] FOR INPATIENT REVIEW.          Shant Arevalo (61 y.o. Male)             Date of Birth Social Security Number Address Home Phone MRN    1960  [id number]  [place] [address] [phone] [id number]    [employer]ondo[employer] Marital Status             Other        Admission Date Admission Type Admitting Provider Attending Provider Department, Room/Bed    1/3/22 Emergency Geoffrey Newsome DO Robinson, Maurice S, DO [facility] 4B, 444/1    Discharge Date Discharge Disposition Discharge Destination                         Attending Provider: Geoffrey Newsome DO    Allergies: No Known Allergies    Isolation: Enh Drop/Con   Infection: COVID (confirmed) (01/03/22), MRSA (01/03/22)   Code Status: CPR   Advance Care Planning Activity    Ht: 182.9 cm (72\")   Wt: 86.2 kg (190 lb)    Admission Cmt: None   Principal Problem: Pneumonia due to COVID-19 virus [U07.1,J12.82]                 Active Insurance as of 1/3/2022     Primary Coverage     Payor Plan Insurance Group Employer/Plan Group    [employer] [facility] [employer] [id number]     Payor Plan Address Payor Plan Phone Number Payor Plan Fax Number Effective Dates    [address] [phone]  7/1/2021 - None Entered    [facility] [id number]       Subscriber Name Subscriber Birth Date Member ID       SHANT AREVALO 1960 [id number]                 Emergency Contacts      (Rel.) Home Phone Work Phone Mobile Phone    Mary Jane Arevaol (Spouse) [phone] -- --        AM [facility] 4B [id number]     Luis Manuel Justice MD   Physician   Emergency Medicine   ED Provider Notes      Signed   Date of Service:  01/03/22 0827   Creation Time:  01/03/22 0827              Signed        Expand AllCollapse All          Show:Clear all  [x]Manual[x]Template[]Copied    Added by:  [x]Luis Manuel Justice " MD Devyn      []Marion for details       Subjective      61-year-old male presents to the ER with complaint of persistent fever.  He has a history of hyperlipidemia, diagnosed with Covid at onset of his fever.  Wife is currently in the hospital being treated for Covid.  Patient has not been vaccinated.  Has been having intermittent fevers off and on for the past 2 weeks, brief relief with Tylenol and ibuprofen.  Patient states he is here today because he continues to have fever despite avelina Covid 2 weeks ago.  Has been on Augmentin prescribed by his PCP.  Has had some mild lumbar back pain, no dysuria or hematuria, no frequency urgency.  No abdominal pain, vomiting or diarrhea.  Rates his symptoms as moderate severity.        History provided by:  Patient        Review of Systems   All other systems reviewed and are negative.        Medical History        Past Medical History:   Diagnosis Date   • Allergic     • Anxiety 8/24/2017   • Hyperlipidemia     • Hypogonadism in male     • Pneumonia due to COVID-19 virus 1/3/2022                       Physical Exam  Vitals and nursing note reviewed.   Constitutional:       General: He is not in acute distress.     Appearance: Normal appearance. He is normal weight.   HENT:      Head: Normocephalic and atraumatic.      Nose: Nose normal.      Mouth/Throat:      Mouth: Mucous membranes are moist.      Pharynx: Oropharynx is clear. No oropharyngeal exudate or posterior oropharyngeal erythema.   Eyes:      Extraocular Movements: Extraocular movements intact.      Conjunctiva/sclera: Conjunctivae normal.      Pupils: Pupils are equal, round, and reactive to light.   Cardiovascular:      Rate and Rhythm: Normal rate and regular rhythm.      Pulses: Normal pulses.      Heart sounds: Normal heart sounds.   Pulmonary:      Effort: Pulmonary effort is normal.      Breath sounds: Normal breath sounds. No wheezing, rhonchi or rales.   Abdominal:      General: Abdomen is flat.  Bowel sounds are normal. There is no distension.      Palpations: Abdomen is soft.      Tenderness: There is no abdominal tenderness.   Musculoskeletal:         General: No tenderness. Normal range of motion.      Cervical back: Normal range of motion and neck supple. No rigidity. No muscular tenderness.      Right lower leg: No edema.      Left lower leg: No edema.   Skin:     General: Skin is warm and dry.      Capillary Refill: Capillary refill takes less than 2 seconds.      Findings: No rash.   Neurological:      General: No focal deficit present.      Mental Status: He is alert and oriented to person, place, and time. Mental status is at baseline.      Cranial Nerves: No cranial nerve deficit.      Sensory: No sensory deficit.      Motor: No weakness.   Psychiatric:         Mood and Affect: Mood normal.         Behavior: Behavior normal.         Thought Content: Thought content normal.        - Blood, Arm, Right [[id number]] Collected: 01/03/22 0825      Specimen: Blood from Arm, Right Updated: 01/03/22 0946     COVID-19 and FLU A/B PCR - Swab, Nasopharynx [[id number]]  (Abnormal) Collected: 01/03/22 0827     Specimen: Swab from Nasopharynx Updated: 01/03/22 0907       COVID19 Detected       Influenza A PCR Not Detected       Influenza B PCR Not Detected     Narrative:        Department Encounter #   1/3/2022  7:58 AM [address] [id number]     Geoffrey Newsome DO   Physician   Medicine   H&P      Signed   Date of Service:  01/03/22 1842   Creation Time:  01/03/22 1842              Signed        Expand AllCollapse All            Show:Clear all  [x]Manual[x]Template[x]Copied    Added by:  [x]Geoffrey Newsome DO      []Marion for details         [facility]  HISTORY AND PHYSICAL     Date of Admission: 1/3/2022  Primary Care Physician: Provider, No Known     Subjective      Chief Complaint:   Shortness of breath     History of Present Illness     This  61-year-old male presents to the emergency department with a complaint of a persistent fever.  The patient's wife is currently being treated for Covid.  He has not been vaccinated for Covid.  He notes symptom onset about 2 weeks ago with associated intermittent fevers off and on.  He presented because he was short of breath and had been continued to have a fever despite exposure 2 weeks ago.  His PCP has prescribed him Augmentin on an outpatient basis without resolution of symptoms.  The patient's symptom onset is outside the window of treatment for remdesivir.     Work-up in the emergency department reveals a normal procalcitonin.  Urinalysis is unremarkable except trace of bacteria and 6-12 WBCs.  ABG show pH 7.487 with PO2 54 on 2 L per nasal cannula.  Covid swab is positive.  Influenza AMB are negative.  Metabolic panel reveals creatinine 1.48 with  and AST 94.  D-dimer elevated at 1.54.  INR, lactate, procalcitonin within normal limits.  CBC is unremarkable except lymphopenia.  Ferritin elevated at 652.4 and CRP elevated at 8.14.     Review of Systems   Constitutional: Positive for fever.   HENT: Positive for sinus pressure and sinus pain.    Eyes: Negative.    Respiratory: Negative.    Cardiovascular: Negative.    Gastrointestinal: Negative.    Endocrine: Negative.    Genitourinary: Negative.    Musculoskeletal: Positive for back pain.   Skin: Negative.    Allergic/Immunologic: Negative.    Neurological: Negative.    Hematological: Negative.    Psychiatric/Behavioral: Negative.       Otherwise complete ROS reviewed and negative except as mentioned in the HPI.     Past Medical History:     Medical History        Past Medical History:   Diagnosis Date   • Allergic     • Anxiety 8/24/2017   • Environmental and seasonal allergies 9/28/2017   • ETD (eustachian tube dysfunction) 9/28/2017   • Hyperlipidemia     • Hypogonadism in male     • Pneumonia due to COVID-19 virus 1/3/2022            Past Surgical  History:  Surgical History         Past Surgical History:   Procedure Laterality Date   • SHOULDER ACROMIOPLASTY WITH ROTATOR CUFF REPAIR       • SINUS SURGERY         x5                     Heart sounds: Normal heart sounds.   Pulmonary:      Effort: Pulmonary effort is normal.      Breath sounds: Normal breath sounds. No wheezing, rhonchi or rales.   Abdominal:      General: Abdomen is flat. Bowel sounds are normal. There is no distension.      Palpations: Abdomen is soft.      Tenderness: There is no abdominal tenderness.   Musculoskeletal:         General: No tenderness. Normal range of motion.      Cervical back: Normal range of motion and neck supple. No rigidity. No muscular tenderness.      Right lower leg: No edema.      Left lower leg: No edema.   Skin:     General: Skin is warm and dry.      Capillary Refill: Capillary refill takes less than 2 seconds.      Findings: No rash.   Neurological:      General: No focal deficit present.      Mental Status: He is alert and oriented to person, place, and time. Mental status is at baseline.      Cranial Nerves: No cranial nerve deficit.      Sensory: No sensory deficit.      Motor: No weakness.   Psychiatric:         Mood and Affect: Mood normal.         Behavior: Behavior normal.         Thought Content: Thought content normal.      Results Reviewed:           Lab Results (last 24 hours)      Procedure Component Value Units Date/Time     Procalcitonin [[id number]]  (Normal) Collected: 01/03/22 1353     Specimen: Blood Updated: 01/03/22 1437       Procalcitonin 0.16 ng/mL       Narrative:       As a Marker for Sepsis (Non-Neonates):      1. <0.5 ng/mL represents a low risk of severe sepsis and/or septic shock.  2. >2 ng/mL represents a high risk of severe sepsis and/or septic shock.     As a Marker for Lower Respiratory Tract Infections that require antibiotic therapy:  PCT on Admission     Antibiotic Therapy             6-12 Hrs later  >0.5                          " Strongly Recommended            >0.25 - <0.5             Recommended  0.1 - 0.25                  Discouraged                       Remeasure/reassess PCT  <0.1                         Strongly Discouraged         Remeasure/reassess PCT       As 28 day mortality risk marker: \"Change in Procalcitonin Result\" (>80% or <=80%) if Day 0 (or Day 1) and Day 4 values are available. Refer to http://www.Green[facility]rapOnline[facility] of hospitals in the United StatesAlarm.compct-calculator.com/     Change in PCT <=80 %   A decrease of PCT levels below or equal to 80% defines a positive change in PCT test result representing a higher risk for 28-day all-cause mortality of patients diagnosed with severe sepsis or septic shock.     Change in PCT >80 %   A decrease of PCT levels of more than 80% defines a negative change in PCT result representing a lower risk for 28-day all-cause mortality of patients diagnosed with severe sepsis or septic shock.                       Ferritin [[id number]]  (Abnormal) Collected: 01/03/22 0824     Specimen: Blood Updated: 01/03/22 1347       Ferritin 652.40 ng/mL       Narrative:       Results may be falsely decreased if patient taking Biotin.        Urinalysis, Microscopic Only - Urine, Clean Catch [[id number]]  (Abnormal) Collected: 01/03/22 1316     Specimen: Urine, Clean Catch Updated: 01/03/22 1344       RBC, UA None Seen /HPF         WBC, UA 6-12 /HPF         Bacteria, UA Trace /HPF         Squamous Epithelial Cells, UA None Seen /HPF         Hyaline Casts, UA None Seen /LPF         Methodology Manual Light Microscopy     Urinalysis With Culture If Indicated - Urine, Clean Catch [[id number]]  (Abnormal) Collected: 01/03/22 1316     Specimen: Urine, Clean Catch Updated: 01/03/22 1344       Color, UA Dark Yellow       Appearance, UA Clear       pH, UA 6.0       Specific Gravity, UA >1.030       Glucose, UA Negative       Ketones, UA Trace       Bilirubin, UA Small (1+)       Blood, UA Negative       Protein,  mg/dL (2+)       Leuk Esterase, UA Trace     "   Nitrite, UA Negative       Urobilinogen, UA 2.0 E.U./dL     Urine Culture - Urine, Urine, Clean Catch [[id number]] Collected: 01/03/22 1316     Specimen: Urine, Clean Catch Updated: 01/03/22 1344     Blood Culture - Blood, Arm, Right [[id number]] Collected: 01/03/22 0825     Specimen: Blood from Arm, Right Updated: 01/03/22 0946     Blood Culture - Blood, Arm, Left [[id number]] Collected: 01/03/22 0824     Specimen: Blood from Arm, Left Updated: 01/03/22 0945     Blood Gas, Arterial - [[id number]]  (Abnormal) Collected: 01/03/22 0943     Specimen: Arterial Blood Updated: 01/03/22 0941       Site Right Radial       Jeb's Test Positive       pH, Arterial 7.487 pH units         Comment: 83 Value above reference range          pCO2, Arterial 35.6 mm Hg         pO2, Arterial 54.0 mm Hg         Comment: 85 Value below critical limit          HCO3, Arterial 26.9 mmol/L         Comment: 83 Value above reference range          Base Excess, Arterial 3.7 mmol/L         Comment: 83 Value above reference range          O2 Saturation, Arterial 91.1 %         Comment: 84 Value below reference range          Temperature 37.0 C         Barometric Pressure for Blood Gas 762 mmHg         Modality Nasal Cannula       Flow Rate 2.0 lpm         Ventilator Mode NA       Notified Who DR MEDLEY       Notified By [id number]       Notified Time 01/03/2022 09:48       Collected by [id number]       Comment: Meter: [id number]     :  [id number]          pCO2, Temperature Corrected 35.6 mm Hg         pH, Temp Corrected 7.487 pH Units         pO2, Temperature Corrected 54.0 mm Hg       Manual Differential [[id number]]  (Abnormal) Collected: 01/03/22 0824     Specimen: Blood Updated: 01/03/22 0914       Neutrophil % 90.0 %         Lymphocyte % 4.0 %         Monocyte % 4.0 %         Atypical Lymphocyte % 2.0 %         Neutrophils Absolute 6.03 10*3/mm3         Lymphocytes Absolute 0.40 10*3/mm3         Monocytes Absolute 0.27 10*3/mm3         RBC  Morphology Normal       WBC Morphology Normal       Platelet Morphology Normal     CBC & Differential [[id number]]  (Normal) Collected: 01/03/22 0824     Specimen: Blood Updated: 01/03/22 0914     Narrative:       The following orders were created for panel order CBC & Differential.  Procedure                               Abnormality         Status                     ---------                               -----------         ------                     CBC Auto Differential[[id number]]        Normal              Final result                  Please view results for these tests on the individual orders.     CBC Auto Differential [[id number]]  (Normal) Collected: 01/03/22 0824     Specimen: Blood Updated: 01/03/22 0914       WBC 6.70 10*3/mm3         RBC 5.26 10*6/mm3         Hemoglobin 16.0 g/dL         Hematocrit 48.4 %         MCV 92.0 fL         MCH 30.4 pg         MCHC 33.1 g/dL         RDW 13.2 %         RDW-SD 45.2 fl         MPV 9.8 fL         Platelets 187 10*3/mm3       COVID-19 and FLU A/B PCR - Swab, Nasopharynx [[id number]]  (Abnormal) Collected: 01/03/22 0827     Specimen: Swab from Nasopharynx Updated: 01/03/22 0907       COVID19 Detected       Influenza A PCR Not Detected       Influenza B PCR Not Detected     Narrative:       Fact sheet for providers: https://www.fda.gov/media/806312/download     Fact sheet for patients: https://www.fda.gov/media/958860/download     Test performed by PCR.  Influenza A and Influenza B negative results should be considered presumptive in samples that have a positive SARS-CoV-2 result.     Competitive inhibition studies showed that SARS-CoV-2 virus, when present at concentrations above 3.6E+04 copies/mL, can inhibit the detection and amplification of influenza A and influenza B virus RNA if present at or below 1.8E+02 copies/mL or 4.9E+02 copies/mL, respectively, and may lead to false negative influenza virus results. If co-infection with influenza A or influenza B virus  "is suspected in samples with a positive SARS-CoV-2 result, the sample should be re-tested with another FDA cleared, approved, or authorized influenza test, if influenza virus detection would change clinical management.     Procalcitonin [[id number]]  (Normal) Collected: 01/03/22 0824     Specimen: Blood Updated: 01/03/22 0904       Procalcitonin 0.18 ng/mL       Narrative:       As a Marker for Sepsis (Non-Neonates):      1. <0.5 ng/mL represents a low risk of severe sepsis and/or septic shock.  2. >2 ng/mL represents a high risk of severe sepsis and/or septic shock.     As a Marker for Lower Respiratory Tract Infections that require antibiotic therapy:  PCT on Admission     Antibiotic Therapy             6-12 Hrs later  >0.5                          Strongly Recommended            >0.25 - <0.5             Recommended  0.1 - 0.25                  Discouraged                       Remeasure/reassess PCT  <0.1                         Strongly Discouraged         Remeasure/reassess PCT       As 28 day mortality risk marker: \"Change in Procalcitonin Result\" (>80% or <=80%) if Day 0 (or Day 1) and Day 4 values are available. Refer to http://www.[employer]boross-pct-calculator.com/     Change in PCT <=80 %   A decrease of PCT levels below or equal to 80% defines a positive change in PCT test result representing a higher risk for 28-day all-cause mortality of patients diagnosed with severe sepsis or septic shock.     Change in PCT >80 %   A decrease of PCT levels of more than 80% defines a negative change in PCT result representing a lower risk for 28-day all-cause mortality of patients diagnosed with severe sepsis or septic shock.                       Lactic Acid, Plasma [[id number]]  (Normal) Collected: 01/03/22 0824     Specimen: Blood Updated: 01/03/22 0858       Lactate 1.6 mmol/L       C-reactive Protein [[id number]]  (Abnormal) Collected: 01/03/22 0824     Specimen: Blood Updated: 01/03/22 0858       C-Reactive Protein 8.14 " mg/dL       Comprehensive Metabolic Panel [[id number]]  (Abnormal) Collected: 01/03/22 0824     Specimen: Blood Updated: 01/03/22 0858       Glucose 153 mg/dL         BUN 20 mg/dL         Creatinine 1.48 mg/dL         Sodium 139 mmol/L         Potassium 4.1 mmol/L         Chloride 99 mmol/L         CO2 29.0 mmol/L         Calcium 8.5 mg/dL         Total Protein 6.9 g/dL         Albumin 3.70 g/dL         ALT (SGPT) 167 U/L         AST (SGOT) 94 U/L         Alkaline Phosphatase 137 U/L         Total Bilirubin 0.8 mg/dL         eGFR Non African Amer 49 mL/min/1.73         Globulin 3.2 gm/dL         A/G Ratio 1.2 g/dL         BUN/Creatinine Ratio 13.5       Anion Gap 11.0 mmol/L       Narrative:       GFR Normal >60  Chronic Kidney Disease <60  Kidney Failure <15        Protime-INR [[id number]]  (Normal) Collected: 01/03/22 0824     Specimen: Blood Updated: 01/03/22 0850       Protime 12.8 Seconds         INR 1.00     D-dimer, Quantitative [[id number]]  (Abnormal) Collected: 01/03/22 0824     Specimen: Blood Updated: 01/03/22 0850       D-Dimer, Quantitative 1.54 mg/L (FEU)       Narrative:       Reference Range is 0-0.50 mg/L FEU. However, results <0.50 mg/L FEU tends to rule out DVT or PE. Results >0.50 mg/L FEU are not useful in predicting absence or presence of DVT or PE.                XR Chest 1 View     Result Date: 1/3/2022  Narrative: EXAM: XR CHEST 1 VW-  INDICATION: fever  COMPARISON: None available.  FINDINGS:  Cardiac silhouette is normal size. No pleural effusion or pneumothorax. Patchy bilateral airspace opacity with peripheral predominance. No acute osseous finding.       Impression:  Patchy bilateral airspace opacity, likely representing Covid 19 pneumonia. This report was finalized on 01/03/2022 08:42 by Dr. Piyush Carpio MD.     CT Angiogram Chest     Result Date: 1/3/2022  Narrative: EXAM: CT chest angiography with 3D MIP images with IV contrast  INDICATION: hypoxia, elevated dimer, covid+  COMPARISON:  None available.  DOSE LENGTH PRODUCT: 395 mGy cm. Automated exposure control was also utilized to decrease patient radiation dose.  FINDINGS:  No evidence of pulmonary embolus. No evidence of right heart strain. The main pulmonary artery is nondilated. Thoracic aorta is normal in caliber. No pericardial effusion.  Fairly extensive bilateral groundglass opacities with areas of confluent consolidation. The central airways are clear. Small bilateral pleural effusions. Enlarged mediastinal and hilar lymph nodes are likely reactive.  The thyroid is uniform. No acute soft tissue finding. Limited view of the upper abdomen reveals no acute findings. No acute osseus finding.       Impression:  1.  No evidence of pulmonary embolus. 2.  Fairly extensive bilateral groundglass opacities and scattered areas of consolidation. Findings are in keeping with Covid 19 pneumonia. This report was finalized on 01/03/2022 11:00 by Dr. Piyush Carpio MD.      I have personally reviewed and interpreted the radiology studies and ECG obtained at time of admission.      Assessment / Plan      Assessment & Plan       Active Hospital Problems     Diagnosis     • **Pneumonia due to COVID-19 virus     • Acute respiratory failure with hypoxia (HCC)           PLAN:   Admit to the medical surgical floor  Dexamethasone 6 mg p.o. daily x10 days  Infectious diseases consultation for consideration of tocilizumab/baricitinib  Oxygen supplementation as needed  Adjunctive treatments with vitamin C, vitamin D, Pepcid, melatonin, zinc and thiamine     Code Status:   Full code      I discussed the patient's findings and my recommendations with: The patient     Estimated length of stay: Unknown     Electronically signed by Geoffrey Newsome DO, 01/03/22, 18:42 CST.                                 ED to Hosp-Admission (Current) on 1/3/2022       Department Encounter #   1/3/2022  7:58 AM [facility] 4B [id number]     Marlon Garrett MD   Physician   Infectious  Disease   Consults      Signed   Date of Service:  01/03/22 1717   Creation Time:  01/03/22 1717           Consult Orders   Inpatient Infectious Diseases Consult [[id number]] ordered by Geoffrey Newsome DO at 01/03/22 1305          Signed                 Show:Clear all  [x]Manual[x]Template[x]Copied    Added by:  [x]Marlon Garrett MD      []Avilaver for details    INFECTIOUS DISEASES CONSULT NOTE     Patient:  Shant Arevalo 61 y.o. male  ROOM # 444/1  YOB: 1960  MRN: [id number]  CSN:    [id number]  Admit date: 1/3/2022   Admitting Physician: Geoffrey Newsome DO  Primary Care Physician: Provider, No Known  REFERRING PROVIDER: Provider, No Known     Reason for Consultation: Consideration of tocilizumab and baricitinib                   History of Present Illness/Chief Complaint: 61-year-old man. He indicates his wife was diagnosed with Covid little more than 2 weeks ago. He indicates he has been running temperature elevations for about the past 10 days. He indicates some intermittent chills. Does not report significant productive cough or sputum production. He presented to the hospital. He was admitted today. He is requiring high flow supplemental oxygen at 10 L. Current oxygen saturations 95%. He has had a maximum temperature elevation earlier today of 101.8. He is not having diarrhea. Reports no headache. No neurological symptoms. No leg pain or swelling. I checked with the pharmacy. We have no tocilizumab available at present. Unclear when we will have additional shipment for administration. Infectious disease asked to evaluate and offer recommendations.  He has not had COVID-19 vaccination.     Current       CT chest 1/3/2022 personally reviewed:  IMPRESSION:  1.  No evidence of pulmonary embolus.  2.  Fairly extensive bilateral groundglass opacities and scattered areas  of consolidation. Findings are in keeping with Covid 19 pneumonia.  This report was finalized on 01/03/2022 11:00 by   Piyush Carpio MD.    Vital Signs (last day)     Date/Time Temp Temp src Pulse Resp BP Patient Position SpO2    01/04/22 0822 -- -- -- -- -- -- 96    01/04/22 0700 98.3 (36.8) Oral 91 16 148/78 Lying 98    01/04/22 0634 -- -- 81 16 -- -- 92    01/04/22 0415 100.1 (37.8) Oral -- -- 156/70 -- --    01/04/22 0412 -- -- 79 16 -- Lying 94    01/04/22 0052 -- -- 84 16 -- Lying 95    01/04/22 0004 98.2 (36.8) Oral 86 20 160/80 Lying 95    01/03/22 2115 -- -- 93 20 -- Sitting 94    01/03/22 2057 99.1 (37.3) Oral 90 26 148/74 Lying 93    01/03/22 1714 101 (38.3) Oral 103 18 172/80 Lying 95    01/03/22 1507 -- -- -- -- -- -- 96    01/03/22 1328 100.4 (38) Oral 103 18 160/78 Lying 97    01/03/22 1246 -- -- 98 -- 128/75 -- --    01/03/22 12:33:03 98.8 (37.1) Oral -- -- -- -- --    01/03/22 1032 -- -- 98 -- 115/68 -- 97    01/03/22 1017 -- -- 96 -- 112/67 -- 97    01/03/22 1002 -- -- 104 -- 112/66 -- 95    01/03/22 0944 -- -- -- -- -- -- 95    01/03/22 0941 -- -- 110 -- -- -- 91    01/03/22 0931 -- -- -- -- 132/78 -- --    01/03/22 09:28:19 101.8 (38.8) Oral -- -- -- -- --    01/03/22 09:28:06 -- -- -- -- -- -- 87    01/03/22 0925 -- -- 108 -- 132/79 -- 87    01/03/22 0755 100.8 (38.2) Oral 121 20 141/68 Sitting 93          Current Facility-Administered Medications   Medication Dose Route Frequency Provider Last Rate Last Admin   • acetaminophen (TYLENOL) tablet 650 mg  650 mg Oral Q4H PRN Geoffrey Newsome DO   650 mg at 01/04/22 0413   • aluminum-magnesium hydroxide-simethicone (MAALOX MAX) 400-400-40 MG/5ML suspension 15 mL  15 mL Oral Q6H PRN Geoffrey Newsome DO       • ascorbic acid (VITAMIN C) tablet 1,000 mg  1,000 mg Oral Daily Geoffrey Newsome DO   1,000 mg at 01/04/22 0818   • azithromycin 500 mg IVPB in 250 mL NS  500 mg Intravenous Q24H Marlon Garrett MD   500 mg at 01/03/22 2115   • sennosides-docusate (PERICOLACE) 8.6-50 MG per tablet 2 tablet  2 tablet Oral BID PRN Geoffrey Newsome DO        And    • polyethylene glycol (MIRALAX) packet 17 g  17 g Oral Daily PRN Geoffrey Newsome DO        And   • bisacodyl (DULCOLAX) EC tablet 5 mg  5 mg Oral Daily PRN Geoffrey Newsome DO        And   • bisacodyl (DULCOLAX) suppository 10 mg  10 mg Rectal Daily PRN Geoffrey Newsome DO       • busPIRone (BUSPAR) tablet 5 mg  5 mg Oral BID Geoffrey Newsome DO       • cefTRIAXone (ROCEPHIN) 2 g/100 mL 0.9% NS IVPB (MBP)  2 g Intravenous Q24H Marlon Garrett MD   2 g at 01/03/22 2031   • cetirizine (zyrTEC) tablet 10 mg  10 mg Oral Daily Geoffrey Newsome DO   10 mg at 01/04/22 0819   • cholecalciferol (VITAMIN D3) tablet 1,000 Units  1,000 Units Oral Daily Geoffrey Newsome DO   1,000 Units at 01/04/22 0819   • dexamethasone (DECADRON) tablet 6 mg  6 mg Oral Daily With Breakfast Geoffrey Newsome DO   6 mg at 01/04/22 0818   • enoxaparin (LOVENOX) syringe 40 mg  40 mg Subcutaneous Q24H Geoffrey Newsome DO   40 mg at 01/03/22 1453   • famotidine (PEPCID) tablet 20 mg  20 mg Oral BID AC Geoffrey Newsome DO   20 mg at 01/04/22 0819   • FLUoxetine (PROzac) capsule 10 mg  10 mg Oral Daily Geoffrey Newsome DO       • fluticasone (FLONASE) 50 MCG/ACT nasal spray 1 spray  1 spray Each Nare BID Geoffrey Newsome DO   1 spray at 01/03/22 2101   • melatonin tablet 6 mg  6 mg Oral Nightly Geoffrey Newsome DO   6 mg at 01/03/22 2102   • mupirocin (BACTROBAN) 2 % nasal ointment   Each Nare BID Juvenal Rollins MD   1 application at 01/04/22 0819   • ondansetron (ZOFRAN) injection 4 mg  4 mg Intravenous Q6H PRN Geoffrey Newsome DO       • oxybutynin XL (DITROPAN-XL) 24 hr tablet 10 mg  10 mg Oral Daily Geoffrey Newsome,        • QUEtiapine fumarate ER (SEROquel XR) tablet 50 mg  50 mg Oral Nightly Geoffrey Newsome DO   50 mg at 01/03/22 2101   • sodium chloride 0.9 % flush 10 mL  10 mL Intravenous PRN Geoffrey Newsome,        • sodium chloride 0.9 % flush 10 mL  10 mL  Intravenous Q12H Geoffrey Newsome DO   10 mL at 01/03/22 2103   • tamsulosin (FLOMAX) 24 hr capsule 0.4 mg  0.4 mg Oral Daily Geoffrey Newsome DO       • thiamine (VITAMIN B-1) tablet 100 mg  100 mg Oral Daily Geoffrey Newsome DO   100 mg at 01/04/22 0819   • zinc sulfate (ZINCATE) capsule 220 mg  220 mg Oral Daily Geoffrey Newsome DO   220 mg at 01/04/22 0819

## 2022-01-05 PROCEDURE — 25010000002 CEFTRIAXONE PER 250 MG: Performed by: INTERNAL MEDICINE

## 2022-01-05 PROCEDURE — 25010000002 ENOXAPARIN PER 10 MG: Performed by: FAMILY MEDICINE

## 2022-01-05 PROCEDURE — 63710000001 DEXAMETHASONE PER 0.25 MG: Performed by: FAMILY MEDICINE

## 2022-01-05 PROCEDURE — 25010000002 AZITHROMYCIN PER 500 MG: Performed by: INTERNAL MEDICINE

## 2022-01-05 RX ORDER — GUAIFENESIN 600 MG/1
1200 TABLET, EXTENDED RELEASE ORAL EVERY 12 HOURS SCHEDULED
Status: DISCONTINUED | OUTPATIENT
Start: 2022-01-05 | End: 2022-01-10 | Stop reason: HOSPADM

## 2022-01-05 RX ADMIN — GUAIFENESIN 1200 MG: 600 TABLET, EXTENDED RELEASE ORAL at 20:42

## 2022-01-05 RX ADMIN — ACETAMINOPHEN 650 MG: 325 TABLET ORAL at 02:03

## 2022-01-05 RX ADMIN — AZITHROMYCIN MONOHYDRATE 500 MG: 500 INJECTION, POWDER, LYOPHILIZED, FOR SOLUTION INTRAVENOUS at 20:41

## 2022-01-05 RX ADMIN — Medication 1000 UNITS: at 08:49

## 2022-01-05 RX ADMIN — DEXAMETHASONE 6 MG: 4 TABLET ORAL at 08:49

## 2022-01-05 RX ADMIN — ZINC SULFATE 220 MG (50 MG) CAPSULE 220 MG: CAPSULE at 08:49

## 2022-01-05 RX ADMIN — THIAMINE HCL TAB 100 MG 100 MG: 100 TAB at 08:49

## 2022-01-05 RX ADMIN — FLUTICASONE PROPIONATE 1 SPRAY: 50 SPRAY, METERED NASAL at 08:50

## 2022-01-05 RX ADMIN — Medication 6 MG: at 20:42

## 2022-01-05 RX ADMIN — OXYCODONE HYDROCHLORIDE AND ACETAMINOPHEN 1000 MG: 500 TABLET ORAL at 08:49

## 2022-01-05 RX ADMIN — FAMOTIDINE 20 MG: 20 TABLET, FILM COATED ORAL at 08:49

## 2022-01-05 RX ADMIN — QUETIAPINE FUMARATE 50 MG: 50 TABLET, EXTENDED RELEASE ORAL at 20:41

## 2022-01-05 RX ADMIN — ACETAMINOPHEN 650 MG: 325 TABLET ORAL at 10:48

## 2022-01-05 RX ADMIN — Medication 1 APPLICATION: at 08:49

## 2022-01-05 RX ADMIN — CEFTRIAXONE SODIUM 2 G: 2 INJECTION, POWDER, FOR SOLUTION INTRAMUSCULAR; INTRAVENOUS at 19:37

## 2022-01-05 RX ADMIN — FLUTICASONE PROPIONATE 1 SPRAY: 50 SPRAY, METERED NASAL at 20:41

## 2022-01-05 RX ADMIN — Medication 1 APPLICATION: at 20:41

## 2022-01-05 RX ADMIN — CETIRIZINE HYDROCHLORIDE 10 MG: 10 TABLET ORAL at 08:49

## 2022-01-05 RX ADMIN — ENOXAPARIN SODIUM 40 MG: 40 INJECTION SUBCUTANEOUS at 17:09

## 2022-01-05 RX ADMIN — FAMOTIDINE 20 MG: 20 TABLET, FILM COATED ORAL at 17:09

## 2022-01-05 RX ADMIN — GUAIFENESIN 1200 MG: 600 TABLET, EXTENDED RELEASE ORAL at 14:46

## 2022-01-05 RX ADMIN — SODIUM CHLORIDE, PRESERVATIVE FREE 10 ML: 5 INJECTION INTRAVENOUS at 20:42

## 2022-01-05 RX ADMIN — ACETAMINOPHEN 650 MG: 325 TABLET ORAL at 18:17

## 2022-01-05 NOTE — PLAN OF CARE
Goal Outcome Evaluation:              Outcome Summary: No c/o pain. Recurring fever, relieved with tylenol. However, his temperature rises each time the tylenol starts to wear off. SpO2 low to mid 90s on 4L cannula. Sinus/tach .

## 2022-01-05 NOTE — PLAN OF CARE
Problem: Adult Inpatient Plan of Care  Goal: Plan of Care Review  Outcome: Ongoing, Progressing  Flowsheets (Taken 1/5/2022 1537)  Progress: no change  Plan of Care Reviewed With: patient  Outcome Summary: Fever episode midday today of 102.1, PO tylenol alleviaed temp post administration. C/O congestion, mucinex 1200mg added on BID, per provider note patient is on day 3/10 for dexamethasone treatment. Shower performed today as well. Patient still on 4L NC at this time   Goal Outcome Evaluation:  Plan of Care Reviewed With: patient        Progress: no change  Outcome Summary: Fever episode midday today of 102.1, PO tylenol alleviaed temp post administration. C/O congestion, mucinex 1200mg added on BID, per provider note patient is on day 3/10 for dexamethasone treatment. Shower performed today as well. Patient still on 4L NC at this time

## 2022-01-05 NOTE — PROGRESS NOTES
"Infectious Diseases Progress Note    Patient:  Shant Arevalo  YOB: 1960  MRN: [id number]   Admit date: 1/3/2022   Admitting Physician: Geoffrey Newsome DO  Primary Care Physician: Provider, No Known    Chief Complaint/Interval History: He had temperature elevation earlier today.  He is not having productive cough.  He has some mild dry cough.  Feels breathing is stable.  He is without nausea or diarrhea.  No new complaints.  Oxygen requirements appear stable to slightly better from yesterday.    Intake/Output Summary (Last 24 hours) at 1/5/2022 1221  Last data filed at 1/5/2022 1100  Gross per 24 hour   Intake 240 ml   Output 1175 ml   Net -935 ml     Allergies: No Known Allergies  Current Scheduled Medications:   ascorbic acid, 1,000 mg, Oral, Daily  azithromycin, 500 mg, Intravenous, Q24H  busPIRone, 5 mg, Oral, BID  cefTRIAXone, 2 g, Intravenous, Q24H  cetirizine, 10 mg, Oral, Daily  cholecalciferol, 1,000 Units, Oral, Daily  dexamethasone, 6 mg, Oral, Daily With Breakfast  enoxaparin, 40 mg, Subcutaneous, Q24H  famotidine, 20 mg, Oral, BID AC  FLUoxetine, 10 mg, Oral, Daily  fluticasone, 1 spray, Each Nare, BID  melatonin, 6 mg, Oral, Nightly  mupirocin, , Each Nare, BID  oxybutynin XL, 10 mg, Oral, Daily  QUEtiapine fumarate ER, 50 mg, Oral, Nightly  sodium chloride, 10 mL, Intravenous, Q12H  tamsulosin, 0.4 mg, Oral, Daily  thiamine, 100 mg, Oral, Daily  zinc sulfate, 220 mg, Oral, Daily      Current PRN Medications:  •  acetaminophen  •  aluminum-magnesium hydroxide-simethicone  •  senna-docusate sodium **AND** polyethylene glycol **AND** bisacodyl **AND** bisacodyl  •  ondansetron  •  [COMPLETED] Insert peripheral IV **AND** sodium chloride    Review of Systems see HPI    Vital Signs:  /98 (BP Location: Right arm, Patient Position: Lying) Comment: reported to nurse  Pulse 103   Temp (!) 100.9 °F (38.3 °C) (Oral)   Resp 18   Ht 182.9 cm (72\")   Wt 86.2 kg (190 lb)   SpO2 90%   " BMI 25.77 kg/m²     Physical Exam  Vital signs - reviewed.  Line/IV site - No erythema, warmth, induration, or tenderness.  Lungs without crackles or wheezes  Extremities without edema  Abdomen soft nontender  General he looks a little bit stronger today in comparison to evaluation yesterday    Lab Results:  CBC:   Results from last 7 days   Lab Units 01/04/22  0639 01/03/22  0824   WBC 10*3/mm3 8.68 6.70   HEMOGLOBIN g/dL 14.6 16.0   HEMATOCRIT % 45.1 48.4   PLATELETS 10*3/mm3 209 187     BMP:  Results from last 7 days   Lab Units 01/04/22  0639 01/03/22  0824 01/03/22  0824   SODIUM mmol/L 139  --  139   POTASSIUM mmol/L 4.5  --  4.1   CHLORIDE mmol/L 102  --  99   CO2 mmol/L 27.0  --  29.0   BUN mg/dL 22*  --  20   CREATININE mg/dL 1.28*  --  1.48*   GLUCOSE mg/dL 151*   < > 153*   CALCIUM mg/dL 8.1*  --  8.5*   ALT (SGPT) U/L 105*  --  167*    < > = values in this interval not displayed.     CRP 11.1  Pneumococcal antigen negative  Urine Legionella negative  MRSA screen positive  Ferritin 689    Culture Results:   Blood Culture   Date Value Ref Range Status   01/03/2022 No growth at 2 days  Preliminary   01/03/2022 No growth at 2 days  Preliminary     Urine Culture   Date Value Ref Range Status   01/03/2022 No growth  Final     Radiology: None  Additional Studies Reviewed: None    Impression:   1.  COVID-19 infection/COVID-19 pneumonia  2.  Respiratory failure secondary COVID-19  3.  Possible secondary bacterial infection  4.  Hepatic transaminitis-likely related to Covid.  Trending down.    Recommendations:   Continue dexamethasone  Continue prophylactic anticoagulation  Monitor CRP, oxygen requirements, clinical course  If appears to show worsening will consider baricitinib or tocilizumab if available (has not been available as of yet  Continue ceftriaxone and azithromycin  Continue to follow    Marlon Reyes MD

## 2022-01-05 NOTE — PROGRESS NOTES
[facility] Medicine Services  INPATIENT PROGRESS NOTE    Length of Stay: 2  Date of Admission: 1/3/2022  Primary Care Physician: Provider, No Known    Subjective     Chief Complaint:     Shortness of breath    HPI     Patient has been having intermittent febrile episodes.  Maximum temperature of 102.1 over the past 24 hours.  Oxygen requirement has decreased to 4 L.  He was at 94% saturation when I was in the room with him.  Blood cultures at 2 days show no growth.  He continues on empiric antibiotics for superimposed pneumonia.    Review of Systems     All pertinent negatives and positives are as above. All other systems have been reviewed and are negative unless otherwise stated.     Objective    Temp:  [98.6 °F (37 °C)-102.1 °F (38.9 °C)] 100.9 °F (38.3 °C)  Heart Rate:  [] 103  Resp:  [16-22] 18  BP: (130-180)/(72-98) 180/98    Lab Results (last 24 hours)     Procedure Component Value Units Date/Time    Blood Culture - Blood, Arm, Right [[id number]]  (Normal) Collected: 01/03/22 0825    Specimen: Blood from Arm, Right Updated: 01/05/22 0946     Blood Culture No growth at 2 days    Blood Culture - Blood, Arm, Left [[id number]]  (Normal) Collected: 01/03/22 0824    Specimen: Blood from Arm, Left Updated: 01/05/22 0946     Blood Culture No growth at 2 days          Imaging Results (Last 24 Hours)     ** No results found for the last 24 hours. **             Intake/Output Summary (Last 24 hours) at 1/5/2022 1412  Last data filed at 1/5/2022 1100  Gross per 24 hour   Intake 240 ml   Output 1175 ml   Net -935 ml       Physical Exam  Constitutional:       General: He is not in acute distress.     Appearance: Normal appearance. He is normal weight.   HENT:      Head: Normocephalic and atraumatic.      Nose: Nose normal.      Mouth: Mucous membranes are moist.      Pharynx: Oropharynx is clear.    Eyes:      Conjunctiva/sclera: Conjunctivae normal.   Cardiovascular:      Rate and  Rhythm: Normal rate and regular rhythm.      Pulses: Normal pulses.      Heart sounds: Normal heart sounds.   Pulmonary:      Effort: Pulmonary effort is normal.      Breath sounds: Normal breath sounds. No wheezing, rhonchi or rales.   Abdominal:      General: Abdomen is flat. Bowel sounds are normal. There is no distension.      Palpations: Abdomen is soft.      Tenderness: There is no abdominal tenderness.   Musculoskeletal:         General: No tenderness. Normal range of motion.      Right lower leg: No edema.      Left lower leg: No edema.   Skin:     General: Skin is warm and dry. .      Findings: No rash.   Neurological:      General: No focal deficit present.      Mental Status: He is alert and oriented to person, place, and time. Mental status is at baseline.    Psychiatric:         Mood and Affect: Mood normal.         Behavior: Behavior normal.         Thought Content: Thought content normal.     Results Review:  I have reviewed the labs, radiology results, and diagnostic studies since my last progress note and made treatment changes reflective of the results.   I have reviewed the current medications.    Assessment/Plan     Active Hospital Problems    Diagnosis    • **Pneumonia due to COVID-19 virus    • Acute respiratory failure with hypoxia (HCC)        PLAN:  Continue dexamethasone day #3 of 10  Continue adjunctive treatments  Continue empiric antibiotics per infectious diseases    Electronically signed by Geoffrey Newsome DO, 01/05/22, 14:12 CST.

## 2022-01-05 NOTE — CASE MANAGEMENT/SOCIAL WORK
Discharge Planning Assessment  [facility]     Patient Name: Shant Arevalo  MRN: [id number]  Today's Date: 1/5/2022    Admit Date: 1/3/2022     Discharge Needs Assessment     Row Name 01/05/22 1143       Living Environment    Lives With spouse    Name(s) of Who Lives With Patient Mary Jane    Current Living Arrangements home/apartment/condo    Primary Care Provided by self    Provides Primary Care For no one    Family Caregiver if Needed child(mackenzie), adult    Quality of Family Relationships helpful; involved; supportive    Able to Return to Prior Arrangements yes       Resource/Environmental Concerns    Resource/Environmental Concerns none       Transition Planning    Patient/Family Anticipates Transition to home    Patient/Family Anticipated Services at Transition none    Transportation Anticipated family or friend will provide       Discharge Needs Assessment    Readmission Within the Last 30 Days no previous admission in last 30 days    Equipment Currently Used at Home none    Concerns to be Addressed no discharge needs identified    Anticipated Changes Related to Illness none    Equipment Needed After Discharge none    Discharge Coordination/Progress Pt resides with spouse, Mary Jane.  However, Mary Jane is also admitted to [facility] for COVID as well.  Pt plans to return home and denies any dc needs.  Pt states he goes to [facility] when he is sick and can afford medications.  SW will follow.               Discharge Plan    No documentation.               Continued Care and Services - Admitted Since 1/3/2022    Coordination has not been started for this encounter.          Demographic Summary    No documentation.                Functional Status    No documentation.                Psychosocial    No documentation.                Abuse/Neglect    No documentation.                Legal    No documentation.                Substance Abuse    No documentation.                Patient Forms    No documentation.                   Keren  KARLI Luna

## 2022-01-06 LAB
ALBUMIN SERPL-MCNC: 3.2 G/DL (ref 3.5–5.2)
ALBUMIN/GLOB SERPL: 0.9 G/DL
ALP SERPL-CCNC: 131 U/L (ref 39–117)
ALT SERPL W P-5'-P-CCNC: 128 U/L (ref 1–41)
ANION GAP SERPL CALCULATED.3IONS-SCNC: 8 MMOL/L (ref 5–15)
AST SERPL-CCNC: 64 U/L (ref 1–40)
BASOPHILS # BLD AUTO: 0.03 10*3/MM3 (ref 0–0.2)
BASOPHILS NFR BLD AUTO: 0.3 % (ref 0–1.5)
BILIRUB SERPL-MCNC: 0.5 MG/DL (ref 0–1.2)
BUN SERPL-MCNC: 25 MG/DL (ref 6–20)
BUN/CREAT SERPL: 20.3 (ref 7–25)
CALCIUM SPEC-SCNC: 8.5 MG/DL (ref 8.6–10.5)
CHLORIDE SERPL-SCNC: 102 MMOL/L (ref 98–107)
CO2 SERPL-SCNC: 28 MMOL/L (ref 22–29)
CREAT SERPL-MCNC: 1.23 MG/DL (ref 0.76–1.27)
CRP SERPL-MCNC: 3.43 MG/DL (ref 0–0.5)
DEPRECATED RDW RBC AUTO: 44.8 FL (ref 37–54)
EOSINOPHIL # BLD AUTO: 0 10*3/MM3 (ref 0–0.4)
EOSINOPHIL NFR BLD AUTO: 0 % (ref 0.3–6.2)
ERYTHROCYTE [DISTWIDTH] IN BLOOD BY AUTOMATED COUNT: 12.9 % (ref 12.3–15.4)
FERRITIN SERPL-MCNC: 876 NG/ML (ref 30–400)
GFR SERPL CREATININE-BSD FRML MDRD: 60 ML/MIN/1.73
GLOBULIN UR ELPH-MCNC: 3.4 GM/DL
GLUCOSE SERPL-MCNC: 130 MG/DL (ref 65–99)
HCT VFR BLD AUTO: 45.3 % (ref 37.5–51)
HGB BLD-MCNC: 15.2 G/DL (ref 13–17.7)
IMM GRANULOCYTES # BLD AUTO: 0.14 10*3/MM3 (ref 0–0.05)
IMM GRANULOCYTES NFR BLD AUTO: 1.5 % (ref 0–0.5)
LYMPHOCYTES # BLD AUTO: 0.76 10*3/MM3 (ref 0.7–3.1)
LYMPHOCYTES NFR BLD AUTO: 8.4 % (ref 19.6–45.3)
MCH RBC QN AUTO: 31.4 PG (ref 26.6–33)
MCHC RBC AUTO-ENTMCNC: 33.6 G/DL (ref 31.5–35.7)
MCV RBC AUTO: 93.6 FL (ref 79–97)
MONOCYTES # BLD AUTO: 0.63 10*3/MM3 (ref 0.1–0.9)
MONOCYTES NFR BLD AUTO: 6.9 % (ref 5–12)
NEUTROPHILS NFR BLD AUTO: 7.52 10*3/MM3 (ref 1.7–7)
NEUTROPHILS NFR BLD AUTO: 82.9 % (ref 42.7–76)
NRBC BLD AUTO-RTO: 0 /100 WBC (ref 0–0.2)
PLATELET # BLD AUTO: 288 10*3/MM3 (ref 140–450)
PMV BLD AUTO: 9.9 FL (ref 6–12)
POTASSIUM SERPL-SCNC: 4.9 MMOL/L (ref 3.5–5.2)
PROT SERPL-MCNC: 6.6 G/DL (ref 6–8.5)
RBC # BLD AUTO: 4.84 10*6/MM3 (ref 4.14–5.8)
SODIUM SERPL-SCNC: 138 MMOL/L (ref 136–145)
WBC NRBC COR # BLD: 9.08 10*3/MM3 (ref 3.4–10.8)

## 2022-01-06 PROCEDURE — 80053 COMPREHEN METABOLIC PANEL: CPT | Performed by: FAMILY MEDICINE

## 2022-01-06 PROCEDURE — 85025 COMPLETE CBC W/AUTO DIFF WBC: CPT | Performed by: FAMILY MEDICINE

## 2022-01-06 PROCEDURE — 25010000002 AZITHROMYCIN PER 500 MG: Performed by: INTERNAL MEDICINE

## 2022-01-06 PROCEDURE — 25010000002 CEFTRIAXONE PER 250 MG: Performed by: INTERNAL MEDICINE

## 2022-01-06 PROCEDURE — 94799 UNLISTED PULMONARY SVC/PX: CPT

## 2022-01-06 PROCEDURE — 86140 C-REACTIVE PROTEIN: CPT | Performed by: FAMILY MEDICINE

## 2022-01-06 PROCEDURE — 63710000001 DEXAMETHASONE PER 0.25 MG: Performed by: FAMILY MEDICINE

## 2022-01-06 PROCEDURE — 82728 ASSAY OF FERRITIN: CPT | Performed by: FAMILY MEDICINE

## 2022-01-06 PROCEDURE — 25010000002 ENOXAPARIN PER 10 MG: Performed by: FAMILY MEDICINE

## 2022-01-06 RX ADMIN — Medication 1 APPLICATION: at 21:19

## 2022-01-06 RX ADMIN — GUAIFENESIN 1200 MG: 600 TABLET, EXTENDED RELEASE ORAL at 08:21

## 2022-01-06 RX ADMIN — FAMOTIDINE 20 MG: 20 TABLET, FILM COATED ORAL at 17:30

## 2022-01-06 RX ADMIN — Medication 1000 UNITS: at 08:21

## 2022-01-06 RX ADMIN — ENOXAPARIN SODIUM 40 MG: 40 INJECTION SUBCUTANEOUS at 17:30

## 2022-01-06 RX ADMIN — THIAMINE HCL TAB 100 MG 100 MG: 100 TAB at 08:21

## 2022-01-06 RX ADMIN — SODIUM CHLORIDE, PRESERVATIVE FREE 10 ML: 5 INJECTION INTRAVENOUS at 21:18

## 2022-01-06 RX ADMIN — FLUTICASONE PROPIONATE 1 SPRAY: 50 SPRAY, METERED NASAL at 08:26

## 2022-01-06 RX ADMIN — OXYCODONE HYDROCHLORIDE AND ACETAMINOPHEN 1000 MG: 500 TABLET ORAL at 08:21

## 2022-01-06 RX ADMIN — ACETAMINOPHEN 650 MG: 325 TABLET ORAL at 12:40

## 2022-01-06 RX ADMIN — Medication 6 MG: at 21:19

## 2022-01-06 RX ADMIN — FLUTICASONE PROPIONATE 1 SPRAY: 50 SPRAY, METERED NASAL at 21:22

## 2022-01-06 RX ADMIN — Medication 1 APPLICATION: at 08:21

## 2022-01-06 RX ADMIN — DOCUSATE SODIUM 50 MG AND SENNOSIDES 8.6 MG 2 TABLET: 8.6; 5 TABLET, FILM COATED ORAL at 09:40

## 2022-01-06 RX ADMIN — FAMOTIDINE 20 MG: 20 TABLET, FILM COATED ORAL at 08:21

## 2022-01-06 RX ADMIN — CEFTRIAXONE SODIUM 2 G: 2 INJECTION, POWDER, FOR SOLUTION INTRAMUSCULAR; INTRAVENOUS at 21:18

## 2022-01-06 RX ADMIN — GUAIFENESIN 1200 MG: 600 TABLET, EXTENDED RELEASE ORAL at 21:19

## 2022-01-06 RX ADMIN — ACETAMINOPHEN 650 MG: 325 TABLET ORAL at 21:19

## 2022-01-06 RX ADMIN — QUETIAPINE FUMARATE 50 MG: 50 TABLET, EXTENDED RELEASE ORAL at 21:19

## 2022-01-06 RX ADMIN — AZITHROMYCIN MONOHYDRATE 500 MG: 500 INJECTION, POWDER, LYOPHILIZED, FOR SOLUTION INTRAVENOUS at 21:18

## 2022-01-06 RX ADMIN — BUSPIRONE HYDROCHLORIDE 5 MG: 5 TABLET ORAL at 21:19

## 2022-01-06 RX ADMIN — ZINC SULFATE 220 MG (50 MG) CAPSULE 220 MG: CAPSULE at 08:21

## 2022-01-06 RX ADMIN — CETIRIZINE HYDROCHLORIDE 10 MG: 10 TABLET ORAL at 08:21

## 2022-01-06 RX ADMIN — DEXAMETHASONE 6 MG: 4 TABLET ORAL at 08:21

## 2022-01-06 RX ADMIN — SODIUM CHLORIDE, PRESERVATIVE FREE 10 ML: 5 INJECTION INTRAVENOUS at 08:25

## 2022-01-06 NOTE — PAYOR COMM NOTE
"1/6/22 [facility] [phone]  [phone]    STILL IN HOUSE. FAXING UPDATE CLINICAL FOR CONT STAY.            Shant Arevalo (61 y.o. Male)             Date of Birth Social Security Number Address Home Phone MRN    1960  [address] [place] [address] [phone] [id number]    [employer] Marital Status             Other        Admission Date Admission Type Admitting Provider Attending Provider Department, Room/Bed    1/3/22 Emergency Geoffrey Newsome DO Robinson, Maurice S, DO [address], 444/1    Discharge Date Discharge Disposition Discharge Destination                         Attending Provider: Geoffrey Newsome DO    Allergies: No Known Allergies    Isolation: Enh Drop/Con   Infection: COVID (confirmed) (01/03/22), MRSA (01/03/22)   Code Status: CPR   Advance Care Planning Activity    Ht: 182.9 cm (72\")   Wt: 86.2 kg (190 lb)    Admission Cmt: None   Principal Problem: Pneumonia due to COVID-19 virus [U07.1,J12.82]                 Active Insurance as of 1/3/2022     Primary Coverage     Payor Plan Insurance Group Employer/Plan Group    [employer] [facility] [id number]     Payor Plan Address Payor Plan Phone Number Payor Plan Fax Number Effective Dates    [address] [phone]  7/1/2021 - None Entered    [facility] [id number]       Subscriber Name Subscriber Birth Date Member ID       SHANT AREVALO 1960 [id number]                 Emergency Contacts      (Rel.) Home Phone Work Phone Mobile Phone    Mary Jane Arevalo (Spouse) [phone] -- --              [facility] Encounter Date/Time: 1/3/2022 [facility]8   Hospital Account: [id number]    MRN: [id number]   Patient:  Shant Arevalo   Contact Serial #: [id number]   SSN:          ENCOUNTER             Patient Class: Inpatient   Unit: [address] Service: Medicine     Bed: 444/1   Admitting Provider: Geoffrey Newsome DO   Referring " Physician: Provider, No Known   Attending Provider: Geoffrey Newsome DO   Adm Diagnosis: Pneumonia due to COVID-1*               PATIENT          Name: Shant Morejon : 1960 (61 yrs)   Address: [facility] Sex: Male   City: [place] [address]   County: [facility]   Marital Status:  Ethnicity: NOT                                                                              Race: WHITE   Primary Care Provider: Provider, No Known Patients Phone: Home Phone: [phone]     Mobile Phone: [phone]   EMERGENCY CONTACT   Contact Name Legal Guardian? Relationship to Patient Home Phone Work Phone   1. Mary Jane Morejon  2. *No Contact Specified*      Spouse    [phone]              GUARANTOR            Guarantor: Shant Morejon     : 1960   Address:  [address] Sex: Male     [place] [address]     Relation to Patient: Self       Home Phone: [phone]   Guarantor ID: [id number]       Work Phone:     GUARANTOR EMPLOYER   Employer:           Status: RETIRED   COVERAGE          PRIMARY INSURANCE   Payor: [employer] Plan: [employer] EM[employer]EE   Group Number: [id number] Insurance Type: INDEMNITY   Subscriber Name: SHANT MOREJON Subscriber : 1960   Subscriber ID: [id number] Coverage Address: [id number]  [place] [address]   Pat. Rel. to Subscriber: Self Coverage Phone: [phone]   SECONDARY INSURANCE   Payor: N/A Plan: N/A   Group Number:   Insurance Type:     Subscriber Name:   Subscriber :     Subscriber ID:   Coverage Address:     Pat. Rel. to Subscriber:   Coverage Phone:        Contact Serial # ([id number])  `       2022    Chart ID ([id number])            Information    Encounter Information    Department Encounter #   1/3/2022  7:58 AM  PAD 4B [id number]     Geoffrey Newsome DO   Physician   Medicine   Progress Notes      Signed   Date of Service:  22   Creation Time:  22              Signed                 Show:Clear  all  [x]Manual[x]Template[x]Copied    Added by:  [x]Geoffrey Newsome DO      []Marion for details         [facility] Medicine Services  INPATIENT PROGRESS NOTE     Length of Stay: 3  Date of Admission: 1/3/2022  Primary Care Physician: Provider, No Known     Subjective      Chief Complaint:      Shortness of breath     HPI      The patient has no new complaints today.  Maximum temperature over the past 24 hours has been 101.2 degrees.  He is currently on 5 L per nasal cannula.  Inflammatory markers continue to decline with CRP 3.43 and ferritin 876 today.  LFTs have improved as well.  Albumin 3.20.  CBC is unremarkable except lymphopenia.  The patient continues to prone and to lie on his side as directed.  He has noted some increased dyspnea with exertion but that has not led to an increased oxygen requirement during exertion.     Review of Systems      All pertinent negatives and positives are as above. All other systems have been reviewed and are negative unless otherwise stated.      Objective    Temp:  [98.1 °F (36.7 °C)-101.2 °F (38.4 °C)] 100.3 °F (37.9 °C)  Heart Rate:  [77-98] 93  Resp:  [16-22] 20  BP: (118-158)/(64-94) 158/84                 growth at 3 days        Comprehensive Metabolic Panel [[id number]]  (Abnormal) Collected: 01/06/22 0436     Specimen: Blood Updated: 01/06/22 0531       Glucose 130 mg/dL         BUN 25 mg/dL         Creatinine 1.23 mg/dL         Sodium 138 mmol/L         Potassium 4.9 mmol/L         Chloride 102 mmol/L         CO2 28.0 mmol/L         Calcium 8.5 mg/dL         Total Protein 6.6 g/dL         Albumin 3.20 g/dL         ALT (SGPT) 128 U/L         AST (SGOT) 64 U/L         Alkaline Phosphatase 131 U/L         Total Bilirubin 0.5 mg/dL         eGFR Non African Amer 60 mL/min/1.73         Globulin 3.4 gm/dL         A/G Ratio 0.9 g/dL         BUN/Creatinine Ratio 20.3       Anion Gap 8.0 mmol/L       Narrative:       GFR Normal >60  Chronic  Kidney Disease <60  Kidney Failure <15        C-reactive Protein [[id number]]  (Abnormal) Collected: 01/06/22 0436     Specimen: Blood Updated: 01/06/22 0531       C-Reactive Protein 3.43 mg/dL       Ferritin [[id number]]  (Abnormal) Collected: 01/06/22 0436     Specimen: Blood Updated: 01/06/22 0531       Ferritin 876.00 ng/mL       Narrative:       Results may be falsely decreased if patient taking Biotin.        CBC & Differential [[id number]]  (Abnormal) Collected: 01/06/22 0436     Specimen: Blood Updated: 01/06/22 0507     Narrative:       The following orders were created for panel order CBC & Differential.  Procedure                               Abnormality         Status                     ---------                               -----------         ------                     CBC Auto Differential[[id number]]        Abnormal            Final result                  Please view results for these tests on the individual orders.     CBC Auto Differential [[id number]]  (Abnormal) Collected: 01/06/22 0436     Specimen: Blood Updated: 01/06/22 0507       WBC 9.08 10*3/mm3         RBC 4.84 10*6/mm3         Hemoglobin 15.2 g/dL         Hematocrit 45.3 %         MCV 93.6 fL         MCH 31.4 pg         MCHC 33.6 g/dL         RDW 12.9 %         RDW-SD 44.8 fl         MPV 9.9 fL         Platelets 288 10*3/mm3         Neutrophil % 82.9 %         Lymphocyte % 8.4 %         Monocyte % 6.9 %         Eosinophil % 0.0 %         Basophil % 0.3 %         Immature Grans % 1.5 %         Neutrophils, Absolute 7.52 10*3/mm3         Lymphocytes, Absolute 0.76 10*3/mm3         Monocytes, Absolute 0.63 10*3/mm3         Eosinophils, Absolute 0.00 10*3/mm3         Basophils, Absolute 0.03 10*3/mm3         Immature Grans, Absolute 0.14 10*3/mm3         nRBC 0.0 /100 WBC                   Imaging Results (Last 24 Hours)      ** No results found for the last 24 hours. **                Intake/Output Summary (Last 24 hours) at 1/6/2022  1208  Last data filed at 1/6/2022 1135      Gross per 24 hour   Intake 480 ml   Output 900 ml   Net -420 ml         Physical Exam  Constitutional:       General: He is not in acute distress.     Appearance: Normal appearance. He is normal weight.   HENT:      Head: Normocephalic and atraumatic.      Nose: Nose normal.      Mouth: Mucous membranes are moist.      Pharynx: Oropharynx is clear.    Eyes:      Conjunctiva/sclera: Conjunctivae normal.   Cardiovascular:      Rate and Rhythm: Normal rate and regular rhythm.      Pulses: Normal pulses.      Heart sounds: Normal heart sounds.   Pulmonary:      Effort: Pulmonary effort is normal.      Breath sounds: Normal breath sounds. No wheezing, rhonchi or rales.   Abdominal:      General: Abdomen is flat. Bowel sounds are normal. There is no distension.      Palpations: Abdomen is soft.      Tenderness: There is no abdominal tenderness.   Musculoskeletal:         General: No tenderness. Normal range of motion.      Right lower leg: No edema.      Left lower leg: No edema.   Skin:     General: Skin is warm and dry. .      Findings: No rash.   Neurological:      General: No focal deficit present.      Mental Status: He is alert and oriented to person, place, and time. Mental status is at baseline.    Psychiatric:         Mood and Affect: Mood normal.         Behavior: Behavior normal.         Thought Content: Thought content normal.      Results Review:  I have reviewed the labs, radiology results, and diagnostic studies since my last progress note and made treatment changes reflective of the results.   I have reviewed the current medications.     Assessment/Plan           Active Hospital Problems     Diagnosis     • **Pneumonia due to COVID-19 virus     • Acute respiratory failure with hypoxia (HCC)           PLAN:  Continue dexamethasone day #4 of 10  Continue adjunctive treatments  Continue empiric antibiotics per infectious diseases     Electronically signed by Geoffrey  ANGE Newsome DO, 01/06/22, 12:08 CST.                  ED to Hosp-Admission (Current) on 1/3/2022     ED to Hosp-Admission (Current) on 1/3/2022        Detailed Report      Date/Time Temp Pulse Resp BP Patient Position Device (Oxygen Therapy) Flow (L/min) SpO2   01/06/22 1240 100 (37.8) -- -- -- -- -- -- --   01/06/22 1135 100.3 (37.9)  93 20 158/84 -- nasal cannula 5 92   01/06/22 0831 98.1 (36.7) 89 20 118/76 Sitting nasal cannula 5 92   01/06/22 0820 -- 98 22 -- -- nasal cannula 4  86 Abnormal     01/06/22 0605 -- -- -- -- -- nasal cannula 4 92   01/06/22 0500 99.3 (37.4)  89 16 142/86 Lying nasal cannula 4 91   01/06/22 0032 98.2 (36.8) 77 18 120/64 Lying nasal cannula 4 92   01/05/22 1945 -- -- -- -- -- nasal cannula 4 --   01/05/22 1941 99.5 (37.5) 89 20 144/80 Lying nasal cannula 4 91   01/05/22 1819 -- -- -- -- -- nasal cannula 4 91   01/05/22 1700 101.2 (38.4) Abnormal  -- -- -- -- -- -- --   01/05/22 1500 98.7 (37.1) 95 18 154/94 Sitting nasal cannula 4 93   01/05/22 1203 100.9 (38.3) Abnormal  103 -- -- -- nasal cannula 4 90   01/05/22 1100 101.4 (38.6) Abnormal   102 18 180/98  Lying nasal cannula 4 90   01/05/22 0900 102.1 (38.9) Abnormal  -- -- -- -- -- -- --   01/05/22 0700 98.6 (37) 96 18 156/92 Lying nasal cannula 4 90   01/05/22 0456 98.9 (37.2) 86 18 130/80 Lying nasal cannula 4 93   01/05/22 0125                   Current Facility-Administered Medications   Medication Dose Route Frequency Provider Last Rate Last Admin   • acetaminophen (TYLENOL) tablet 650 mg  650 mg Oral Q4H PRN Geoffrey Newsome DO   650 mg at 01/06/22 1240   • aluminum-magnesium hydroxide-simethicone (MAALOX MAX) 400-400-40 MG/5ML suspension 15 mL  15 mL Oral Q6H PRN Geoffrey Newsome DO       • ascorbic acid (VITAMIN C) tablet 1,000 mg  1,000 mg Oral Daily Geoffrey Newsome DO   1,000 mg at 01/06/22 0821   • azithromycin 500 mg IVPB in 250 mL NS  500 mg Intravenous Q24H Marlon Garrett MD   500 mg at 01/05/22 2041    • sennosides-docusate (PERICOLACE) 8.6-50 MG per tablet 2 tablet  2 tablet Oral BID PRN Geoffrey Newsome DO   2 tablet at 01/06/22 0940    And   • polyethylene glycol (MIRALAX) packet 17 g  17 g Oral Daily PRN Geoffrey Newsome DO        And   • bisacodyl (DULCOLAX) EC tablet 5 mg  5 mg Oral Daily PRN Geoffrey Newsome DO        And   • bisacodyl (DULCOLAX) suppository 10 mg  10 mg Rectal Daily PRN Geoffrey Newsome DO       • busPIRone (BUSPAR) tablet 5 mg  5 mg Oral BID Geoffrey Newsome DO       • cefTRIAXone (ROCEPHIN) 2 g/100 mL 0.9% NS IVPB (MBP)  2 g Intravenous Q24H Marlon Garrett MD   2 g at 01/05/22 1937   • cetirizine (zyrTEC) tablet 10 mg  10 mg Oral Daily Geoffrey Newsome DO   10 mg at 01/06/22 0821   • cholecalciferol (VITAMIN D3) tablet 1,000 Units  1,000 Units Oral Daily Geoffrey Newsome DO   1,000 Units at 01/06/22 0821   • dexamethasone (DECADRON) tablet 6 mg  6 mg Oral Daily With Breakfast Geoffrey Newsome DO   6 mg at 01/06/22 0821   • enoxaparin (LOVENOX) syringe 40 mg  40 mg Subcutaneous Q24H Geoffrey Newsome DO   40 mg at 01/05/22 1709   • famotidine (PEPCID) tablet 20 mg  20 mg Oral BID AC Geoffrey Newsome DO   20 mg at 01/06/22 0821   • FLUoxetine (PROzac) capsule 10 mg  10 mg Oral Daily Geoffrey Newsome DO       • fluticasone (FLONASE) 50 MCG/ACT nasal spray 1 spray  1 spray Each Nare BID Geoffrey Newsome DO   1 spray at 01/06/22 0826   • guaiFENesin (MUCINEX) 12 hr tablet 1,200 mg  1,200 mg Oral Q12H Geoffrey Newsome DO   1,200 mg at 01/06/22 0821   • melatonin tablet 6 mg  6 mg Oral Nightly Geoffrey Newsome DO   6 mg at 01/05/22 2042   • mupirocin (BACTROBAN) 2 % nasal ointment   Each Nare BID Juvenal Rollins MD   1 application at 01/06/22 0821   • ondansetron (ZOFRAN) injection 4 mg  4 mg Intravenous Q6H PRN Geoffrey Newsome DO       • oxybutynin XL (DITROPAN-XL) 24 hr tablet 10 mg  10 mg Oral Daily Geoffrey Newsome,  DO       • QUEtiapine fumarate ER (SEROquel XR) tablet 50 mg  50 mg Oral Nightly Geoffrey Newsome DO   50 mg at 01/05/22 2041   • sodium chloride 0.9 % flush 10 mL  10 mL Intravenous PRN Geoffrey Newsome DO       • sodium chloride 0.9 % flush 10 mL  10 mL Intravenous Q12H Geoffrey Newsome DO   10 mL at 01/06/22 0825   • tamsulosin (FLOMAX) 24 hr capsule 0.4 mg  0.4 mg Oral Daily Geoffrey Newsome DO       • thiamine (VITAMIN B-1) tablet 100 mg  100 mg Oral Daily Geoffrey Newsome DO   100 mg at 01/06/22 0821   • zinc sulfate (ZINCATE) capsule 220 mg  220 mg Oral Daily Geoffrey Newsome DO   220 mg at 01/06/22 0821

## 2022-01-06 NOTE — PROGRESS NOTES
[facility] Medicine Services  INPATIENT PROGRESS NOTE    Length of Stay: 3  Date of Admission: 1/3/2022  Primary Care Physician: Provider, No Known    Subjective     Chief Complaint:     Shortness of breath    HPI     The patient has no new complaints today.  Maximum temperature over the past 24 hours has been 101.2 degrees.  He is currently on 5 L per nasal cannula.  Inflammatory markers continue to decline with CRP 3.43 and ferritin 876 today.  LFTs have improved as well.  Albumin 3.20.  CBC is unremarkable except lymphopenia.  The patient continues to prone and to lie on his side as directed.  He has noted some increased dyspnea with exertion but that has not led to an increased oxygen requirement during exertion.    Review of Systems     All pertinent negatives and positives are as above. All other systems have been reviewed and are negative unless otherwise stated.     Objective    Temp:  [98.1 °F (36.7 °C)-101.2 °F (38.4 °C)] 100.3 °F (37.9 °C)  Heart Rate:  [77-98] 93  Resp:  [16-22] 20  BP: (118-158)/(64-94) 158/84    Lab Results (last 24 hours)     Procedure Component Value Units Date/Time    Blood Culture - Blood, Arm, Right [[id number]]  (Normal) Collected: 01/03/22 0825    Specimen: Blood from Arm, Right Updated: 01/06/22 1000     Blood Culture No growth at 3 days    Blood Culture - Blood, Arm, Left [[id number]]  (Normal) Collected: 01/03/22 0824    Specimen: Blood from Arm, Left Updated: 01/06/22 1000     Blood Culture No growth at 3 days    Comprehensive Metabolic Panel [[id number]]  (Abnormal) Collected: 01/06/22 0436    Specimen: Blood Updated: 01/06/22 0531     Glucose 130 mg/dL      BUN 25 mg/dL      Creatinine 1.23 mg/dL      Sodium 138 mmol/L      Potassium 4.9 mmol/L      Chloride 102 mmol/L      CO2 28.0 mmol/L      Calcium 8.5 mg/dL      Total Protein 6.6 g/dL      Albumin 3.20 g/dL      ALT (SGPT) 128 U/L      AST (SGOT) 64 U/L      Alkaline Phosphatase 131  U/L      Total Bilirubin 0.5 mg/dL      eGFR Non African Amer 60 mL/min/1.73      Globulin 3.4 gm/dL      A/G Ratio 0.9 g/dL      BUN/Creatinine Ratio 20.3     Anion Gap 8.0 mmol/L     Narrative:      GFR Normal >60  Chronic Kidney Disease <60  Kidney Failure <15      C-reactive Protein [[id number]]  (Abnormal) Collected: 01/06/22 0436    Specimen: Blood Updated: 01/06/22 0531     C-Reactive Protein 3.43 mg/dL     Ferritin [[id number]]  (Abnormal) Collected: 01/06/22 0436    Specimen: Blood Updated: 01/06/22 0531     Ferritin 876.00 ng/mL     Narrative:      Results may be falsely decreased if patient taking Biotin.      CBC & Differential [[id number]]  (Abnormal) Collected: 01/06/22 0436    Specimen: Blood Updated: 01/06/22 0507    Narrative:      The following orders were created for panel order CBC & Differential.  Procedure                               Abnormality         Status                     ---------                               -----------         ------                     CBC Auto Differential[[id number]]        Abnormal            Final result                 Please view results for these tests on the individual orders.    CBC Auto Differential [[id number]]  (Abnormal) Collected: 01/06/22 0436    Specimen: Blood Updated: 01/06/22 0507     WBC 9.08 10*3/mm3      RBC 4.84 10*6/mm3      Hemoglobin 15.2 g/dL      Hematocrit 45.3 %      MCV 93.6 fL      MCH 31.4 pg      MCHC 33.6 g/dL      RDW 12.9 %      RDW-SD 44.8 fl      MPV 9.9 fL      Platelets 288 10*3/mm3      Neutrophil % 82.9 %      Lymphocyte % 8.4 %      Monocyte % 6.9 %      Eosinophil % 0.0 %      Basophil % 0.3 %      Immature Grans % 1.5 %      Neutrophils, Absolute 7.52 10*3/mm3      Lymphocytes, Absolute 0.76 10*3/mm3      Monocytes, Absolute 0.63 10*3/mm3      Eosinophils, Absolute 0.00 10*3/mm3      Basophils, Absolute 0.03 10*3/mm3      Immature Grans, Absolute 0.14 10*3/mm3      nRBC 0.0 /100 WBC           Imaging Results (Last 24  Hours)     ** No results found for the last 24 hours. **             Intake/Output Summary (Last 24 hours) at 1/6/2022 1208  Last data filed at 1/6/2022 1135  Gross per 24 hour   Intake 480 ml   Output 900 ml   Net -420 ml       Physical Exam  Constitutional:       General: He is not in acute distress.     Appearance: Normal appearance. He is normal weight.   HENT:      Head: Normocephalic and atraumatic.      Nose: Nose normal.      Mouth: Mucous membranes are moist.      Pharynx: Oropharynx is clear.    Eyes:      Conjunctiva/sclera: Conjunctivae normal.   Cardiovascular:      Rate and Rhythm: Normal rate and regular rhythm.      Pulses: Normal pulses.      Heart sounds: Normal heart sounds.   Pulmonary:      Effort: Pulmonary effort is normal.      Breath sounds: Normal breath sounds. No wheezing, rhonchi or rales.   Abdominal:      General: Abdomen is flat. Bowel sounds are normal. There is no distension.      Palpations: Abdomen is soft.      Tenderness: There is no abdominal tenderness.   Musculoskeletal:         General: No tenderness. Normal range of motion.      Right lower leg: No edema.      Left lower leg: No edema.   Skin:     General: Skin is warm and dry. .      Findings: No rash.   Neurological:      General: No focal deficit present.      Mental Status: He is alert and oriented to person, place, and time. Mental status is at baseline.    Psychiatric:         Mood and Affect: Mood normal.         Behavior: Behavior normal.         Thought Content: Thought content normal.     Results Review:  I have reviewed the labs, radiology results, and diagnostic studies since my last progress note and made treatment changes reflective of the results.   I have reviewed the current medications.    Assessment/Plan     Active Hospital Problems    Diagnosis    • **Pneumonia due to COVID-19 virus    • Acute respiratory failure with hypoxia (HCC)        PLAN:  Continue dexamethasone day #4 of 10  Continue adjunctive  treatments  Continue empiric antibiotics per infectious diseases    Electronically signed by Geoffrey Newsome DO, 01/06/22, 12:08 CST.

## 2022-01-06 NOTE — PLAN OF CARE
Goal Outcome Evaluation:              Outcome Summary: No c/o pain. VSS. Pt afebrile through the night. SpO2 89-92 on 4L. Sinus 75-99.

## 2022-01-06 NOTE — PROGRESS NOTES
"Infectious Diseases Progress Note    Patient:  Shant Arevalo  YOB: 1960  MRN: [id number]   Admit date: 1/3/2022   Admitting Physician: Geoffrey Newsome DO  Primary Care Physician: Provider, No Known    Chief Complaint/Interval History: He has dyspnea with exertion.  He is comfortable at rest.  FiO2 requirements trending down.  He is without fever.  Note initiated on January 6, 2022 when I saw him.  Note completed is a late entry on January 7, 2022    Intake/Output Summary (Last 24 hours) at 1/6/2022 0936  Last data filed at 1/6/2022 0500  Gross per 24 hour   Intake 480 ml   Output 850 ml   Net -370 ml     Allergies: No Known Allergies  Current Scheduled Medications:   ascorbic acid, 1,000 mg, Oral, Daily  azithromycin, 500 mg, Intravenous, Q24H  busPIRone, 5 mg, Oral, BID  cefTRIAXone, 2 g, Intravenous, Q24H  cetirizine, 10 mg, Oral, Daily  cholecalciferol, 1,000 Units, Oral, Daily  dexamethasone, 6 mg, Oral, Daily With Breakfast  enoxaparin, 40 mg, Subcutaneous, Q24H  famotidine, 20 mg, Oral, BID AC  FLUoxetine, 10 mg, Oral, Daily  fluticasone, 1 spray, Each Nare, BID  guaiFENesin, 1,200 mg, Oral, Q12H  melatonin, 6 mg, Oral, Nightly  mupirocin, , Each Nare, BID  oxybutynin XL, 10 mg, Oral, Daily  QUEtiapine fumarate ER, 50 mg, Oral, Nightly  sodium chloride, 10 mL, Intravenous, Q12H  tamsulosin, 0.4 mg, Oral, Daily  thiamine, 100 mg, Oral, Daily  zinc sulfate, 220 mg, Oral, Daily      Current PRN Medications:  •  acetaminophen  •  aluminum-magnesium hydroxide-simethicone  •  senna-docusate sodium **AND** polyethylene glycol **AND** bisacodyl **AND** bisacodyl  •  ondansetron  •  [COMPLETED] Insert peripheral IV **AND** sodium chloride    Review of Systems see HPI    Vital Signs:  /76 (BP Location: Left arm, Patient Position: Sitting)   Pulse 89   Temp 98.1 °F (36.7 °C) (Oral)   Resp 20   Ht 182.9 cm (72\")   Wt 86.2 kg (190 lb)   SpO2 92%   BMI 25.77 kg/m²     Physical Exam  Vital " signs - reviewed.  Line/IV site - No erythema, warmth, induration, or tenderness.  He looks tired but overall looks a little bit better  Lungs with good airflow without crackles or wheezes    Lab Results:  CBC:   Results from last 7 days   Lab Units 01/06/22  0436 01/04/22  0639 01/03/22  0824   WBC 10*3/mm3 9.08 8.68 6.70   HEMOGLOBIN g/dL 15.2 14.6 16.0   HEMATOCRIT % 45.3 45.1 48.4   PLATELETS 10*3/mm3 288 209 187     BMP:  Results from last 7 days   Lab Units 01/06/22  0436 01/04/22  0639 01/04/22  0639 01/03/22  0824 01/03/22  0824   SODIUM mmol/L 138  --  139  --  139   POTASSIUM mmol/L 4.9  --  4.5  --  4.1   CHLORIDE mmol/L 102  --  102  --  99   CO2 mmol/L 28.0  --  27.0  --  29.0   BUN mg/dL 25*  --  22*  --  20   CREATININE mg/dL 1.23  --  1.28*  --  1.48*   GLUCOSE mg/dL 130*   < > 151*   < > 153*   CALCIUM mg/dL 8.5*  --  8.1*  --  8.5*   ALT (SGPT) U/L 128*  --  105*  --  167*    < > = values in this interval not displayed.     Culture Results:   Blood Culture   Date Value Ref Range Status   01/03/2022 No growth at 2 days  Preliminary   01/03/2022 No growth at 2 days  Preliminary     Urine Culture   Date Value Ref Range Status   01/03/2022 No growth  Final     Radiology: None  Additional Studies Reviewed: None    Impression:   1.  COVID-19 infection/COVID-19 pneumonia  2.  Respiratory failure second COVID-19  3.  Completing antibiotic treatment for the possibility of secondary bacterial infection  4.  Mild acute renal insufficiency-improving  5.  Hepatic transaminitis likely related to COVID-trending down    Recommendations:   Continue 10-day course of dexamethasone  Continue prophylactic anticoagulation  Monitor lab and clinical course  Complete course of ceftriaxone/azithromycin  Continue to follow    Marlon Reyes MD

## 2022-01-07 PROCEDURE — 63710000001 DEXAMETHASONE PER 0.25 MG: Performed by: FAMILY MEDICINE

## 2022-01-07 PROCEDURE — 25010000002 CEFTRIAXONE PER 250 MG: Performed by: INTERNAL MEDICINE

## 2022-01-07 PROCEDURE — 25010000002 ENOXAPARIN PER 10 MG: Performed by: FAMILY MEDICINE

## 2022-01-07 PROCEDURE — 25010000002 AZITHROMYCIN PER 500 MG: Performed by: INTERNAL MEDICINE

## 2022-01-07 RX ADMIN — THIAMINE HCL TAB 100 MG 100 MG: 100 TAB at 08:47

## 2022-01-07 RX ADMIN — FAMOTIDINE 20 MG: 20 TABLET, FILM COATED ORAL at 17:15

## 2022-01-07 RX ADMIN — GUAIFENESIN 1200 MG: 600 TABLET, EXTENDED RELEASE ORAL at 08:46

## 2022-01-07 RX ADMIN — BUSPIRONE HYDROCHLORIDE 5 MG: 5 TABLET ORAL at 20:33

## 2022-01-07 RX ADMIN — FLUTICASONE PROPIONATE 1 SPRAY: 50 SPRAY, METERED NASAL at 20:35

## 2022-01-07 RX ADMIN — ACETAMINOPHEN 650 MG: 325 TABLET ORAL at 04:33

## 2022-01-07 RX ADMIN — ENOXAPARIN SODIUM 40 MG: 40 INJECTION SUBCUTANEOUS at 17:15

## 2022-01-07 RX ADMIN — Medication 1 APPLICATION: at 08:47

## 2022-01-07 RX ADMIN — ACETAMINOPHEN 650 MG: 325 TABLET ORAL at 21:20

## 2022-01-07 RX ADMIN — ACETAMINOPHEN 650 MG: 325 TABLET ORAL at 17:15

## 2022-01-07 RX ADMIN — DEXAMETHASONE 6 MG: 4 TABLET ORAL at 08:45

## 2022-01-07 RX ADMIN — ACETAMINOPHEN 650 MG: 325 TABLET ORAL at 09:32

## 2022-01-07 RX ADMIN — AZITHROMYCIN MONOHYDRATE 500 MG: 500 INJECTION, POWDER, LYOPHILIZED, FOR SOLUTION INTRAVENOUS at 20:40

## 2022-01-07 RX ADMIN — SODIUM CHLORIDE, PRESERVATIVE FREE 10 ML: 5 INJECTION INTRAVENOUS at 08:47

## 2022-01-07 RX ADMIN — FAMOTIDINE 20 MG: 20 TABLET, FILM COATED ORAL at 08:46

## 2022-01-07 RX ADMIN — CETIRIZINE HYDROCHLORIDE 10 MG: 10 TABLET ORAL at 08:46

## 2022-01-07 RX ADMIN — SODIUM CHLORIDE, PRESERVATIVE FREE 10 ML: 5 INJECTION INTRAVENOUS at 20:35

## 2022-01-07 RX ADMIN — OXYCODONE HYDROCHLORIDE AND ACETAMINOPHEN 1000 MG: 500 TABLET ORAL at 08:45

## 2022-01-07 RX ADMIN — CEFTRIAXONE SODIUM 2 G: 2 INJECTION, POWDER, FOR SOLUTION INTRAMUSCULAR; INTRAVENOUS at 20:31

## 2022-01-07 RX ADMIN — Medication 1 APPLICATION: at 20:30

## 2022-01-07 RX ADMIN — GUAIFENESIN 1200 MG: 600 TABLET, EXTENDED RELEASE ORAL at 20:32

## 2022-01-07 RX ADMIN — FLUTICASONE PROPIONATE 1 SPRAY: 50 SPRAY, METERED NASAL at 08:47

## 2022-01-07 RX ADMIN — Medication 1000 UNITS: at 08:46

## 2022-01-07 RX ADMIN — ZINC SULFATE 220 MG (50 MG) CAPSULE 220 MG: CAPSULE at 08:47

## 2022-01-07 RX ADMIN — DOCUSATE SODIUM 50 MG AND SENNOSIDES 8.6 MG 2 TABLET: 8.6; 5 TABLET, FILM COATED ORAL at 17:15

## 2022-01-07 RX ADMIN — QUETIAPINE FUMARATE 50 MG: 50 TABLET, EXTENDED RELEASE ORAL at 20:32

## 2022-01-07 NOTE — PROGRESS NOTES
"Infectious Diseases Progress Note    Patient:  Shant Arevalo  YOB: 1960  MRN: [id number]   Admit date: 1/3/2022   Admitting Physician: Geoffrey Newsome DO  Primary Care Physician: Provider, No Known    Chief Complaint/Interval History: He feels about the same.  He is without fever.  No productive cough.  Minimal dry cough.  He feels some dryness in the nasal mucosa.  Exercise tolerance about the same as yesterday.  FiO2 at 6 L and appears to be stable.    Intake/Output Summary (Last 24 hours) at 1/7/2022 1111  Last data filed at 1/7/2022 0700  Gross per 24 hour   Intake 720 ml   Output 1450 ml   Net -730 ml     Allergies: No Known Allergies  Current Scheduled Medications:   ascorbic acid, 1,000 mg, Oral, Daily  azithromycin, 500 mg, Intravenous, Q24H  busPIRone, 5 mg, Oral, BID  cefTRIAXone, 2 g, Intravenous, Q24H  cetirizine, 10 mg, Oral, Daily  cholecalciferol, 1,000 Units, Oral, Daily  dexamethasone, 6 mg, Oral, Daily With Breakfast  enoxaparin, 40 mg, Subcutaneous, Q24H  famotidine, 20 mg, Oral, BID AC  FLUoxetine, 10 mg, Oral, Daily  fluticasone, 1 spray, Each Nare, BID  guaiFENesin, 1,200 mg, Oral, Q12H  melatonin, 6 mg, Oral, Nightly  mupirocin, , Each Nare, BID  oxybutynin XL, 10 mg, Oral, Daily  QUEtiapine fumarate ER, 50 mg, Oral, Nightly  sodium chloride, 10 mL, Intravenous, Q12H  tamsulosin, 0.4 mg, Oral, Daily  thiamine, 100 mg, Oral, Daily  zinc sulfate, 220 mg, Oral, Daily      Current PRN Medications:  •  acetaminophen  •  aluminum-magnesium hydroxide-simethicone  •  senna-docusate sodium **AND** polyethylene glycol **AND** bisacodyl **AND** bisacodyl  •  ondansetron  •  [COMPLETED] Insert peripheral IV **AND** sodium chloride    Review of Systems see HPI    Vital Signs:  /98 (BP Location: Right arm, Patient Position: Lying)   Pulse 83   Temp 99.1 °F (37.3 °C) (Oral)   Resp 20   Ht 182.9 cm (72\")   Wt 86.2 kg (190 lb)   SpO2 92%   BMI 25.77 kg/m²     Physical " Exam  Vital signs - reviewed.  Line/IV site - No erythema, warmth, induration, or tenderness.  Lungs without crackles or wheezes  Heart without murmur  Abdomen soft nontender  Extremities without edema    Lab Results:  CBC:   Results from last 7 days   Lab Units 01/06/22  0436 01/04/22  0639 01/03/22  0824   WBC 10*3/mm3 9.08 8.68 6.70   HEMOGLOBIN g/dL 15.2 14.6 16.0   HEMATOCRIT % 45.3 45.1 48.4   PLATELETS 10*3/mm3 288 209 187     BMP:  Results from last 7 days   Lab Units 01/06/22  0436 01/04/22  0639 01/04/22  0639 01/03/22  0824 01/03/22  0824   SODIUM mmol/L 138  --  139  --  139   POTASSIUM mmol/L 4.9  --  4.5  --  4.1   CHLORIDE mmol/L 102  --  102  --  99   CO2 mmol/L 28.0  --  27.0  --  29.0   BUN mg/dL 25*  --  22*  --  20   CREATININE mg/dL 1.23  --  1.28*  --  1.48*   GLUCOSE mg/dL 130*   < > 151*   < > 153*   CALCIUM mg/dL 8.5*  --  8.1*  --  8.5*   ALT (SGPT) U/L 128*  --  105*  --  167*    < > = values in this interval not displayed.     Laboratory work January 6, 2022:  Component   Ref Range & Units 1 d ago 3 d ago 4 d ago   C-Reactive Protein   0.00 - 0.50 mg/dL 3.43 High   11.09 High   8.14 High       Ferritin 876    Culture Results:   Blood Culture   Date Value Ref Range Status   01/03/2022 No growth at 4 days  Preliminary   01/03/2022 No growth at 4 days  Preliminary     Urine Culture   Date Value Ref Range Status   01/03/2022 No growth  Final     Radiology: None  Additional Studies Reviewed: None    Impression:   COVID-19 infection/COVID-19 pneumonia  Respiratory failure secondary to COVID-19  Possible secondary bacterial infection  Improving mild acute renal insufficiency  Hepatic transaminitis-improved from a few days ago    Recommendations:   He seems to be stable to slowly improving.  His CRP has improved  Hopefully exercise tolerance and oxygen requirements will start to improve over the next 48 hours as well  He will complete azithromycin after today's dose  He will complete ceftriaxone  after his dose on January 9, 2022  Continue 10-day course of dexamethasone  Encourage incentive spirometry  Continue prophylactic anticoagulation  Wean oxygen as tolerated  Repeat lab in a.m.  Dr. Perez covering and will see Saturday or Sunday  Please call in interim with new problems or additional questions for infectious diseases    Marlon Reyes MD

## 2022-01-07 NOTE — PLAN OF CARE
Problem: Adult Inpatient Plan of Care  Goal: Plan of Care Review  Outcome: Ongoing, Progressing  Flowsheets (Taken 1/7/2022 0353)  Progress: no change  Plan of Care Reviewed With: patient  Outcome Summary: Patient has no c/o pain. On 6L. NSR 68-83 on tele. Receiving IV antibiotics. VSS. Safety maintained. Will notify MD of any changes.

## 2022-01-07 NOTE — CASE MANAGEMENT/SOCIAL WORK
Continued Stay Note   [facility]     Patient Name: Shant Arevalo  MRN: [id number]  Today's Date: 1/7/2022    Admit Date: 1/3/2022     Discharge Plan     Row Name 01/07/22 1024       Plan    Plan Home    Patient/Family in Agreement with Plan yes    Plan Comments Chart reviewed; events noted.  Pt plans to return home and denies any dc needs.  Might qualify for home 02 upon dc.  SW will follow.               Discharge Codes    No documentation.                     KARLI Hodges

## 2022-01-07 NOTE — PROGRESS NOTES
[facility]  INPATIENT PROGRESS NOTE    Length of Stay: 4  Date of Admission: 1/3/2022  Primary Care Physician: Provider, No Known    Subjective     Chief Complaint:     Shortness of breath    HPI     The patient states that he continues to feel stronger every day. His current oxygen requirement is 6 L. Inflammatory markers will be repeated tomorrow. He has a dry cough on rare occasion. Azithromycin course will be completed today. Fever is less frequent with maximum temperature of 99.1 over the past 24 hours.    Review of Systems     All pertinent negatives and positives are as above. All other systems have been reviewed and are negative unless otherwise stated.     Objective    Temp:  [97.9 °F (36.6 °C)-99.1 °F (37.3 °C)] 97.9 °F (36.6 °C)  Heart Rate:  [] 97  Resp:  [20] 20  BP: (124-156)/(70-98) 146/78    Lab Results (last 24 hours)     Procedure Component Value Units Date/Time    Blood Culture - Blood, Arm, Right [[id number]]  (Normal) Collected: 01/03/22 0825    Specimen: Blood from Arm, Right Updated: 01/07/22 1000     Blood Culture No growth at 4 days    Blood Culture - Blood, Arm, Left [[id number]]  (Normal) Collected: 01/03/22 0824    Specimen: Blood from Arm, Left Updated: 01/07/22 1000     Blood Culture No growth at 4 days          Imaging Results (Last 24 Hours)     ** No results found for the last 24 hours. **             Intake/Output Summary (Last 24 hours) at 1/7/2022 1656  Last data filed at 1/7/2022 1500  Gross per 24 hour   Intake 720 ml   Output 1500 ml   Net -780 ml       Physical Exam  Constitutional:       General: He is not in acute distress.     Appearance: Normal appearance. He is normal weight.   HENT:      Head: Normocephalic and atraumatic.      Nose: Nose normal.      Mouth: Mucous membranes are moist.      Pharynx: Oropharynx is clear.    Eyes:      Conjunctiva/sclera: Conjunctivae normal.   Cardiovascular:      Rate and Rhythm:  Normal rate and regular rhythm.      Pulses: Normal pulses.      Heart sounds: Normal heart sounds.   Pulmonary:      Effort: Pulmonary effort is normal.      Breath sounds: Normal breath sounds. No wheezing, rhonchi or rales.   Abdominal:      General: Abdomen is flat. Bowel sounds are normal. There is no distension.      Palpations: Abdomen is soft.      Tenderness: There is no abdominal tenderness.   Musculoskeletal:         General: No tenderness. Normal range of motion.      Right lower leg: No edema.      Left lower leg: No edema.   Skin:     General: Skin is warm and dry. .      Findings: No rash.   Neurological:      General: No focal deficit present.      Mental Status: He is alert and oriented to person, place, and time. Mental status is at baseline.    Psychiatric:         Mood and Affect: Mood normal.         Behavior: Behavior normal.         Thought Content: Thought content normal.     Results Review:  I have reviewed the labs, radiology results, and diagnostic studies since my last progress note and made treatment changes reflective of the results.   I have reviewed the current medications.    Assessment/Plan     Active Hospital Problems    Diagnosis    • **Pneumonia due to COVID-19 virus    • Acute respiratory failure with hypoxia (HCC)        PLAN:  Continue dexamethasone day #5 of 10   Continue adjunctive treatments  Continue empiric antibiotics per infectious diseases    Electronically signed by Geoffrey Newsome DO, 01/07/22, 16:56 CST.

## 2022-01-08 ENCOUNTER — APPOINTMENT (OUTPATIENT)
Dept: CT IMAGING | Facility: HOSPITAL | Age: 62
End: 2022-01-08

## 2022-01-08 LAB
ALBUMIN SERPL-MCNC: 3.3 G/DL (ref 3.5–5.2)
ALBUMIN/GLOB SERPL: 0.9 G/DL
ALP SERPL-CCNC: 185 U/L (ref 39–117)
ALT SERPL W P-5'-P-CCNC: 207 U/L (ref 1–41)
ANION GAP SERPL CALCULATED.3IONS-SCNC: 11 MMOL/L (ref 5–15)
AST SERPL-CCNC: 88 U/L (ref 1–40)
BACTERIA SPEC AEROBE CULT: NORMAL
BACTERIA SPEC AEROBE CULT: NORMAL
BILIRUB SERPL-MCNC: 1.2 MG/DL (ref 0–1.2)
BUN SERPL-MCNC: 23 MG/DL (ref 6–20)
BUN/CREAT SERPL: 18.5 (ref 7–25)
CALCIUM SPEC-SCNC: 8.7 MG/DL (ref 8.6–10.5)
CHLORIDE SERPL-SCNC: 100 MMOL/L (ref 98–107)
CO2 SERPL-SCNC: 29 MMOL/L (ref 22–29)
CREAT SERPL-MCNC: 1.24 MG/DL (ref 0.76–1.27)
CRP SERPL-MCNC: 9.6 MG/DL (ref 0–0.5)
DEPRECATED RDW RBC AUTO: 43.8 FL (ref 37–54)
ERYTHROCYTE [DISTWIDTH] IN BLOOD BY AUTOMATED COUNT: 12.7 % (ref 12.3–15.4)
FERRITIN SERPL-MCNC: 1078 NG/ML (ref 30–400)
GFR SERPL CREATININE-BSD FRML MDRD: 60 ML/MIN/1.73
GLOBULIN UR ELPH-MCNC: 3.6 GM/DL
GLUCOSE SERPL-MCNC: 124 MG/DL (ref 65–99)
HCT VFR BLD AUTO: 48.2 % (ref 37.5–51)
HGB BLD-MCNC: 16 G/DL (ref 13–17.7)
LYMPHOCYTES # BLD MANUAL: 1.2 10*3/MM3 (ref 0.7–3.1)
LYMPHOCYTES NFR BLD MANUAL: 2 % (ref 5–12)
MCH RBC QN AUTO: 31.3 PG (ref 26.6–33)
MCHC RBC AUTO-ENTMCNC: 33.2 G/DL (ref 31.5–35.7)
MCV RBC AUTO: 94.1 FL (ref 79–97)
MONOCYTES # BLD: 0.22 10*3/MM3 (ref 0.1–0.9)
NEUTROPHILS # BLD AUTO: 9.63 10*3/MM3 (ref 1.7–7)
NEUTROPHILS NFR BLD MANUAL: 86.1 % (ref 42.7–76)
NEUTS BAND NFR BLD MANUAL: 1 % (ref 0–5)
NEUTS VAC BLD QL SMEAR: ABNORMAL
PLAT MORPH BLD: NORMAL
PLATELET # BLD AUTO: 331 10*3/MM3 (ref 140–450)
PMV BLD AUTO: 10 FL (ref 6–12)
POTASSIUM SERPL-SCNC: 4.6 MMOL/L (ref 3.5–5.2)
PROT SERPL-MCNC: 6.9 G/DL (ref 6–8.5)
RBC # BLD AUTO: 5.12 10*6/MM3 (ref 4.14–5.8)
RBC MORPH BLD: NORMAL
SODIUM SERPL-SCNC: 140 MMOL/L (ref 136–145)
VARIANT LYMPHS NFR BLD MANUAL: 1 % (ref 0–5)
VARIANT LYMPHS NFR BLD MANUAL: 9.9 % (ref 19.6–45.3)
WBC NRBC COR # BLD: 11.05 10*3/MM3 (ref 3.4–10.8)

## 2022-01-08 PROCEDURE — 87040 BLOOD CULTURE FOR BACTERIA: CPT | Performed by: INTERNAL MEDICINE

## 2022-01-08 PROCEDURE — 85025 COMPLETE CBC W/AUTO DIFF WBC: CPT | Performed by: FAMILY MEDICINE

## 2022-01-08 PROCEDURE — 80053 COMPREHEN METABOLIC PANEL: CPT | Performed by: FAMILY MEDICINE

## 2022-01-08 PROCEDURE — 25010000002 CEFTRIAXONE PER 250 MG: Performed by: INTERNAL MEDICINE

## 2022-01-08 PROCEDURE — 82728 ASSAY OF FERRITIN: CPT | Performed by: FAMILY MEDICINE

## 2022-01-08 PROCEDURE — 86140 C-REACTIVE PROTEIN: CPT | Performed by: FAMILY MEDICINE

## 2022-01-08 PROCEDURE — 25010000002 ENOXAPARIN PER 10 MG: Performed by: FAMILY MEDICINE

## 2022-01-08 PROCEDURE — 94799 UNLISTED PULMONARY SVC/PX: CPT

## 2022-01-08 PROCEDURE — 63710000001 DEXAMETHASONE PER 0.25 MG: Performed by: FAMILY MEDICINE

## 2022-01-08 PROCEDURE — 85007 BL SMEAR W/DIFF WBC COUNT: CPT | Performed by: FAMILY MEDICINE

## 2022-01-08 PROCEDURE — 71250 CT THORAX DX C-: CPT

## 2022-01-08 RX ORDER — ECHINACEA PURPUREA EXTRACT 125 MG
2 TABLET ORAL AS NEEDED
Status: DISCONTINUED | OUTPATIENT
Start: 2022-01-08 | End: 2022-01-10 | Stop reason: HOSPADM

## 2022-01-08 RX ORDER — ACETAMINOPHEN 325 MG/1
650 TABLET ORAL EVERY 4 HOURS PRN
Status: DISCONTINUED | OUTPATIENT
Start: 2022-01-08 | End: 2022-01-10 | Stop reason: HOSPADM

## 2022-01-08 RX ADMIN — THIAMINE HCL TAB 100 MG 100 MG: 100 TAB at 08:43

## 2022-01-08 RX ADMIN — BISACODYL 5 MG: 5 TABLET ORAL at 08:43

## 2022-01-08 RX ADMIN — CETIRIZINE HYDROCHLORIDE 10 MG: 10 TABLET ORAL at 08:44

## 2022-01-08 RX ADMIN — Medication 1 APPLICATION: at 08:43

## 2022-01-08 RX ADMIN — SODIUM CHLORIDE, PRESERVATIVE FREE 10 ML: 5 INJECTION INTRAVENOUS at 21:43

## 2022-01-08 RX ADMIN — ACETAMINOPHEN 650 MG: 325 TABLET ORAL at 08:45

## 2022-01-08 RX ADMIN — ENOXAPARIN SODIUM 40 MG: 40 INJECTION SUBCUTANEOUS at 17:20

## 2022-01-08 RX ADMIN — QUETIAPINE FUMARATE 50 MG: 50 TABLET, EXTENDED RELEASE ORAL at 21:43

## 2022-01-08 RX ADMIN — FAMOTIDINE 20 MG: 20 TABLET, FILM COATED ORAL at 08:44

## 2022-01-08 RX ADMIN — SALINE NASAL SPRAY 2 SPRAY: 1.5 SOLUTION NASAL at 17:20

## 2022-01-08 RX ADMIN — FLUOXETINE 10 MG: 10 CAPSULE ORAL at 08:47

## 2022-01-08 RX ADMIN — OXYCODONE HYDROCHLORIDE AND ACETAMINOPHEN 1000 MG: 500 TABLET ORAL at 08:44

## 2022-01-08 RX ADMIN — ACETAMINOPHEN 650 MG: 325 TABLET ORAL at 02:12

## 2022-01-08 RX ADMIN — BUSPIRONE HYDROCHLORIDE 5 MG: 5 TABLET ORAL at 08:46

## 2022-01-08 RX ADMIN — CEFTRIAXONE SODIUM 2 G: 2 INJECTION, POWDER, FOR SOLUTION INTRAMUSCULAR; INTRAVENOUS at 19:50

## 2022-01-08 RX ADMIN — TAMSULOSIN HYDROCHLORIDE 0.4 MG: 0.4 CAPSULE ORAL at 08:44

## 2022-01-08 RX ADMIN — GUAIFENESIN 1200 MG: 600 TABLET, EXTENDED RELEASE ORAL at 08:44

## 2022-01-08 RX ADMIN — FAMOTIDINE 20 MG: 20 TABLET, FILM COATED ORAL at 17:20

## 2022-01-08 RX ADMIN — Medication 1000 UNITS: at 08:44

## 2022-01-08 RX ADMIN — DEXAMETHASONE 6 MG: 4 TABLET ORAL at 08:43

## 2022-01-08 RX ADMIN — ZINC SULFATE 220 MG (50 MG) CAPSULE 220 MG: CAPSULE at 08:43

## 2022-01-08 RX ADMIN — FLUTICASONE PROPIONATE 1 SPRAY: 50 SPRAY, METERED NASAL at 21:45

## 2022-01-08 RX ADMIN — BUSPIRONE HYDROCHLORIDE 5 MG: 5 TABLET ORAL at 21:43

## 2022-01-08 RX ADMIN — GUAIFENESIN 1200 MG: 600 TABLET, EXTENDED RELEASE ORAL at 21:43

## 2022-01-08 NOTE — PLAN OF CARE
Goal Outcome Evaluation:  Plan of Care Reviewed With: patient        Progress: no change  Outcome Summary: Patient had fever this AM, medicated wiith PRN Tylenol and it has resolved. ID notified, blood cultures drawn. CT chest done showed worsening infiltrates. O2 remains stable on 5L NC. No SOA. Pt was anxious this AM, seems better this afternoon. IV Rocephin continue. NSR/ST on tele. Cont to monitor, notify MD with any changes

## 2022-01-08 NOTE — PROGRESS NOTES
"INFECTIOUS DISEASES PROGRESS NOTE    Patient:  Shant Arevalo  YOB: 1960  MRN: [id number]   Admit date: 1/3/2022   Admitting Physician: Geoffrey Newsome DO  Primary Care Physician: Provider, No Known    Chief Complaint: Fever, nasal congestion        Interval History: Patient reports that he is overall improved.  The only thing that is \"getting me down\" is the fever.  I reviewed his fever curve after being called by NELDA Conley regarding fever today.  Did appear that he decrease in temperature curve for about a day and a half before spiking again today.    The patient reports he does not feel like he has been fever free.    He denies any change in his cough, diarrhea (complains of constipation), dysuria, rash, IV site tenderness    Allergies: No Known Allergies    Current Meds:     Current Facility-Administered Medications:   •  acetaminophen (TYLENOL) tablet 650 mg, 650 mg, Oral, Q4H PRN, Geoffrey Newsome DO  •  aluminum-magnesium hydroxide-simethicone (MAALOX MAX) 400-400-40 MG/5ML suspension 15 mL, 15 mL, Oral, Q6H PRN, Geoffrey Newsome DO  •  ascorbic acid (VITAMIN C) tablet 1,000 mg, 1,000 mg, Oral, Daily, Geoffrey Newsome DO, 1,000 mg at 01/08/22 0844  •  sennosides-docusate (PERICOLACE) 8.6-50 MG per tablet 2 tablet, 2 tablet, Oral, BID PRN, 2 tablet at 01/07/22 1715 **AND** polyethylene glycol (MIRALAX) packet 17 g, 17 g, Oral, Daily PRN **AND** bisacodyl (DULCOLAX) EC tablet 5 mg, 5 mg, Oral, Daily PRN, 5 mg at 01/08/22 0843 **AND** bisacodyl (DULCOLAX) suppository 10 mg, 10 mg, Rectal, Daily PRN, Geoffrey Newsome DO  •  busPIRone (BUSPAR) tablet 5 mg, 5 mg, Oral, BID, Geoffrey Newsome DO, 5 mg at 01/08/22 0846  •  cefTRIAXone (ROCEPHIN) 2 g/100 mL 0.9% NS IVPB (MBP), 2 g, Intravenous, Q24H, Marlon Garrett MD, 2 g at 01/07/22 2031  •  cetirizine (zyrTEC) tablet 10 mg, 10 mg, Oral, Daily, Geoffrey Newsome DO, 10 mg at 01/08/22 0844  •  cholecalciferol (VITAMIN D3) " tablet 1,000 Units, 1,000 Units, Oral, Daily, Geoffrey Newsome DO, 1,000 Units at 01/08/22 0844  •  dexamethasone (DECADRON) tablet 6 mg, 6 mg, Oral, Daily With Breakfast, Geoffrey Newsome DO, 6 mg at 01/08/22 0843  •  enoxaparin (LOVENOX) syringe 40 mg, 40 mg, Subcutaneous, Q24H, Geoffrey Newsome DO, 40 mg at 01/07/22 1715  •  famotidine (PEPCID) tablet 20 mg, 20 mg, Oral, BID AC, Geoffrey Newsome DO, 20 mg at 01/08/22 0844  •  FLUoxetine (PROzac) capsule 10 mg, 10 mg, Oral, Daily, Geoffrey Newsome DO, 10 mg at 01/08/22 0847  •  fluticasone (FLONASE) 50 MCG/ACT nasal spray 1 spray, 1 spray, Each Nare, BID, Geoffrey Newsome DO, 1 spray at 01/07/22 2035  •  guaiFENesin (MUCINEX) 12 hr tablet 1,200 mg, 1,200 mg, Oral, Q12H, Geoffrey Newsome DO, 1,200 mg at 01/08/22 0844  •  melatonin tablet 6 mg, 6 mg, Oral, Nightly, Geoffrey Newsome DO, 6 mg at 01/06/22 2119  •  mupirocin (BACTROBAN) 2 % nasal ointment, , Each Nare, BID, Juvenal Rollnis MD, 1 application at 01/08/22 0843  •  ondansetron (ZOFRAN) injection 4 mg, 4 mg, Intravenous, Q6H PRN, Geoffrey Newsome DO  •  QUEtiapine fumarate ER (SEROquel XR) tablet 50 mg, 50 mg, Oral, Nightly, Geoffrey Newsome DO, 50 mg at 01/07/22 2032  •  [COMPLETED] Insert peripheral IV, , , Once **AND** sodium chloride 0.9 % flush 10 mL, 10 mL, Intravenous, PRN, Geoffrey Newsome DO  •  sodium chloride 0.9 % flush 10 mL, 10 mL, Intravenous, Q12H, Geoffrey Newsome DO, 10 mL at 01/07/22 2035  •  tamsulosin (FLOMAX) 24 hr capsule 0.4 mg, 0.4 mg, Oral, Daily, Geoffrey Newsome DO, 0.4 mg at 01/08/22 0844  •  thiamine (VITAMIN B-1) tablet 100 mg, 100 mg, Oral, Daily, Geoffrey Newsome DO, 100 mg at 01/08/22 0843  •  zinc sulfate (ZINCATE) capsule 220 mg, 220 mg, Oral, Daily, Geoffrey Newsome DO, 220 mg at 01/08/22 0843      Review of Systems   Constitutional: Positive for fatigue and fever.   HENT: Positive for congestion.   "      Objective     Vital Signs:  Temp (24hrs), Av.8 °F (37.7 °C), Min:97.9 °F (36.6 °C), Max:101.5 °F (38.6 °C)      /76 (BP Location: Right arm, Patient Position: Lying)   Pulse 116   Temp 99.7 °F (37.6 °C) (Oral)   Resp 20   Ht 182.9 cm (72\")   Wt 86.2 kg (190 lb)   SpO2 91%   BMI 25.77 kg/m²         Physical Exam  General: Patient was lying flat in bed appearing in no acute distress.  HEENT: Patient had his oxygen around his upper lip near his mouth.  He stated he was congested and sometimes pulled it around his mouth but when he put it back up around his nose O2 sats increased from 89 to 93%  Neck: Supple  Respiratory: Effort even and unlabored, he was not conversationally dyspneic  Neuro: Alert and oriented, speech clear  Psych: Pleasant cooperative  Line/IV site: Peripheral IV antecubital left, condition patent and no redness    Results Review:    I reviewed the patient's new clinical results.    Lab Results:  CBC:   Lab Results   Lab 22  0824 22  0639 22   WBC 6.70 8.68 9.08 11.05*   HEMOGLOBIN 16.0 14.6 15.2 16.0   HEMATOCRIT 48.4 45.1 45.3 48.2   PLATELETS 187 209 288 331   LYMPHOCYTE % 4.0*  --   --  9.9*   MONOCYTES % 4.0*  --   --  2.0*         CMP:   Lab Results   Lab 22  0639 22  04322  024   SODIUM 139 138 140   POTASSIUM 4.5 4.9 4.6   CHLORIDE 102 102 100   CO2 27.0 28.0 29.0   BUN 22* 25* 23*   CREATININE 1.28* 1.23 1.24   CALCIUM 8.1* 8.5* 8.7   BILIRUBIN 0.6 0.5 1.2   ALK PHOS 117 131* 185*   ALT (SGPT) 105* 128* 207*   AST (SGOT) 47* 64* 88*   GLUCOSE 151* 130* 124*       COVID LABS:  Results From Last 14 Days   Lab Units 22 22  0639 22  1353 22  0824 22  0824   CRP mg/dL 9.60* 3.43* 11.09*  --    < > 8.14*   D DIMER QUANT mg/L (FEU)  --   --   --   --   --  1.54*   FERRITIN ng/mL 1,078.00* 876.00* 689.30*  --    < > 652.40*   LACTATE mmol/L  --   --   --   --   --  1.6 "   PROCALCITONIN ng/mL  --   --   --  0.16  --  0.18   PROTIME Seconds  --   --   --   --   --  12.8   INR   --   --   --   --   --  1.00    < > = values in this interval not displayed.       Estimated Creatinine Clearance: 78.2 mL/min (by C-G formula based on SCr of 1.24 mg/dL).    Culture Results:    Microbiology Results (last 10 days)     Procedure Component Value - Date/Time    MRSA Screen, PCR (Inpatient) - Swab, Nares [[id number]]  (Abnormal) Collected: 01/03/22 2046    Lab Status: Final result Specimen: Swab from Nares Updated: 01/03/22 2258     MRSA PCR MRSA Detected    Legionella Antigen, Urine - Urine, Urine, Clean Catch [[id number]]  (Normal) Collected: 01/03/22 2045    Lab Status: Final result Specimen: Urine, Clean Catch Updated: 01/03/22 2221     LEGIONELLA ANTIGEN, URINE Negative    S. Pneumo Ag Urine or CSF - Urine, Urine, Clean Catch [[id number]]  (Normal) Collected: 01/03/22 2045    Lab Status: Final result Specimen: Urine, Clean Catch Updated: 01/03/22 2221     Strep Pneumo Ag Negative    Urine Culture - Urine, Urine, Clean Catch [[id number]]  (Normal) Collected: 01/03/22 1316    Lab Status: Final result Specimen: Urine, Clean Catch Updated: 01/04/22 1300     Urine Culture No growth    COVID-19 and FLU A/B PCR - Swab, Nasopharynx [[id number]]  (Abnormal) Collected: 01/03/22 0827    Lab Status: Final result Specimen: Swab from Nasopharynx Updated: 01/03/22 0907     COVID19 Detected     Influenza A PCR Not Detected     Influenza B PCR Not Detected    Narrative:      Fact sheet for providers: https://www.fda.gov/media/714449/download    Fact sheet for patients: https://www.fda.gov/media/005935/download    Test performed by PCR.  Influenza A and Influenza B negative results should be considered presumptive in samples that have a positive SARS-CoV-2 result.    Competitive inhibition studies showed that SARS-CoV-2 virus, when present at concentrations above 3.6E+04 copies/mL, can inhibit the detection and  amplification of influenza A and influenza B virus RNA if present at or below 1.8E+02 copies/mL or 4.9E+02 copies/mL, respectively, and may lead to false negative influenza virus results. If co-infection with influenza A or influenza B virus is suspected in samples with a positive SARS-CoV-2 result, the sample should be re-tested with another FDA cleared, approved, or authorized influenza test, if influenza virus detection would change clinical management.    Blood Culture - Blood, Arm, Right [[id number]]  (Normal) Collected: 01/03/22 0825    Lab Status: Final result Specimen: Blood from Arm, Right Updated: 01/08/22 1000     Blood Culture No growth at 5 days    Blood Culture - Blood, Arm, Left [[id number]]  (Normal) Collected: 01/03/22 0824    Lab Status: Final result Specimen: Blood from Arm, Left Updated: 01/08/22 1000     Blood Culture No growth at 5 days               Radiology:           Imaging Results (Last 72 Hours)     Procedure Component Value Units Date/Time    CT Chest Without Contrast Diagnostic [[id number]] Collected: 01/08/22 1012     Updated: 01/08/22 1017    Narrative:      CT CHEST WO CONTRAST DIAGNOSTIC- 1/8/2022 9:06 AM CST     HISTORY: Respiratory illness, nondiagnostic xray; U07.1-COVID-19;  J12.82-Pneumonia due to coronavirus disease 2019; R09.02-Hypoxemia     COMPARISON: CT chest dated 1/3/2022     DOSE LENGTH PRODUCT: 324 mGy cm. Automated exposure control was also  utilized to decrease patient radiation dose.     TECHNIQUE: Serial helical tomographic images of the chest were acquired.  Multiplanar reformatted images were provided for review.     FINDINGS:  The imaged portion of the neck and thyroid gland is unremarkable.      Worsening bilateral lung parenchymal opacities are most concerning for  Covid 19 infection. Trace bilateral pleural effusions.. The trachea and  bronchial tree are patent.     The heart, great vessels, and pulmonary vessels are normal in appearance  within limits of a  noncontrast study. There is no pericardial effusion.  No enlarged axillary or mediastinal lymph nodes are present.      No acute findings are seen in the bones or surrounding soft tissues.     No acute findings are seen in the visualized portion of the upper  abdomen.       Impression:      1. Worsening lung parenchymal opacities.        This report was finalized on 01/08/2022 10:14 by Dr. Margarita Hoover MD.          Assessment/Plan     Active Hospital Problems    Diagnosis    • **Pneumonia due to COVID-19 virus    • Acute respiratory failure with hypoxia (HCC)        IMPRESSION:  1. Acute hypoxemic respiratory failure secondary to COVID-19 infection and pneumonia-O2 requirements improving  2. Fever-seems to be fairly persistent.  Reviewed Dr. Reyes's consult on January 3 and patient had complained of 10 days of fever prior to admission.  He has been treated empirically for possible bacterial superinfection.  He has completed 5 days of azithromycin as of January 7.  He remains on ceftriaxone his review of systems are otherwise negative.  3. Abnormal CT chest obtained today.  Compared to CT 5 days earlier.  4. Elevated transaminases  5. Nasal congestion-patient reports five previous sinus surgeries  6. MRSA nasal screen positive.      RECOMMENDATION:   · Follow-up on blood cultures ordered  · Encouraged proning/near proning  · [place] nasal spray at bedside-May need to consider very short term use of Afrin  · Reviewed med list and there were several days patient refused buspirone, fluoxetine, oxybutynin, tamsulosin-I question if this is an old med list that was reordered  · Continue dexamethasone-day #6  · VTE prophylaxis per attending  · Adjuvant therapy per attending        Judy Magallon MD  01/08/22  12:12 CST

## 2022-01-08 NOTE — PLAN OF CARE
Goal Outcome Evaluation:  Plan of Care Reviewed With: patient        Progress: no change  Outcome Summary: ALERT AND ORIENTED, PRN TYLENOL GIVEN FOR FEVER, NO SOB REPORTED, ON 6 L NASAL CANNULA. DROPLET AND CONTACT ISOLATION MAINTAINED. WILL CONTINUE TO MONITOR.

## 2022-01-08 NOTE — PROGRESS NOTES
[facility] Medicine Services  INPATIENT PROGRESS NOTE    Length of Stay: 5  Date of Admission: 1/3/2022  Primary Care Physician: Provider, No Known    Subjective     Chief Complaint:     Fever, shortness of breath    HPI     The patient redeveloped a fever of 101.2 over the past 24 hours.  Blood cultures have been ordered to be repeated per infectious diseases.  He remains on ceftriaxone which was started on 1/3.  I repeated his CT scan of his chest today showing increasing infiltrates but no evidence of abscess formation or effusion.  CRP is higher today at 9.6.  Ferritin is higher at 1078.  WBC increased to 11,000.  He was on 5 L per nasal cannula when I evaluated him with saturations at 97%.  I turned his oxygen down to 4 L while I was in the room.      Review of Systems     All pertinent negatives and positives are as above. All other systems have been reviewed and are negative unless otherwise stated.     Objective    Temp:  [97 °F (36.1 °C)-101.2 °F (38.4 °C)] 97 °F (36.1 °C)  Heart Rate:  [] 93  Resp:  [20] 20  BP: (118-152)/(68-90) 118/68    Lab Results (last 24 hours)     Procedure Component Value Units Date/Time    Blood Culture With NICOLLE - Blood, Hand, Right [[id number]] Collected: 01/08/22 1127    Specimen: Blood from Hand, Right Updated: 01/08/22 1205    Blood Culture With NICOLLE - Blood, Arm, Left [[id number]] Collected: 01/08/22 1127    Specimen: Blood from Arm, Left Updated: 01/08/22 1205    Blood Culture - Blood, Arm, Right [[id number]]  (Normal) Collected: 01/03/22 0825    Specimen: Blood from Arm, Right Updated: 01/08/22 1000     Blood Culture No growth at 5 days    Blood Culture - Blood, Arm, Left [[id number]]  (Normal) Collected: 01/03/22 0824    Specimen: Blood from Arm, Left Updated: 01/08/22 1000     Blood Culture No growth at 5 days    Manual Differential [[id number]]  (Abnormal) Collected: 01/08/22 0247    Specimen: Blood Updated: 01/08/22 0413      Neutrophil % 86.1 %      Lymphocyte % 9.9 %      Monocyte % 2.0 %      Bands %  1.0 %      Atypical Lymphocyte % 1.0 %      Neutrophils Absolute 9.63 10*3/mm3      Lymphocytes Absolute 1.20 10*3/mm3      Monocytes Absolute 0.22 10*3/mm3      RBC Morphology Normal     Vacuolated Neutrophils Slight/1+     Platelet Morphology Normal    Ferritin [[id number]]  (Abnormal) Collected: 01/08/22 0247    Specimen: Blood Updated: 01/08/22 0403     Ferritin 1,078.00 ng/mL     Narrative:      Results may be falsely decreased if patient taking Biotin.      C-reactive Protein [[id number]]  (Abnormal) Collected: 01/08/22 0247    Specimen: Blood Updated: 01/08/22 0402     C-Reactive Protein 9.60 mg/dL     Comprehensive Metabolic Panel [[id number]]  (Abnormal) Collected: 01/08/22 0247    Specimen: Blood Updated: 01/08/22 0400     Glucose 124 mg/dL      BUN 23 mg/dL      Creatinine 1.24 mg/dL      Sodium 140 mmol/L      Potassium 4.6 mmol/L      Chloride 100 mmol/L      CO2 29.0 mmol/L      Calcium 8.7 mg/dL      Total Protein 6.9 g/dL      Albumin 3.30 g/dL      ALT (SGPT) 207 U/L      AST (SGOT) 88 U/L      Alkaline Phosphatase 185 U/L      Total Bilirubin 1.2 mg/dL      eGFR Non African Amer 60 mL/min/1.73      Globulin 3.6 gm/dL      A/G Ratio 0.9 g/dL      BUN/Creatinine Ratio 18.5     Anion Gap 11.0 mmol/L     Narrative:      GFR Normal >60  Chronic Kidney Disease <60  Kidney Failure <15      CBC & Differential [[id number]]  (Abnormal) Collected: 01/08/22 0247    Specimen: Blood Updated: 01/08/22 0336    Narrative:      The following orders were created for panel order CBC & Differential.  Procedure                               Abnormality         Status                     ---------                               -----------         ------                     CBC Auto Differential[[id number]]        Abnormal            Final result                 Please view results for these tests on the individual orders.    CBC Auto Differential  [[id number]]  (Abnormal) Collected: 01/08/22 0247    Specimen: Blood Updated: 01/08/22 0336     WBC 11.05 10*3/mm3      RBC 5.12 10*6/mm3      Hemoglobin 16.0 g/dL      Hematocrit 48.2 %      MCV 94.1 fL      MCH 31.3 pg      MCHC 33.2 g/dL      RDW 12.7 %      RDW-SD 43.8 fl      MPV 10.0 fL      Platelets 331 10*3/mm3           Imaging Results (Last 24 Hours)     Procedure Component Value Units Date/Time    CT Chest Without Contrast Diagnostic [[id number]] Collected: 01/08/22 1012     Updated: 01/08/22 1017    Narrative:      CT CHEST WO CONTRAST DIAGNOSTIC- 1/8/2022 9:06 AM CST     HISTORY: Respiratory illness, nondiagnostic xray; U07.1-COVID-19;  J12.82-Pneumonia due to coronavirus disease 2019; R09.02-Hypoxemia     COMPARISON: CT chest dated 1/3/2022     DOSE LENGTH PRODUCT: 324 mGy cm. Automated exposure control was also  utilized to decrease patient radiation dose.     TECHNIQUE: Serial helical tomographic images of the chest were acquired.  Multiplanar reformatted images were provided for review.     FINDINGS:  The imaged portion of the neck and thyroid gland is unremarkable.      Worsening bilateral lung parenchymal opacities are most concerning for  Covid 19 infection. Trace bilateral pleural effusions.. The trachea and  bronchial tree are patent.     The heart, great vessels, and pulmonary vessels are normal in appearance  within limits of a noncontrast study. There is no pericardial effusion.  No enlarged axillary or mediastinal lymph nodes are present.      No acute findings are seen in the bones or surrounding soft tissues.     No acute findings are seen in the visualized portion of the upper  abdomen.       Impression:      1. Worsening lung parenchymal opacities.        This report was finalized on 01/08/2022 10:14 by Dr. Margarita Hoover MD.             Intake/Output Summary (Last 24 hours) at 1/8/2022 1732  Last data filed at 1/8/2022 1717  Gross per 24 hour   Intake --   Output 1500 ml   Net -1500 ml        Physical Exam  Constitutional:       General: He is not in acute distress.     Appearance: Normal appearance. He is normal weight.   HENT:      Head: Normocephalic and atraumatic.      Nose: Nose normal.      Mouth: Mucous membranes are moist.      Pharynx: Oropharynx is clear.    Eyes:      Conjunctiva/sclera: Conjunctivae normal.   Cardiovascular:      Rate and Rhythm: Normal rate and regular rhythm.      Pulses: Normal pulses.      Heart sounds: Normal heart sounds.   Pulmonary:      Effort: Pulmonary effort is normal.      Breath sounds: Normal breath sounds. No wheezing, rhonchi or rales.   Abdominal:      General: Abdomen is flat. Bowel sounds are normal. There is no distension.      Palpations: Abdomen is soft.      Tenderness: There is no abdominal tenderness.   Musculoskeletal:         General: No tenderness. Normal range of motion.      Right lower leg: No edema.      Left lower leg: No edema.   Skin:     General: Skin is warm and dry. .      Findings: No rash.   Neurological:      General: No focal deficit present.      Mental Status: He is alert and oriented to person, place, and time. Mental status is at baseline.    Psychiatric:         Mood and Affect: Mood normal.         Behavior: Behavior normal.         Thought Content: Thought content normal.     Results Review:  I have reviewed the labs, radiology results, and diagnostic studies since my last progress note and made treatment changes reflective of the results.   I have reviewed the current medications.    Assessment/Plan     Active Hospital Problems    Diagnosis    • **Pneumonia due to COVID-19 virus    • Acute respiratory failure with hypoxia (HCC)        PLAN:  Continue Rocephin per infectious diseases  Continue adjunctive treatments  Continue dexamethasone day #6 of 10  Blood cultures repeated per infectious diseases  CT scan of the chest, done    Electronically signed by Geoffrey Newsome DO, 01/08/22, 17:32 CST.

## 2022-01-09 PROCEDURE — 63710000001 DEXAMETHASONE PER 0.25 MG: Performed by: FAMILY MEDICINE

## 2022-01-09 PROCEDURE — 25010000002 CEFTRIAXONE PER 250 MG: Performed by: INTERNAL MEDICINE

## 2022-01-09 PROCEDURE — 25010000002 ENOXAPARIN PER 10 MG: Performed by: FAMILY MEDICINE

## 2022-01-09 RX ADMIN — CEFTRIAXONE SODIUM 2 G: 2 INJECTION, POWDER, FOR SOLUTION INTRAMUSCULAR; INTRAVENOUS at 18:09

## 2022-01-09 RX ADMIN — GUAIFENESIN 1200 MG: 600 TABLET, EXTENDED RELEASE ORAL at 21:07

## 2022-01-09 RX ADMIN — ENOXAPARIN SODIUM 40 MG: 40 INJECTION SUBCUTANEOUS at 18:10

## 2022-01-09 RX ADMIN — ZINC SULFATE 220 MG (50 MG) CAPSULE 220 MG: CAPSULE at 08:41

## 2022-01-09 RX ADMIN — Medication 1 APPLICATION: at 08:42

## 2022-01-09 RX ADMIN — THIAMINE HCL TAB 100 MG 100 MG: 100 TAB at 08:41

## 2022-01-09 RX ADMIN — CETIRIZINE HYDROCHLORIDE 10 MG: 10 TABLET ORAL at 08:41

## 2022-01-09 RX ADMIN — DEXAMETHASONE 6 MG: 4 TABLET ORAL at 08:41

## 2022-01-09 RX ADMIN — FAMOTIDINE 20 MG: 20 TABLET, FILM COATED ORAL at 08:41

## 2022-01-09 RX ADMIN — FAMOTIDINE 20 MG: 20 TABLET, FILM COATED ORAL at 18:09

## 2022-01-09 RX ADMIN — SODIUM CHLORIDE, PRESERVATIVE FREE 10 ML: 5 INJECTION INTRAVENOUS at 21:07

## 2022-01-09 RX ADMIN — QUETIAPINE FUMARATE 50 MG: 50 TABLET, EXTENDED RELEASE ORAL at 21:07

## 2022-01-09 RX ADMIN — GUAIFENESIN 1200 MG: 600 TABLET, EXTENDED RELEASE ORAL at 08:41

## 2022-01-09 RX ADMIN — Medication 1000 UNITS: at 08:41

## 2022-01-09 RX ADMIN — OXYCODONE HYDROCHLORIDE AND ACETAMINOPHEN 1000 MG: 500 TABLET ORAL at 08:41

## 2022-01-09 RX ADMIN — FLUTICASONE PROPIONATE 1 SPRAY: 50 SPRAY, METERED NASAL at 08:42

## 2022-01-09 NOTE — PLAN OF CARE
Goal Outcome Evaluation:           Progress: improving  Outcome Summary: Up ad erick no acute resp difficulty voiding per urinal , cont to monitor  o2 at 5l/m  pt remains in isolation pt sinus   no falls or injuries .

## 2022-01-09 NOTE — PLAN OF CARE
Goal Outcome Evaluation:  Plan of Care Reviewed With: patient        Progress: improving  Outcome Summary: PATIENT IS DOING WELL. SATS WNL ON 4 LITERS. NO C/O PAIN. S/ST  PER TELE. WALKING OX ORDERED. PROBABLY HOME SOON.

## 2022-01-09 NOTE — PROGRESS NOTES
[facility] Medicine [facility]  INPATIENT PROGRESS NOTE    Length of Stay: 6  Date of Admission: 1/3/2022  Primary Care Physician: Provider, No Known    Subjective     Chief Complaint:     Shortness of breath    HPI     The patient has been afebrile over the past 24 hours.  Repeat blood cultures by infectious diseases show no growth at 24 hours.  Patient indicates that he is feeling much better and is stronger.  He is very anxious to be discharged home and would like to go home tomorrow.  He currently is on 4 L with saturations in the 95-96 range.  I will ask for a walking oximetry tomorrow morning for assessment of his oxygen requirement with exertion.      Review of Systems     All pertinent negatives and positives are as above. All other systems have been reviewed and are negative unless otherwise stated.     Objective    Temp:  [97.3 °F (36.3 °C)-98.4 °F (36.9 °C)] 97.9 °F (36.6 °C)  Heart Rate:  [76-97] 91  Resp:  [16-20] 18  BP: (124-150)/(68-88) 124/70    Lab Results (last 24 hours)     Procedure Component Value Units Date/Time    Blood Culture With NICOLLE - Blood, Arm, Left [[id number]]  (Normal) Collected: 01/08/22 1127    Specimen: Blood from Arm, Left Updated: 01/09/22 1215     Blood Culture No growth at 24 hours    Blood Culture With NICOLLE - Blood, Hand, Right [[id number]]  (Normal) Collected: 01/08/22 1127    Specimen: Blood from Hand, Right Updated: 01/09/22 1215     Blood Culture No growth at 24 hours          Imaging Results (Last 24 Hours)     ** No results found for the last 24 hours. **             Intake/Output Summary (Last 24 hours) at 1/9/2022 1750  Last data filed at 1/9/2022 1635  Gross per 24 hour   Intake 720 ml   Output 1125 ml   Net -405 ml       Physical Exam  Constitutional:       General: He is not in acute distress.     Appearance: Normal appearance. He is normal weight.   HENT:      Head: Normocephalic and atraumatic.      Nose: Nose normal.      Mouth:  Mucous membranes are moist.      Pharynx: Oropharynx is clear.    Eyes:      Conjunctiva/sclera: Conjunctivae normal.   Cardiovascular:      Rate and Rhythm: Normal rate and regular rhythm.      Pulses: Normal pulses.      Heart sounds: Normal heart sounds.   Pulmonary:      Effort: Pulmonary effort is normal.      Breath sounds: Normal breath sounds. No wheezing, rhonchi or rales.   Abdominal:      General: Abdomen is flat. Bowel sounds are normal. There is no distension.      Palpations: Abdomen is soft.      Tenderness: There is no abdominal tenderness.   Musculoskeletal:         General: No tenderness. Normal range of motion.      Right lower leg: No edema.      Left lower leg: No edema.   Skin:     General: Skin is warm and dry. .      Findings: No rash.   Neurological:      General: No focal deficit present.      Mental Status: He is alert and oriented to person, place, and time. Mental status is at baseline.    Psychiatric:         Mood and Affect: Mood normal.         Behavior: Behavior normal.         Thought Content: Thought content normal.     Results Review:  I have reviewed the labs, radiology results, and diagnostic studies since my last progress note and made treatment changes reflective of the results.   I have reviewed the current medications.    Assessment/Plan     Active Hospital Problems    Diagnosis    • **Pneumonia due to COVID-19 virus    • Acute respiratory failure with hypoxia (HCC)        PLAN:  Walking oximetry in a.m.  Continue dexamethasone day #6 of 10  Continue adjunctive treatments  Antibiotics per infectious diseases  Likely discharge tomorrow    Electronically signed by Geoffrey Newsome DO, 01/09/22, 17:50 CST.

## 2022-01-10 ENCOUNTER — READMISSION MANAGEMENT (OUTPATIENT)
Dept: CALL CENTER | Facility: HOSPITAL | Age: 62
End: 2022-01-10

## 2022-01-10 ENCOUNTER — TELEPHONE (OUTPATIENT)
Dept: INTERNAL MEDICINE | Facility: CLINIC | Age: 62
End: 2022-01-10

## 2022-01-10 VITALS
RESPIRATION RATE: 22 BRPM | WEIGHT: 187 LBS | HEIGHT: 72 IN | OXYGEN SATURATION: 96 % | SYSTOLIC BLOOD PRESSURE: 132 MMHG | DIASTOLIC BLOOD PRESSURE: 76 MMHG | BODY MASS INDEX: 25.33 KG/M2 | HEART RATE: 91 BPM | TEMPERATURE: 98.6 F

## 2022-01-10 PROBLEM — D89.831 CYTOKINE RELEASE SYNDROME, GRADE 1: Status: ACTIVE | Noted: 2022-01-10

## 2022-01-10 LAB
ALBUMIN SERPL-MCNC: 3.2 G/DL (ref 3.5–5.2)
ALBUMIN/GLOB SERPL: 0.9 G/DL
ALP SERPL-CCNC: 144 U/L (ref 39–117)
ALT SERPL W P-5'-P-CCNC: 148 U/L (ref 1–41)
ANION GAP SERPL CALCULATED.3IONS-SCNC: 8 MMOL/L (ref 5–15)
AST SERPL-CCNC: 65 U/L (ref 1–40)
BASOPHILS # BLD AUTO: 0.03 10*3/MM3 (ref 0–0.2)
BASOPHILS NFR BLD AUTO: 0.3 % (ref 0–1.5)
BILIRUB SERPL-MCNC: 0.5 MG/DL (ref 0–1.2)
BUN SERPL-MCNC: 22 MG/DL (ref 6–20)
BUN/CREAT SERPL: 19.3 (ref 7–25)
CALCIUM SPEC-SCNC: 8.3 MG/DL (ref 8.6–10.5)
CHLORIDE SERPL-SCNC: 105 MMOL/L (ref 98–107)
CO2 SERPL-SCNC: 28 MMOL/L (ref 22–29)
CREAT SERPL-MCNC: 1.14 MG/DL (ref 0.76–1.27)
CRP SERPL-MCNC: 6.26 MG/DL (ref 0–0.5)
DEPRECATED RDW RBC AUTO: 42.7 FL (ref 37–54)
EOSINOPHIL # BLD AUTO: 0.11 10*3/MM3 (ref 0–0.4)
EOSINOPHIL NFR BLD AUTO: 1 % (ref 0.3–6.2)
ERYTHROCYTE [DISTWIDTH] IN BLOOD BY AUTOMATED COUNT: 12.6 % (ref 12.3–15.4)
FERRITIN SERPL-MCNC: 1542 NG/ML (ref 30–400)
GFR SERPL CREATININE-BSD FRML MDRD: 66 ML/MIN/1.73
GLOBULIN UR ELPH-MCNC: 3.4 GM/DL
GLUCOSE SERPL-MCNC: 145 MG/DL (ref 65–99)
HCT VFR BLD AUTO: 42.7 % (ref 37.5–51)
HGB BLD-MCNC: 14.5 G/DL (ref 13–17.7)
IMM GRANULOCYTES # BLD AUTO: 0.29 10*3/MM3 (ref 0–0.05)
IMM GRANULOCYTES NFR BLD AUTO: 2.6 % (ref 0–0.5)
LYMPHOCYTES # BLD AUTO: 1.16 10*3/MM3 (ref 0.7–3.1)
LYMPHOCYTES NFR BLD AUTO: 10.4 % (ref 19.6–45.3)
MCH RBC QN AUTO: 31.2 PG (ref 26.6–33)
MCHC RBC AUTO-ENTMCNC: 34 G/DL (ref 31.5–35.7)
MCV RBC AUTO: 91.8 FL (ref 79–97)
MONOCYTES # BLD AUTO: 0.59 10*3/MM3 (ref 0.1–0.9)
MONOCYTES NFR BLD AUTO: 5.3 % (ref 5–12)
NEUTROPHILS NFR BLD AUTO: 8.98 10*3/MM3 (ref 1.7–7)
NEUTROPHILS NFR BLD AUTO: 80.4 % (ref 42.7–76)
NRBC BLD AUTO-RTO: 0 /100 WBC (ref 0–0.2)
PLATELET # BLD AUTO: 367 10*3/MM3 (ref 140–450)
PMV BLD AUTO: 9.6 FL (ref 6–12)
POTASSIUM SERPL-SCNC: 4.6 MMOL/L (ref 3.5–5.2)
PROT SERPL-MCNC: 6.6 G/DL (ref 6–8.5)
RBC # BLD AUTO: 4.65 10*6/MM3 (ref 4.14–5.8)
SODIUM SERPL-SCNC: 141 MMOL/L (ref 136–145)
WBC NRBC COR # BLD: 11.16 10*3/MM3 (ref 3.4–10.8)

## 2022-01-10 PROCEDURE — 80053 COMPREHEN METABOLIC PANEL: CPT | Performed by: FAMILY MEDICINE

## 2022-01-10 PROCEDURE — 94618 PULMONARY STRESS TESTING: CPT

## 2022-01-10 PROCEDURE — 85025 COMPLETE CBC W/AUTO DIFF WBC: CPT | Performed by: FAMILY MEDICINE

## 2022-01-10 PROCEDURE — 63710000001 DEXAMETHASONE PER 0.25 MG: Performed by: FAMILY MEDICINE

## 2022-01-10 PROCEDURE — 86140 C-REACTIVE PROTEIN: CPT | Performed by: FAMILY MEDICINE

## 2022-01-10 PROCEDURE — 82728 ASSAY OF FERRITIN: CPT | Performed by: FAMILY MEDICINE

## 2022-01-10 RX ORDER — TAMSULOSIN HYDROCHLORIDE 0.4 MG/1
CAPSULE ORAL
Qty: 60 CAPSULE | Refills: 11
Start: 2022-01-10 | End: 2022-01-17

## 2022-01-10 RX ORDER — BENZONATATE 100 MG/1
100 CAPSULE ORAL 3 TIMES DAILY PRN
Qty: 30 CAPSULE | Refills: 0 | Status: SHIPPED | OUTPATIENT
Start: 2022-01-10 | End: 2022-01-17

## 2022-01-10 RX ORDER — DEXAMETHASONE 6 MG/1
6 TABLET ORAL
Qty: 2 TABLET | Refills: 0 | Status: SHIPPED | OUTPATIENT
Start: 2022-01-11 | End: 2022-01-13

## 2022-01-10 RX ADMIN — THIAMINE HCL TAB 100 MG 100 MG: 100 TAB at 08:15

## 2022-01-10 RX ADMIN — CETIRIZINE HYDROCHLORIDE 10 MG: 10 TABLET ORAL at 08:14

## 2022-01-10 RX ADMIN — ZINC SULFATE 220 MG (50 MG) CAPSULE 220 MG: CAPSULE at 08:15

## 2022-01-10 RX ADMIN — Medication 1 APPLICATION: at 08:15

## 2022-01-10 RX ADMIN — DEXAMETHASONE 6 MG: 4 TABLET ORAL at 08:14

## 2022-01-10 RX ADMIN — Medication 1000 UNITS: at 08:14

## 2022-01-10 RX ADMIN — GUAIFENESIN 1200 MG: 600 TABLET, EXTENDED RELEASE ORAL at 08:14

## 2022-01-10 RX ADMIN — OXYCODONE HYDROCHLORIDE AND ACETAMINOPHEN 1000 MG: 500 TABLET ORAL at 08:14

## 2022-01-10 RX ADMIN — FLUTICASONE PROPIONATE 1 SPRAY: 50 SPRAY, METERED NASAL at 08:16

## 2022-01-10 RX ADMIN — FAMOTIDINE 20 MG: 20 TABLET, FILM COATED ORAL at 08:14

## 2022-01-10 RX ADMIN — BUSPIRONE HYDROCHLORIDE 5 MG: 5 TABLET ORAL at 00:18

## 2022-01-10 NOTE — PROGRESS NOTES
Exercise Oximetry    Patient Name:Shant Arevalo   MRN: [id number]   Date: 01/10/22             ROOM AIR BASELINE   SpO2% 91   Heart Rate 85   Blood Pressure      EXERCISE ON ROOM AIR SpO2% EXERCISE ON O2 @  4 LPM SpO2%   1 MINUTE      84 1 MINUTE    91   2 MINUTES  2 MINUTES    90   3 MINUTES  3 MINUTES    90   4 MINUTES  4 MINUTES    5 MINUTES  5 MINUTES    6 MINUTES  6 MINUTES               Distance Walked   Distance Walked     200 feet   Dyspnea (Spike Scale)   Dyspnea (Spike Scale)   Fatigue (Spike Scale)   Fatigue (Spike Scale)   SpO2% Post Exercise   SpO2% Post Exercise   HR Post Exercise   HR Post Exercise   Time to Recovery   Time to Recovery     Comments: Pt dropped to 84% on room air after walking 15 feet.  Had to place pt on 4 lpm nc oxygen while walking to keep sat above 90%.  NELDA Smart notified.

## 2022-01-10 NOTE — PAYOR COMM NOTE
"1/10/22 [facility] [phone]  [phone]    UPDATE CLINICAL            Shant Morejon (61 y.o. Male)             Date of Birth Social Security Number Address Home Phone MRN    1960  [address] [place] [address] [phone] [id number]    Hinduism Marital Status             Other        Admission Date Admission Type Admitting Provider Attending Provider Department, Room/Bed    1/3/22 Emergency Jose lAfredo Estrada MD Fleming, John Eric, MD [address], 444/1    Discharge Date Discharge Disposition Discharge Destination           Home or Self Care              Attending Provider: Jose Alfredo Estrada MD    Allergies: No Known Allergies    Isolation: Enh Drop/Con   Infection: COVID (confirmed) (01/03/22), MRSA (01/03/22)   Code Status: CPR   Advance Care Planning Activity    Ht: 182.9 cm (72\")   Wt: 84.8 kg (187 lb)    Admission Cmt: None   Principal Problem: Pneumonia due to COVID-19 virus [U07.1,J12.82]                 Active Insurance as of 1/3/2022     Primary Coverage     Payor Plan Insurance Group Employer/Plan Group    [facility] [facility] [id number]     Payor Plan Address Payor Plan Phone Number Payor Plan Fax Number Effective Dates    [address] [phone]  7/1/2021 - None Entered    [facility] [id number]       Subscriber Name Subscriber Birth Date Member ID       SHANT MOREJON 1960 [id number]                 Emergency Contacts      (Rel.) Home Phone Work Phone Mobile Phone    Mary Jane Morejon (Spouse) [phone] -- --              [facility] Encounter Date/Time: 1/3/2022 [facility]   Hospital Account: [id number]    MRN: [id number]   Patient:  Shant Morejon   Contact Serial #: [id number]   SSN:          ENCOUNTER             Patient Class: Inpatient   Unit: [address] Service: Medicine     Bed: 444/1   Admitting Provider: Jose Alfredo Estrada MD   Referring Physician: Provider, No " Known   Attending Provider: Jose Alfredo Estrada MD   Adm Diagnosis: Pneumonia due to COVID-1*               PATIENT          Name: Shant Morejon : 1960 (61 yrs)   Address: [address] [place] [address] Sex: Male   City: [place] [address]   County: [facility]   Marital Status:  Ethnicity: NOT                                                                              Race: WHITE   Primary Care Provider: Provider, No Known Patients Phone: Home Phone: [phone]     Mobile Phone: [phone]   EMERGENCY CONTACT   Contact Name Legal Guardian? Relationship to Patient Home Phone Work Phone   1. Mary Jane Morejon  2. *No Contact Specified*      Spouse    [phone]              GUARANTOR            Guarantor: Shant Morejon     : 1960   Address:  [address] Sex: Male     [place] [address]     Relation to Patient: Self       Home Phone: [phone]   Guarantor ID: [id number]       Work Phone:     GUARANTOR EMPLOYER   Employer:           Status: RETIRED   COVERAGE          PRIMARY INSURANCE   Payor: [employer] Plan: [employer] [facility] EMPL[employer]EE   Group Number: [id number] Insurance Type: INDEMNITY   Subscriber Name: SHANT MOREJON Subscriber : 1960   Subscriber ID: [id number] Coverage Address: [facility] [address]  [place] [address]   Pat. Rel. to Subscriber: Self Coverage Phone: [phone]   SECONDARY INSURANCE   Payor: N/A Plan: N/A   Group Number:   Insurance Type:     Subscriber Name:   Subscriber :     Subscriber ID:   Coverage Address:     Pat. Rel. to Subscriber:   Coverage Phone:        Contact Serial # ([id number])  `       January 10, 2022    Chart ID ([id number])                  Encounter Information    Encounter Information    Department Encounter #   1/3/2022  7:58 AM  PAD 4B [id number]   Geoffrey Newsome DO   Physician   Medicine   Progress Notes      Signed   Date of Service:  22   Creation Time:  22              Signed                   Show:Clear  all  [x]Manual[x]Template[x]Copied    Added by:  [x]Geoffrey Newsome DO      []Marion for details           [facility] Medicine Services  INPATIENT PROGRESS NOTE     Length of Stay: 6  Date of Admission: 1/3/2022  Primary Care Physician: Provider, No Known     Subjective      Chief Complaint:      Shortness of breath     HPI      The patient has been afebrile over the past 24 hours.  Repeat blood cultures by infectious diseases show no growth at 24 hours.  Patient indicates that he is feeling much better and is stronger.  He is very anxious to be discharged home and would like to go home tomorrow.  He currently is on 4 L with saturations in the 95-96 range.  I will ask for a walking oximetry tomorrow morning for assessment of his oxygen requirement with exertion.        Review of Systems      All pertinent negatives and positives are as above. All other systems have been reviewed and are negative unless otherwise stated.      Objective    Temp:  [97.3 °F (36.3 °C)-98.4 °F (36.9 °C)] 97.9 °F (36.6 °C)  Heart Rate:  [76-97] 91  Resp:  [16-20] 18  BP: (124-150)/(68-88) 124/70              Lab Results (last 24 hours)      Procedure Component Value Units Date/Time     Blood Culture With NICOLLE - Blood, Arm, Left [[id number]]  (Normal) Collected: 01/08/22 1127     Specimen: Blood from Arm, Left Updated: 01/09/22 1215       Blood Culture No growth at 24 hours     Blood Culture With NICOLLE - Blood, Hand, Right [[id number]]  (Normal) Collected: 01/08/22 1127     Specimen: Blood from Hand, Right Updated: 01/09/22 1215       Blood Culture No growth at 24 hours                 Imaging Results (Last 24 Hours)      ** No results found for the last 24 hours. **                Intake/Output Summary (Last 24 hours) at 1/9/2022 1750  Last data filed at 1/9/2022 1635      Gross per 24 hour   Intake 720 ml   Output 1125 ml   Net -405 ml         Physical Exam  Constitutional:       General: He is not in acute  distress.     Appearance: Normal appearance. He is normal weight.   HENT:      Head: Normocephalic and atraumatic.      Nose: Nose normal.      Mouth: Mucous membranes are moist.      Pharynx: Oropharynx is clear.    Eyes:      Conjunctiva/sclera: Conjunctivae normal.   Cardiovascular:      Rate and Rhythm: Normal rate and regular rhythm.      Pulses: Normal pulses.      Heart sounds: Normal heart sounds.   Pulmonary:      Effort: Pulmonary effort is normal.      Breath sounds: Normal breath sounds. No wheezing, rhonchi or rales.   Abdominal:      General: Abdomen is flat. Bowel sounds are normal. There is no distension.      Palpations: Abdomen is soft.      Tenderness: There is no abdominal tenderness.   Musculoskeletal:         General: No tenderness. Normal range of motion.      Right lower leg: No edema.      Left lower leg: No edema.   Skin:     General: Skin is warm and dry. .      Findings: No rash.   Neurological:      General: No focal deficit present.      Mental Status: He is alert and oriented to person, place, and time. Mental status is at baseline.    Psychiatric:         Mood and Affect: Mood normal.         Behavior: Behavior normal.         Thought Content: Thought content normal.      Results Review:  I have reviewed the labs, radiology results, and diagnostic studies since my last progress note and made treatment changes reflective of the results.   I have reviewed the current medications.     Assessment/Plan           Active Hospital Problems     Diagnosis     • **Pneumonia due to COVID-19 virus     • Acute respiratory failure with hypoxia (HCC)           PLAN:  Walking oximetry in a.m.  Continue dexamethasone day #6 of 10  Continue adjunctive treatments  Antibiotics per infectious diseases  Likely discharge tomorrow     Electronically signed by Geoffrey Newsome DO, 01/09/22, 17:50 CST.                        ED to Hosp-Admission (Current) on 1/3/2022           ED to Hosp-Admission (Current)  on 1/3/2022       Signed                 Show:Clear all  []Manual[x]Template[]Copied    Added by:  [x]Keiko Toscano LPN      []Marion for details    Goal Outcome Evaluation:  Plan of Care Reviewed With: patient  Progress: improving  Outcome Summary: O2 downto 3l/m up adlib no c/o pain no temp ,  poss d/c home today   no falls or injuries                Patient Name:Shant Arevalo   MRN: [id number]   Date: 01/10/22                                                                                                     ROOM AIR BASELINE   SpO2% 91   Heart Rate 85   Blood Pressure       EXERCISE ON ROOM AIR SpO2% EXERCISE ON O2 @  4 LPM SpO2%   1 MINUTE      84 1 MINUTE    91   2 MINUTES   2 MINUTES    90   3 MINUTES   3 MINUTES    90   4 MINUTES   4 MINUTES     5 MINUTES   5 MINUTES     6 MINUTES   6 MINUTES                                                      Patient Name: Shant Arevalo                       MRN: [id number]  Today's Date: 1/10/2022                     Admit Date: 1/3/2022             Discharge Plan            Row Name 01/10/22 1325             Plan      Plan Home Oxygen      Final Discharge Disposition Code 01 - home or self-care      Final Note Patient is discharged today with orders for home oxygen. Patient/wife have selected [facility]. LOIS called [facility] and notified of new referral. LOIS faxed referral to [facility] at [phone]. LOIS advised that patient will need portable tank delivered to room.                                    Expected Discharge Date and Time      Expected Discharge Date Expected Discharge Time     James 10, 2022            01/10/22 1308 98.6 (37) 91 22 132/76 Sitting nasal cannula 4 96   01/10/22 0811 98.6 (37) 78 20 138/78 Lying nasal cannula 4 93   01/10/22 0800 -- -- -- -- -- nasal cannula 4 --   01/10/22 0446 98 (36.7) 84 18 130/80 Lying nasal cannula 3 96   01/10/22                                  Current Facility-Administered Medications   Medication Dose Route Frequency Provider Last  Rate Last Admin   • acetaminophen (TYLENOL) tablet 650 mg  650 mg Oral Q4H PRN Geoffrey Newsome DO       • aluminum-magnesium hydroxide-simethicone (MAALOX MAX) 400-400-40 MG/5ML suspension 15 mL  15 mL Oral Q6H PRN Geoffrey Newsome DO       • ascorbic acid (VITAMIN C) tablet 1,000 mg  1,000 mg Oral Daily eGoffrey Newsome DO   1,000 mg at 01/10/22 0814   • sennosides-docusate (PERICOLACE) 8.6-50 MG per tablet 2 tablet  2 tablet Oral BID PRN Geoffrey Newsome DO   2 tablet at 01/07/22 1715    And   • polyethylene glycol (MIRALAX) packet 17 g  17 g Oral Daily PRN Geoffrey Newsome DO        And   • bisacodyl (DULCOLAX) EC tablet 5 mg  5 mg Oral Daily PRN Geoffrey Newsome DO   5 mg at 01/08/22 0843    And   • bisacodyl (DULCOLAX) suppository 10 mg  10 mg Rectal Daily PRN Geoffrey Newsome DO       • busPIRone (BUSPAR) tablet 5 mg  5 mg Oral BID Geoffrey Newsome DO   5 mg at 01/10/22 0018   • cetirizine (zyrTEC) tablet 10 mg  10 mg Oral Daily Geoffrey Newsome DO   10 mg at 01/10/22 0814   • cholecalciferol (VITAMIN D3) tablet 1,000 Units  1,000 Units Oral Daily Geoffrey Newsome DO   1,000 Units at 01/10/22 0814   • dexamethasone (DECADRON) tablet 6 mg  6 mg Oral Daily With Breakfast Geoffrey Newsome DO   6 mg at 01/10/22 0814   • enoxaparin (LOVENOX) syringe 40 mg  40 mg Subcutaneous Q24H Geoffrey Newsome DO   40 mg at 01/09/22 1810   • famotidine (PEPCID) tablet 20 mg  20 mg Oral BID AC Geoffrey Newsome DO   20 mg at 01/10/22 0814   • FLUoxetine (PROzac) capsule 10 mg  10 mg Oral Daily Geoffrey Newsome DO   10 mg at 01/08/22 0847   • fluticasone (FLONASE) 50 MCG/ACT nasal spray 1 spray  1 spray Each Nare BID Geoffrey Newsome DO   1 spray at 01/10/22 0816   • guaiFENesin (MUCINEX) 12 hr tablet 1,200 mg  1,200 mg Oral Q12H Geoffrey Newsome DO   1,200 mg at 01/10/22 0814   • melatonin tablet 6 mg  6 mg Oral Nightly Geoffrey Newsome DO   6 mg at 01/06/22 4334    • mupirocin (BACTROBAN) 2 % nasal ointment   Each Nare BID Juvenal Rollins MD   1 application at 01/10/22 0815   • ondansetron (ZOFRAN) injection 4 mg  4 mg Intravenous Q6H PRN Geoffrey Newsome DO       • QUEtiapine fumarate ER (SEROquel XR) tablet 50 mg  50 mg Oral Nightly Geoffrey Newsome DO   50 mg at 01/09/22 2107   • sodium chloride 0.9 % flush 10 mL  10 mL Intravenous PRN Geoffrey Newsome DO       • sodium chloride 0.9 % flush 10 mL  10 mL Intravenous Q12H Geoffrey Newsome DO   10 mL at 01/09/22 2107   • sodium chloride nasal spray 2 spray  2 spray Each Nare PRN Judy Magallon MD   2 spray at 01/08/22 1720   • tamsulosin (FLOMAX) 24 hr capsule 0.4 mg  0.4 mg Oral Daily Geoffrey Newsome DO   0.4 mg at 01/08/22 0844   • thiamine (VITAMIN B-1) tablet 100 mg  100 mg Oral Daily Geoffrey Newsome DO   100 mg at 01/10/22 0815   • zinc sulfate (ZINCATE) capsule 220 mg  220 mg Oral Daily Geoffrey Newsome DO   220 mg at 01/10/22 0815

## 2022-01-10 NOTE — PLAN OF CARE
Goal Outcome Evaluation:  Plan of Care Reviewed With: patient        Progress: improving  Outcome Summary: O2 downto 3l/m up adlib no c/o pain no temp ,  poss d/c home today   no falls or injuries.

## 2022-01-10 NOTE — OUTREACH NOTE
Prep Survey      Responses   [facility] facility patient discharged from? [facility]   Is LACE score < 7 ? No   Emergency Room discharge w/ pulse ox? No   Eligibility [facility]   Date of Admission 01/03/22   Date of Discharge 01/10/22   Discharge Disposition Home or Self Care   Discharge diagnosis Covid Pneumonia   Does the patient have one of the following disease processes/diagnoses(primary or secondary)? COVID-19   Does the patient have Home health ordered? No   Is there a DME ordered? Yes   What DME was ordered? [facility]   Comments regarding appointments Please see AVS   Prep survey completed? Yes          Donna Peters RN

## 2022-01-10 NOTE — TELEPHONE ENCOUNTER
Caller: RN     Relationship to patient: RN         New or established patient?  [x] New  [] Established    Date of discharge: 1/10/22    Facility discharged from: [facility]     Diagnosis/Symptoms: COVID-19

## 2022-01-10 NOTE — PROGRESS NOTES
Continued Stay Note  [facility]     Patient Name: Shant Arevalo  MRN: [id number]  Today's Date: 1/10/2022    Admit Date: 1/3/2022     Discharge Plan     Row Name 01/10/22 1325       Plan    Plan Home Oxygen    Final Discharge Disposition Code 01 - home or self-care    Final Note Patient is discharged today with orders for home oxygen. Patient/wife have selected [facility]. LOIS called [facility] and notified of new referral. SW faxed referral to [facility] at [phone]. SW advised that patient will need portable tank delivered to room.                       Expected Discharge Date and Time     Expected Discharge Date Expected Discharge Time    James 10, 2022             KARLI Raymond

## 2022-01-10 NOTE — DISCHARGE SUMMARY
[facility] Medicine Services  DISCHARGE SUMMARY       Date of Admission: 1/3/2022  Date of Discharge:  1/10/2022  Primary Care Physician: Provider, No Known    Presenting Problem/History of Present Illness:  Shortness of breath    Final Discharge Diagnoses:  Active Hospital Problems    Diagnosis    • **Pneumonia due to COVID-19 virus    • Cytokine release syndrome, grade 1    • Acute respiratory failure with hypoxia (HCC)        Consults: Infectious Disease    Procedures Performed: None    Pertinent Test Results:       Imaging Results (Last 7 Days)     Procedure Component Value Units Date/Time    CT Chest Without Contrast Diagnostic [[id number]] Collected: 01/08/22 1012     Updated: 01/08/22 1017    Narrative:      CT CHEST WO CONTRAST DIAGNOSTIC- 1/8/2022 9:06 AM CST     HISTORY: Respiratory illness, nondiagnostic xray; U07.1-COVID-19;  J12.82-Pneumonia due to coronavirus disease 2019; R09.02-Hypoxemia     COMPARISON: CT chest dated 1/3/2022     DOSE LENGTH PRODUCT: 324 mGy cm. Automated exposure control was also  utilized to decrease patient radiation dose.     TECHNIQUE: Serial helical tomographic images of the chest were acquired.  Multiplanar reformatted images were provided for review.     FINDINGS:  The imaged portion of the neck and thyroid gland is unremarkable.      Worsening bilateral lung parenchymal opacities are most concerning for  Covid 19 infection. Trace bilateral pleural effusions.. The trachea and  bronchial tree are patent.     The heart, great vessels, and pulmonary vessels are normal in appearance  within limits of a noncontrast study. There is no pericardial effusion.  No enlarged axillary or mediastinal lymph nodes are present.      No acute findings are seen in the bones or surrounding soft tissues.     No acute findings are seen in the visualized portion of the upper  abdomen.       Impression:      1. Worsening lung parenchymal opacities.        This  report was finalized on 01/08/2022 10:14 by Dr. Margarita Hoover MD.          Lab Results (last 7 days)     Procedure Component Value Units Date/Time    Blood Culture With NICOLLE - Blood, Arm, Left [[id number]]  (Normal) Collected: 01/08/22 1127    Specimen: Blood from Arm, Left Updated: 01/10/22 1215     Blood Culture No growth at 2 days    Blood Culture With NICOLLE - Blood, Hand, Right [[id number]]  (Normal) Collected: 01/08/22 1127    Specimen: Blood from Hand, Right Updated: 01/10/22 1215     Blood Culture No growth at 2 days    Comprehensive Metabolic Panel [[id number]]  (Abnormal) Collected: 01/10/22 0355    Specimen: Blood Updated: 01/10/22 0442     Glucose 145 mg/dL      BUN 22 mg/dL      Creatinine 1.14 mg/dL      Sodium 141 mmol/L      Potassium 4.6 mmol/L      Chloride 105 mmol/L      CO2 28.0 mmol/L      Calcium 8.3 mg/dL      Total Protein 6.6 g/dL      Albumin 3.20 g/dL      ALT (SGPT) 148 U/L      AST (SGOT) 65 U/L      Alkaline Phosphatase 144 U/L      Total Bilirubin 0.5 mg/dL      eGFR Non African Amer 66 mL/min/1.73      Globulin 3.4 gm/dL      A/G Ratio 0.9 g/dL      BUN/Creatinine Ratio 19.3     Anion Gap 8.0 mmol/L     Narrative:      GFR Normal >60  Chronic Kidney Disease <60  Kidney Failure <15      C-reactive Protein [[id number]]  (Abnormal) Collected: 01/10/22 0355    Specimen: Blood Updated: 01/10/22 0442     C-Reactive Protein 6.26 mg/dL     Ferritin [[id number]]  (Abnormal) Collected: 01/10/22 0355    Specimen: Blood Updated: 01/10/22 0442     Ferritin 1,542.00 ng/mL     Narrative:      Results may be falsely decreased if patient taking Biotin.      CBC & Differential [[id number]]  (Abnormal) Collected: 01/10/22 0355    Specimen: Blood Updated: 01/10/22 0417    Narrative:      The following orders were created for panel order CBC & Differential.  Procedure                               Abnormality         Status                     ---------                               -----------         ------                      CBC Auto Differential[[id number]]        Abnormal            Final result                 Please view results for these tests on the individual orders.    CBC Auto Differential [[id number]]  (Abnormal) Collected: 01/10/22 0355    Specimen: Blood Updated: 01/10/22 0417     WBC 11.16 10*3/mm3      RBC 4.65 10*6/mm3      Hemoglobin 14.5 g/dL      Hematocrit 42.7 %      MCV 91.8 fL      MCH 31.2 pg      MCHC 34.0 g/dL      RDW 12.6 %      RDW-SD 42.7 fl      MPV 9.6 fL      Platelets 367 10*3/mm3      Neutrophil % 80.4 %      Lymphocyte % 10.4 %      Monocyte % 5.3 %      Eosinophil % 1.0 %      Basophil % 0.3 %      Immature Grans % 2.6 %      Neutrophils, Absolute 8.98 10*3/mm3      Lymphocytes, Absolute 1.16 10*3/mm3      Monocytes, Absolute 0.59 10*3/mm3      Eosinophils, Absolute 0.11 10*3/mm3      Basophils, Absolute 0.03 10*3/mm3      Immature Grans, Absolute 0.29 10*3/mm3      nRBC 0.0 /100 WBC     Blood Culture - Blood, Arm, Right [[id number]]  (Normal) Collected: 01/03/22 0825    Specimen: Blood from Arm, Right Updated: 01/08/22 1000     Blood Culture No growth at 5 days    Blood Culture - Blood, Arm, Left [[id number]]  (Normal) Collected: 01/03/22 0824    Specimen: Blood from Arm, Left Updated: 01/08/22 1000     Blood Culture No growth at 5 days    Manual Differential [[id number]]  (Abnormal) Collected: 01/08/22 0247    Specimen: Blood Updated: 01/08/22 0418     Neutrophil % 86.1 %      Lymphocyte % 9.9 %      Monocyte % 2.0 %      Bands %  1.0 %      Atypical Lymphocyte % 1.0 %      Neutrophils Absolute 9.63 10*3/mm3      Lymphocytes Absolute 1.20 10*3/mm3      Monocytes Absolute 0.22 10*3/mm3      RBC Morphology Normal     Vacuolated Neutrophils Slight/1+     Platelet Morphology Normal    Ferritin [[id number]]  (Abnormal) Collected: 01/08/22 0247    Specimen: Blood Updated: 01/08/22 0403     Ferritin 1,078.00 ng/mL     Narrative:      Results may be falsely decreased if patient taking Biotin.       C-reactive Protein [[id number]]  (Abnormal) Collected: 01/08/22 0247    Specimen: Blood Updated: 01/08/22 0402     C-Reactive Protein 9.60 mg/dL     Comprehensive Metabolic Panel [[id number]]  (Abnormal) Collected: 01/08/22 0247    Specimen: Blood Updated: 01/08/22 0400     Glucose 124 mg/dL      BUN 23 mg/dL      Creatinine 1.24 mg/dL      Sodium 140 mmol/L      Potassium 4.6 mmol/L      Chloride 100 mmol/L      CO2 29.0 mmol/L      Calcium 8.7 mg/dL      Total Protein 6.9 g/dL      Albumin 3.30 g/dL      ALT (SGPT) 207 U/L      AST (SGOT) 88 U/L      Alkaline Phosphatase 185 U/L      Total Bilirubin 1.2 mg/dL      eGFR Non African Amer 60 mL/min/1.73      Globulin 3.6 gm/dL      A/G Ratio 0.9 g/dL      BUN/Creatinine Ratio 18.5     Anion Gap 11.0 mmol/L     Narrative:      GFR Normal >60  Chronic Kidney Disease <60  Kidney Failure <15      CBC & Differential [[id number]]  (Abnormal) Collected: 01/08/22 0247    Specimen: Blood Updated: 01/08/22 0336    Narrative:      The following orders were created for panel order CBC & Differential.  Procedure                               Abnormality         Status                     ---------                               -----------         ------                     CBC Auto Differential[[id number]]        Abnormal            Final result                 Please view results for these tests on the individual orders.    CBC Auto Differential [[id number]]  (Abnormal) Collected: 01/08/22 0247    Specimen: Blood Updated: 01/08/22 0336     WBC 11.05 10*3/mm3      RBC 5.12 10*6/mm3      Hemoglobin 16.0 g/dL      Hematocrit 48.2 %      MCV 94.1 fL      MCH 31.3 pg      MCHC 33.2 g/dL      RDW 12.7 %      RDW-SD 43.8 fl      MPV 10.0 fL      Platelets 331 10*3/mm3     Comprehensive Metabolic Panel [[id number]]  (Abnormal) Collected: 01/06/22 0436    Specimen: Blood Updated: 01/06/22 0531     Glucose 130 mg/dL      BUN 25 mg/dL      Creatinine 1.23 mg/dL      Sodium 138 mmol/L       Potassium 4.9 mmol/L      Chloride 102 mmol/L      CO2 28.0 mmol/L      Calcium 8.5 mg/dL      Total Protein 6.6 g/dL      Albumin 3.20 g/dL      ALT (SGPT) 128 U/L      AST (SGOT) 64 U/L      Alkaline Phosphatase 131 U/L      Total Bilirubin 0.5 mg/dL      eGFR Non African Amer 60 mL/min/1.73      Globulin 3.4 gm/dL      A/G Ratio 0.9 g/dL      BUN/Creatinine Ratio 20.3     Anion Gap 8.0 mmol/L     Narrative:      GFR Normal >60  Chronic Kidney Disease <60  Kidney Failure <15      C-reactive Protein [[id number]]  (Abnormal) Collected: 01/06/22 0436    Specimen: Blood Updated: 01/06/22 0531     C-Reactive Protein 3.43 mg/dL     Ferritin [[id number]]  (Abnormal) Collected: 01/06/22 0436    Specimen: Blood Updated: 01/06/22 0531     Ferritin 876.00 ng/mL     Narrative:      Results may be falsely decreased if patient taking Biotin.      CBC & Differential [[id number]]  (Abnormal) Collected: 01/06/22 0436    Specimen: Blood Updated: 01/06/22 0507    Narrative:      The following orders were created for panel order CBC & Differential.  Procedure                               Abnormality         Status                     ---------                               -----------         ------                     CBC Auto Differential[[id number]]        Abnormal            Final result                 Please view results for these tests on the individual orders.    CBC Auto Differential [[id number]]  (Abnormal) Collected: 01/06/22 0436    Specimen: Blood Updated: 01/06/22 0507     WBC 9.08 10*3/mm3      RBC 4.84 10*6/mm3      Hemoglobin 15.2 g/dL      Hematocrit 45.3 %      MCV 93.6 fL      MCH 31.4 pg      MCHC 33.6 g/dL      RDW 12.9 %      RDW-SD 44.8 fl      MPV 9.9 fL      Platelets 288 10*3/mm3      Neutrophil % 82.9 %      Lymphocyte % 8.4 %      Monocyte % 6.9 %      Eosinophil % 0.0 %      Basophil % 0.3 %      Immature Grans % 1.5 %      Neutrophils, Absolute 7.52 10*3/mm3      Lymphocytes, Absolute 0.76 10*3/mm3       Monocytes, Absolute 0.63 10*3/mm3      Eosinophils, Absolute 0.00 10*3/mm3      Basophils, Absolute 0.03 10*3/mm3      Immature Grans, Absolute 0.14 10*3/mm3      nRBC 0.0 /100 WBC     Urine Culture - Urine, Urine, Clean Catch [[id number]]  (Normal) Collected: 01/03/22 1316    Specimen: Urine, Clean Catch Updated: 01/04/22 1300     Urine Culture No growth    Comprehensive Metabolic Panel [[id number]]  (Abnormal) Collected: 01/04/22 0639    Specimen: Blood Updated: 01/04/22 0736     Glucose 151 mg/dL      BUN 22 mg/dL      Creatinine 1.28 mg/dL      Sodium 139 mmol/L      Potassium 4.5 mmol/L      Chloride 102 mmol/L      CO2 27.0 mmol/L      Calcium 8.1 mg/dL      Total Protein 6.7 g/dL      Albumin 3.20 g/dL      ALT (SGPT) 105 U/L      AST (SGOT) 47 U/L      Alkaline Phosphatase 117 U/L      Total Bilirubin 0.6 mg/dL      eGFR Non African Amer 58 mL/min/1.73      Globulin 3.5 gm/dL      A/G Ratio 0.9 g/dL      BUN/Creatinine Ratio 17.2     Anion Gap 10.0 mmol/L     Narrative:      GFR Normal >60  Chronic Kidney Disease <60  Kidney Failure <15      Ferritin [[id number]]  (Abnormal) Collected: 01/04/22 0639    Specimen: Blood Updated: 01/04/22 0732     Ferritin 689.30 ng/mL     Narrative:      Results may be falsely decreased if patient taking Biotin.      C-reactive Protein [[id number]]  (Abnormal) Collected: 01/04/22 0639    Specimen: Blood Updated: 01/04/22 0730     C-Reactive Protein 11.09 mg/dL     CBC & Differential [[id number]]  (Abnormal) Collected: 01/04/22 0639    Specimen: Blood Updated: 01/04/22 0711    Narrative:      The following orders were created for panel order CBC & Differential.  Procedure                               Abnormality         Status                     ---------                               -----------         ------                     CBC Auto Differential[[id number]]        Abnormal            Final result                 Please view results for these tests on the individual orders.     CBC Auto Differential [[id number]]  (Abnormal) Collected: 01/04/22 0639    Specimen: Blood Updated: 01/04/22 0711     WBC 8.68 10*3/mm3      RBC 4.80 10*6/mm3      Hemoglobin 14.6 g/dL      Hematocrit 45.1 %      MCV 94.0 fL      MCH 30.4 pg      MCHC 32.4 g/dL      RDW 13.2 %      RDW-SD 45.7 fl      MPV 10.1 fL      Platelets 209 10*3/mm3      Neutrophil % 87.1 %      Lymphocyte % 6.5 %      Monocyte % 4.6 %      Eosinophil % 0.0 %      Basophil % 0.1 %      Neutrophils, Absolute 7.56 10*3/mm3      Lymphocytes, Absolute 0.56 10*3/mm3      Monocytes, Absolute 0.40 10*3/mm3      Eosinophils, Absolute 0.00 10*3/mm3      Basophils, Absolute 0.01 10*3/mm3     MRSA Screen, PCR (Inpatient) - Swab, Nares [[id number]]  (Abnormal) Collected: 01/03/22 2046    Specimen: Swab from Nares Updated: 01/03/22 2258     MRSA PCR MRSA Detected    S. Pneumo Ag Urine or CSF - Urine, Urine, Clean Catch [[id number]]  (Normal) Collected: 01/03/22 2045    Specimen: Urine, Clean Catch Updated: 01/03/22 2221     Strep Pneumo Ag Negative    Legionella Antigen, Urine - Urine, Urine, Clean Catch [[id number]]  (Normal) Collected: 01/03/22 2045    Specimen: Urine, Clean Catch Updated: 01/03/22 2221     LEGIONELLA ANTIGEN, URINE Negative    Procalcitonin [[id number]]  (Normal) Collected: 01/03/22 1353    Specimen: Blood Updated: 01/03/22 1437     Procalcitonin 0.16 ng/mL     Narrative:      As a Marker for Sepsis (Non-Neonates):     1. <0.5 ng/mL represents a low risk of severe sepsis and/or septic shock.  2. >2 ng/mL represents a high risk of severe sepsis and/or septic shock.    As a Marker for Lower Respiratory Tract Infections that require antibiotic therapy:  PCT on Admission     Antibiotic Therapy             6-12 Hrs later  >0.5                          Strongly Recommended            >0.25 - <0.5             Recommended  0.1 - 0.25                  Discouraged                       Remeasure/reassess PCT  <0.1                         Strongly  "Discouraged         Remeasure/reassess PCT      As 28 day mortality risk marker: \"Change in Procalcitonin Result\" (>80% or <=80%) if Day 0 (or Day 1) and Day 4 values are available. Refer to http://www.[facility]-pct-calculator.com/    Change in PCT <=80 %   A decrease of PCT levels below or equal to 80% defines a positive change in PCT test result representing a higher risk for 28-day all-cause mortality of patients diagnosed with severe sepsis or septic shock.    Change in PCT >80 %   A decrease of PCT levels of more than 80% defines a negative change in PCT result representing a lower risk for 28-day all-cause mortality of patients diagnosed with severe sepsis or septic shock.                Ferritin [[id number]]  (Abnormal) Collected: 01/03/22 0824    Specimen: Blood Updated: 01/03/22 1347     Ferritin 652.40 ng/mL     Narrative:      Results may be falsely decreased if patient taking Biotin.      Urinalysis, Microscopic Only - Urine, Clean Catch [[id number]]  (Abnormal) Collected: 01/03/22 1316    Specimen: Urine, Clean Catch Updated: 01/03/22 1344     RBC, UA None Seen /HPF      WBC, UA 6-12 /HPF      Bacteria, UA Trace /HPF      Squamous Epithelial Cells, UA None Seen /HPF      Hyaline Casts, UA None Seen /LPF      Methodology Manual Light Microscopy    Urinalysis With Culture If Indicated - Urine, Clean Catch [[id number]]  (Abnormal) Collected: 01/03/22 1316    Specimen: Urine, Clean Catch Updated: 01/03/22 1344     Color, UA Dark Yellow     Appearance, UA Clear     pH, UA 6.0     Specific Gravity, UA >1.030     Glucose, UA Negative     Ketones, UA Trace     Bilirubin, UA Small (1+)     Blood, UA Negative     Protein,  mg/dL (2+)     Leuk Esterase, UA Trace     Nitrite, UA Negative     Urobilinogen, UA 2.0 E.U./dL          HPI 1/3/2022:  Dr. Newsome:  \"This 61-year-old male presents to the emergency department with a complaint of a persistent fever.  The patient's wife is currently being treated for Covid.  " "He has not been vaccinated for Covid.  He notes symptom onset about 2 weeks ago with associated intermittent fevers off and on.  He presented because he was short of breath and had been continued to have a fever despite exposure 2 weeks ago.  His PCP has prescribed him Augmentin on an outpatient basis without resolution of symptoms.  The patient's symptom onset is outside the window of treatment for remdesivir.     Work-up in the emergency department reveals a normal procalcitonin.  Urinalysis is unremarkable except trace of bacteria and 6-12 WBCs.  ABG show pH 7.487 with PO2 54 on 2 L per nasal cannula.  Covid swab is positive.  Influenza AMB are negative.  Metabolic panel reveals creatinine 1.48 with  and AST 94.  D-dimer elevated at 1.54.  INR, lactate, procalcitonin within normal limits.  CBC is unremarkable except lymphopenia.  Ferritin elevated at 652.4 and CRP elevated at 8.14.\"    Hospital Course:  Patient admitted for COVID-19.  Patient required up to vapotherm in regards to his oxygenation and was able to be weaned down to nasal cannula.      Patient received a 5-day course of azithromycin and ceftriaxone per infectious disease.  MRSA nasal screen, pneumococcal and legionealla urinary antigens were all negative.      Patient received lovenox for prophylactic VTE.    Infectious disease assisted in the management of this patient.    Exercise Oximetry     Patient Name:Shant Arevalo   MRN: [id number]   Date: 01/10/22                                                                                                     ROOM AIR BASELINE   SpO2% 91   Heart Rate 85   Blood Pressure       EXERCISE ON ROOM AIR SpO2% EXERCISE ON O2 @  4 LPM SpO2%   1 MINUTE      84 1 MINUTE    91   2 MINUTES   2 MINUTES    90   3 MINUTES   3 MINUTES    90   4 MINUTES   4 MINUTES     5 MINUTES   5 MINUTES     6 MINUTES   6 MINUTES                                                                                                       " "            Distance Walked   Distance Walked     200 feet   Dyspnea (Spike Scale)   Dyspnea (Spike Scale)   Fatigue (Spike Scale)   Fatigue (Spike Scale)   SpO2% Post Exercise   SpO2% Post Exercise   HR Post Exercise   HR Post Exercise   Time to Recovery   Time to Recovery      Comments: Pt dropped to 84% on room air after walking 15 feet.  Had to place pt on 4 lpm nc oxygen while walking to keep sat above 90%.  RN Gin Smart notified.                      Physical Exam on Discharge:  /78 (BP Location: Right arm, Patient Position: Lying)   Pulse 78   Temp 98.6 °F (37 °C) (Oral)   Resp 20   Ht 182.9 cm (72\")   Wt 84.8 kg (187 lb)   SpO2 93%   BMI 25.36 kg/m²   Physical Exam  Vitals reviewed.   Constitutional:       Appearance: Normal appearance.   HENT:      Head: Normocephalic and atraumatic.      Mouth/Throat:      Mouth: Mucous membranes are dry.      Pharynx: Oropharynx is clear.   Eyes:      General: No scleral icterus.     Conjunctiva/sclera: Conjunctivae normal.   Cardiovascular:      Rate and Rhythm: Normal rate and regular rhythm.      Heart sounds: No murmur heard.      Pulmonary:      Effort: Pulmonary effort is normal. No respiratory distress.      Breath sounds: Normal breath sounds. No stridor.   Abdominal:      General: Abdomen is flat. Bowel sounds are normal.      Palpations: Abdomen is soft.   Skin:     General: Skin is warm and dry.      Coloration: Skin is not jaundiced or pale.   Neurological:      General: No focal deficit present.      Mental Status: He is alert and oriented to person, place, and time.   Psychiatric:         Mood and Affect: Mood normal.         Behavior: Behavior normal.           Condition on Discharge: stable    Discharge Disposition:  Home or Self Care    Discharge Medications:     Discharge Medications      New Medications      Instructions Start Date   benzonatate 100 MG capsule  Commonly known as: Tessalon Perles   100 mg, Oral, 3 Times Daily PRN    "   dexamethasone 6 MG tablet  Commonly known as: DECADRON   6 mg, Oral, Daily With Breakfast   Start Date: January 11, 2022        Continue These Medications      Instructions Start Date   azelastine 0.1 % nasal spray  Commonly known as: Astelin   2 sprays, Nasal, 2 Times Daily, Use in each nostril as directed      cetirizine 10 MG tablet  Commonly known as: zyrTEC   10 mg, Oral, Daily      eszopiclone 2 MG tablet  Commonly known as: LUNESTA   2 mg, Oral, Nightly, Take immediately before bedtime       fluticasone 50 MCG/ACT nasal spray  Commonly known as: FLONASE   1 spray, Nasal, 2 Times Daily      GERITOL PO   1 tablet, Oral, Daily      promethazine 25 MG tablet  Commonly known as: PHENERGAN   25 mg, Oral, Every 6 Hours PRN      pseudoephedrine-guaifenesin  MG per 12 hr tablet  Commonly known as: MUCINEX D   1 tablet, Oral, Every 12 Hours      QUEtiapine fumarate ER 50 MG tablet sustained-release 24 hour tablet  Commonly known as: SEROquel XR   50 mg, Oral, Daily      tamsulosin 0.4 MG capsule 24 hr capsule  Commonly known as: FLOMAX   Use two 0.4 mg daily.      Testosterone Cypionate 200 MG/ML kit   0.75 mL, Intramuscular, Weekly      trospium 20 MG tablet  Commonly known as: SANCTURA   20 mg, Oral, 2 Times Daily Before Meals           Discharge Diet:   Activity at Discharge:     Follow-up Appointments:   Future Appointments   Date Time Provider Department Center   2/15/2022  8:00 AM Minh Burgess PA [facility]       Test Results Pending at Discharge: Noner    Electronically signed by Jose Alfredo Estrada MD, 01/10/22, 13:04 CST.    Time: 35 minutes.

## 2022-01-11 ENCOUNTER — TRANSITIONAL CARE MANAGEMENT TELEPHONE ENCOUNTER (OUTPATIENT)
Dept: CALL CENTER | Facility: HOSPITAL | Age: 62
End: 2022-01-11

## 2022-01-11 NOTE — OUTREACH NOTE
Call Center TCM Note      Responses   [facility] patient discharged from? [facility]   Does the patient have one of the following disease processes/diagnoses(primary or secondary)? COVID-19   COVID-19 underlying condition? None   TCM attempt successful? No   Unsuccessful attempts Attempt 2   Discharge diagnosis Covid Pneumonia          Areli Chaves RN    1/11/2022, 14:43 EST

## 2022-01-11 NOTE — PAYOR COMM NOTE
"REF: [id number]    [facility]   ABDIRAHMAN,   [phone]  OR  FAX   [phone]        Shant Morejon (61 y.o. Male)             Date of Birth Social Security Number Address Home Phone MRN    1960  [id number] [address] [phone] [id number]    Christianity Marital Status             Other        Admission Date Admission Type Admitting Provider Attending Provider Department, Room/Bed    1/3/22 Emergency Jose Alfredo Estrada MD  [facility] 4B, 444/1    Discharge Date Discharge Disposition Discharge Destination          1/10/2022 Home or Self Care              Attending Provider: (none)   Allergies: No Known Allergies    Isolation: None   Infection: COVID (confirmed) (01/03/22), MRSA (01/03/22)   Code Status: Prior   Advance Care Planning Activity    Ht: 182.9 cm (72\")   Wt: 84.8 kg (187 lb)    Admission Cmt: None   Principal Problem: Pneumonia due to COVID-19 virus [U07.1,J12.82]                 Active Insurance as of 1/3/2022     Primary Coverage     Payor Plan Insurance Group Employer/Plan Group    [employer] [place] [employer] [id number]     Payor Plan Address Payor Plan Phone Number Payor Plan Fax Number Effective Dates    [address] [phone]  7/1/2021 - None Entered    [facility] [id number]       Subscriber Name Subscriber Birth Date Member ID       SHANT MOREJON 1960 [id number]                 Emergency Contacts      (Rel.) Home Phone Work Phone Mobile Phone    MorejnoMary Jane pablo (Spouse) [phone] -- --               Discharge Summary      Jose Alfredo Estrada MD at 01/10/22 1304                [facility] Medicine Services  DISCHARGE SUMMARY       Date of Admission: 1/3/2022  Date of Discharge:  1/10/2022  Primary Care Physician: Provider, No Known    Presenting Problem/History of Present Illness:  Shortness of breath    Final Discharge Diagnoses:  Active Hospital Problems    Diagnosis    • " **Pneumonia due to COVID-19 virus    • Cytokine release syndrome, grade 1    • Acute respiratory failure with hypoxia (HCC)        Consults: Infectious Disease    Procedures Performed: None    Pertinent Test Results:       Imaging Results (Last 7 Days)     Procedure Component Value Units Date/Time    CT Chest Without Contrast Diagnostic [[id number]] Collected: 01/08/22 1012     Updated: 01/08/22 1017    Narrative:      CT CHEST WO CONTRAST DIAGNOSTIC- 1/8/2022 9:06 AM CST     HISTORY: Respiratory illness, nondiagnostic xray; U07.1-COVID-19;  J12.82-Pneumonia due to coronavirus disease 2019; R09.02-Hypoxemia     COMPARISON: CT chest dated 1/3/2022     DOSE LENGTH PRODUCT: 324 mGy cm. Automated exposure control was also  utilized to decrease patient radiation dose.     TECHNIQUE: Serial helical tomographic images of the chest were acquired.  Multiplanar reformatted images were provided for review.     FINDINGS:  The imaged portion of the neck and thyroid gland is unremarkable.      Worsening bilateral lung parenchymal opacities are most concerning for  Covid 19 infection. Trace bilateral pleural effusions.. The trachea and  bronchial tree are patent.     The heart, great vessels, and pulmonary vessels are normal in appearance  within limits of a noncontrast study. There is no pericardial effusion.  No enlarged axillary or mediastinal lymph nodes are present.      No acute findings are seen in the bones or surrounding soft tissues.     No acute findings are seen in the visualized portion of the upper  abdomen.       Impression:      1. Worsening lung parenchymal opacities.        This report was finalized on 01/08/2022 10:14 by Dr. Margarita Hoover MD.          Lab Results (last 7 days)     Procedure Component Value Units Date/Time    Blood Culture With NICOLLE - Blood, Arm, Left [[id number]]  (Normal) Collected: 01/08/22 1127    Specimen: Blood from Arm, Left Updated: 01/10/22 1215     Blood Culture No growth at 2 days     Blood Culture With NICOLLE - Blood, Hand, Right [[id number]]  (Normal) Collected: 01/08/22 1127    Specimen: Blood from Hand, Right Updated: 01/10/22 1215     Blood Culture No growth at 2 days    Comprehensive Metabolic Panel [[id number]]  (Abnormal) Collected: 01/10/22 0355    Specimen: Blood Updated: 01/10/22 0442     Glucose 145 mg/dL      BUN 22 mg/dL      Creatinine 1.14 mg/dL      Sodium 141 mmol/L      Potassium 4.6 mmol/L      Chloride 105 mmol/L      CO2 28.0 mmol/L      Calcium 8.3 mg/dL      Total Protein 6.6 g/dL      Albumin 3.20 g/dL      ALT (SGPT) 148 U/L      AST (SGOT) 65 U/L      Alkaline Phosphatase 144 U/L      Total Bilirubin 0.5 mg/dL      eGFR Non African Amer 66 mL/min/1.73      Globulin 3.4 gm/dL      A/G Ratio 0.9 g/dL      BUN/Creatinine Ratio 19.3     Anion Gap 8.0 mmol/L     Narrative:      GFR Normal >60  Chronic Kidney Disease <60  Kidney Failure <15      C-reactive Protein [[id number]]  (Abnormal) Collected: 01/10/22 0355    Specimen: Blood Updated: 01/10/22 0442     C-Reactive Protein 6.26 mg/dL     Ferritin [[id number]]  (Abnormal) Collected: 01/10/22 0355    Specimen: Blood Updated: 01/10/22 0442     Ferritin 1,542.00 ng/mL     Narrative:      Results may be falsely decreased if patient taking Biotin.      CBC & Differential [[id number]]  (Abnormal) Collected: 01/10/22 0355    Specimen: Blood Updated: 01/10/22 0417    Narrative:      The following orders were created for panel order CBC & Differential.  Procedure                               Abnormality         Status                     ---------                               -----------         ------                     CBC Auto Differential[[id number]]        Abnormal            Final result                 Please view results for these tests on the individual orders.    CBC Auto Differential [[id number]]  (Abnormal) Collected: 01/10/22 0355    Specimen: Blood Updated: 01/10/22 0417     WBC 11.16 10*3/mm3      RBC 4.65 10*6/mm3       Hemoglobin 14.5 g/dL      Hematocrit 42.7 %      MCV 91.8 fL      MCH 31.2 pg      MCHC 34.0 g/dL      RDW 12.6 %      RDW-SD 42.7 fl      MPV 9.6 fL      Platelets 367 10*3/mm3      Neutrophil % 80.4 %      Lymphocyte % 10.4 %      Monocyte % 5.3 %      Eosinophil % 1.0 %      Basophil % 0.3 %      Immature Grans % 2.6 %      Neutrophils, Absolute 8.98 10*3/mm3      Lymphocytes, Absolute 1.16 10*3/mm3      Monocytes, Absolute 0.59 10*3/mm3      Eosinophils, Absolute 0.11 10*3/mm3      Basophils, Absolute 0.03 10*3/mm3      Immature Grans, Absolute 0.29 10*3/mm3      nRBC 0.0 /100 WBC     Blood Culture - Blood, Arm, Right [[id number]]  (Normal) Collected: 01/03/22 0825    Specimen: Blood from Arm, Right Updated: 01/08/22 1000     Blood Culture No growth at 5 days    Blood Culture - Blood, Arm, Left [[id number]]  (Normal) Collected: 01/03/22 0824    Specimen: Blood from Arm, Left Updated: 01/08/22 1000     Blood Culture No growth at 5 days    Manual Differential [[id number]]  (Abnormal) Collected: 01/08/22 0247    Specimen: Blood Updated: 01/08/22 0418     Neutrophil % 86.1 %      Lymphocyte % 9.9 %      Monocyte % 2.0 %      Bands %  1.0 %      Atypical Lymphocyte % 1.0 %      Neutrophils Absolute 9.63 10*3/mm3      Lymphocytes Absolute 1.20 10*3/mm3      Monocytes Absolute 0.22 10*3/mm3      RBC Morphology Normal     Vacuolated Neutrophils Slight/1+     Platelet Morphology Normal    Ferritin [[id number]]  (Abnormal) Collected: 01/08/22 0247    Specimen: Blood Updated: 01/08/22 0403     Ferritin 1,078.00 ng/mL     Narrative:      Results may be falsely decreased if patient taking Biotin.      C-reactive Protein [[id number]]  (Abnormal) Collected: 01/08/22 0247    Specimen: Blood Updated: 01/08/22 0402     C-Reactive Protein 9.60 mg/dL     Comprehensive Metabolic Panel [[id number]]  (Abnormal) Collected: 01/08/22 0247    Specimen: Blood Updated: 01/08/22 0400     Glucose 124 mg/dL      BUN 23 mg/dL      Creatinine 1.24  mg/dL      Sodium 140 mmol/L      Potassium 4.6 mmol/L      Chloride 100 mmol/L      CO2 29.0 mmol/L      Calcium 8.7 mg/dL      Total Protein 6.9 g/dL      Albumin 3.30 g/dL      ALT (SGPT) 207 U/L      AST (SGOT) 88 U/L      Alkaline Phosphatase 185 U/L      Total Bilirubin 1.2 mg/dL      eGFR Non African Amer 60 mL/min/1.73      Globulin 3.6 gm/dL      A/G Ratio 0.9 g/dL      BUN/Creatinine Ratio 18.5     Anion Gap 11.0 mmol/L     Narrative:      GFR Normal >60  Chronic Kidney Disease <60  Kidney Failure <15      CBC & Differential [[id number]]  (Abnormal) Collected: 01/08/22 0247    Specimen: Blood Updated: 01/08/22 0336    Narrative:      The following orders were created for panel order CBC & Differential.  Procedure                               Abnormality         Status                     ---------                               -----------         ------                     CBC Auto Differential[[id number]]        Abnormal            Final result                 Please view results for these tests on the individual orders.    CBC Auto Differential [[id number]]  (Abnormal) Collected: 01/08/22 0247    Specimen: Blood Updated: 01/08/22 0336     WBC 11.05 10*3/mm3      RBC 5.12 10*6/mm3      Hemoglobin 16.0 g/dL      Hematocrit 48.2 %      MCV 94.1 fL      MCH 31.3 pg      MCHC 33.2 g/dL      RDW 12.7 %      RDW-SD 43.8 fl      MPV 10.0 fL      Platelets 331 10*3/mm3     Comprehensive Metabolic Panel [[id number]]  (Abnormal) Collected: 01/06/22 0436    Specimen: Blood Updated: 01/06/22 0531     Glucose 130 mg/dL      BUN 25 mg/dL      Creatinine 1.23 mg/dL      Sodium 138 mmol/L      Potassium 4.9 mmol/L      Chloride 102 mmol/L      CO2 28.0 mmol/L      Calcium 8.5 mg/dL      Total Protein 6.6 g/dL      Albumin 3.20 g/dL      ALT (SGPT) 128 U/L      AST (SGOT) 64 U/L      Alkaline Phosphatase 131 U/L      Total Bilirubin 0.5 mg/dL      eGFR Non African Amer 60 mL/min/1.73      Globulin 3.4 gm/dL      A/G Ratio  0.9 g/dL      BUN/Creatinine Ratio 20.3     Anion Gap 8.0 mmol/L     Narrative:      GFR Normal >60  Chronic Kidney Disease <60  Kidney Failure <15      C-reactive Protein [[id number]]  (Abnormal) Collected: 01/06/22 0436    Specimen: Blood Updated: 01/06/22 0531     C-Reactive Protein 3.43 mg/dL     Ferritin [[id number]]  (Abnormal) Collected: 01/06/22 0436    Specimen: Blood Updated: 01/06/22 0531     Ferritin 876.00 ng/mL     Narrative:      Results may be falsely decreased if patient taking Biotin.      CBC & Differential [[id number]]  (Abnormal) Collected: 01/06/22 0436    Specimen: Blood Updated: 01/06/22 0507    Narrative:      The following orders were created for panel order CBC & Differential.  Procedure                               Abnormality         Status                     ---------                               -----------         ------                     CBC Auto Differential[[id number]]        Abnormal            Final result                 Please view results for these tests on the individual orders.    CBC Auto Differential [[id number]]  (Abnormal) Collected: 01/06/22 0436    Specimen: Blood Updated: 01/06/22 0507     WBC 9.08 10*3/mm3      RBC 4.84 10*6/mm3      Hemoglobin 15.2 g/dL      Hematocrit 45.3 %      MCV 93.6 fL      MCH 31.4 pg      MCHC 33.6 g/dL      RDW 12.9 %      RDW-SD 44.8 fl      MPV 9.9 fL      Platelets 288 10*3/mm3      Neutrophil % 82.9 %      Lymphocyte % 8.4 %      Monocyte % 6.9 %      Eosinophil % 0.0 %      Basophil % 0.3 %      Immature Grans % 1.5 %      Neutrophils, Absolute 7.52 10*3/mm3      Lymphocytes, Absolute 0.76 10*3/mm3      Monocytes, Absolute 0.63 10*3/mm3      Eosinophils, Absolute 0.00 10*3/mm3      Basophils, Absolute 0.03 10*3/mm3      Immature Grans, Absolute 0.14 10*3/mm3      nRBC 0.0 /100 WBC     Urine Culture - Urine, Urine, Clean Catch [[id number]]  (Normal) Collected: 01/03/22 1316    Specimen: Urine, Clean Catch Updated: 01/04/22 1300      Urine Culture No growth    Comprehensive Metabolic Panel [[id number]]  (Abnormal) Collected: 01/04/22 0639    Specimen: Blood Updated: 01/04/22 0736     Glucose 151 mg/dL      BUN 22 mg/dL      Creatinine 1.28 mg/dL      Sodium 139 mmol/L      Potassium 4.5 mmol/L      Chloride 102 mmol/L      CO2 27.0 mmol/L      Calcium 8.1 mg/dL      Total Protein 6.7 g/dL      Albumin 3.20 g/dL      ALT (SGPT) 105 U/L      AST (SGOT) 47 U/L      Alkaline Phosphatase 117 U/L      Total Bilirubin 0.6 mg/dL      eGFR Non African Amer 58 mL/min/1.73      Globulin 3.5 gm/dL      A/G Ratio 0.9 g/dL      BUN/Creatinine Ratio 17.2     Anion Gap 10.0 mmol/L     Narrative:      GFR Normal >60  Chronic Kidney Disease <60  Kidney Failure <15      Ferritin [[id number]]  (Abnormal) Collected: 01/04/22 0639    Specimen: Blood Updated: 01/04/22 0732     Ferritin 689.30 ng/mL     Narrative:      Results may be falsely decreased if patient taking Biotin.      C-reactive Protein [[id number]]  (Abnormal) Collected: 01/04/22 0639    Specimen: Blood Updated: 01/04/22 0730     C-Reactive Protein 11.09 mg/dL     CBC & Differential [[id number]]  (Abnormal) Collected: 01/04/22 0639    Specimen: Blood Updated: 01/04/22 0711    Narrative:      The following orders were created for panel order CBC & Differential.  Procedure                               Abnormality         Status                     ---------                               -----------         ------                     CBC Auto Differential[[id number]]        Abnormal            Final result                 Please view results for these tests on the individual orders.    CBC Auto Differential [[id number]]  (Abnormal) Collected: 01/04/22 0639    Specimen: Blood Updated: 01/04/22 0711     WBC 8.68 10*3/mm3      RBC 4.80 10*6/mm3      Hemoglobin 14.6 g/dL      Hematocrit 45.1 %      MCV 94.0 fL      MCH 30.4 pg      MCHC 32.4 g/dL      RDW 13.2 %      RDW-SD 45.7 fl      MPV 10.1 fL      Platelets  "209 10*3/mm3      Neutrophil % 87.1 %      Lymphocyte % 6.5 %      Monocyte % 4.6 %      Eosinophil % 0.0 %      Basophil % 0.1 %      Neutrophils, Absolute 7.56 10*3/mm3      Lymphocytes, Absolute 0.56 10*3/mm3      Monocytes, Absolute 0.40 10*3/mm3      Eosinophils, Absolute 0.00 10*3/mm3      Basophils, Absolute 0.01 10*3/mm3     MRSA Screen, PCR (Inpatient) - Swab, Nares [[id number]]  (Abnormal) Collected: 01/03/22 2046    Specimen: Swab from Nares Updated: 01/03/22 2258     MRSA PCR MRSA Detected    S. Pneumo Ag Urine or CSF - Urine, Urine, Clean Catch [[id number]]  (Normal) Collected: 01/03/22 2045    Specimen: Urine, Clean Catch Updated: 01/03/22 2221     Strep Pneumo Ag Negative    Legionella Antigen, Urine - Urine, Urine, Clean Catch [[id number]]  (Normal) Collected: 01/03/22 2045    Specimen: Urine, Clean Catch Updated: 01/03/22 2221     LEGIONELLA ANTIGEN, URINE Negative    Procalcitonin [[id number]]  (Normal) Collected: 01/03/22 1353    Specimen: Blood Updated: 01/03/22 1437     Procalcitonin 0.16 ng/mL     Narrative:      As a Marker for Sepsis (Non-Neonates):     1. <0.5 ng/mL represents a low risk of severe sepsis and/or septic shock.  2. >2 ng/mL represents a high risk of severe sepsis and/or septic shock.    As a Marker for Lower Respiratory Tract Infections that require antibiotic therapy:  PCT on Admission     Antibiotic Therapy             6-12 Hrs later  >0.5                          Strongly Recommended            >0.25 - <0.5             Recommended  0.1 - 0.25                  Discouraged                       Remeasure/reassess PCT  <0.1                         Strongly Discouraged         Remeasure/reassess PCT      As 28 day mortality risk marker: \"Change in Procalcitonin Result\" (>80% or <=80%) if Day 0 (or Day 1) and Day 4 values are available. Refer to http://www.[facility]-pct-calculator.com/    Change in PCT <=80 %   A decrease of PCT levels below or equal to 80% defines a positive change in " "PCT test result representing a higher risk for 28-day all-cause mortality of patients diagnosed with severe sepsis or septic shock.    Change in PCT >80 %   A decrease of PCT levels of more than 80% defines a negative change in PCT result representing a lower risk for 28-day all-cause mortality of patients diagnosed with severe sepsis or septic shock.                Ferritin [[id number]]  (Abnormal) Collected: 01/03/22 0824    Specimen: Blood Updated: 01/03/22 1347     Ferritin 652.40 ng/mL     Narrative:      Results may be falsely decreased if patient taking Biotin.      Urinalysis, Microscopic Only - Urine, Clean Catch [[id number]]  (Abnormal) Collected: 01/03/22 1316    Specimen: Urine, Clean Catch Updated: 01/03/22 1344     RBC, UA None Seen /HPF      WBC, UA 6-12 /HPF      Bacteria, UA Trace /HPF      Squamous Epithelial Cells, UA None Seen /HPF      Hyaline Casts, UA None Seen /LPF      Methodology Manual Light Microscopy    Urinalysis With Culture If Indicated - Urine, Clean Catch [[id number]]  (Abnormal) Collected: 01/03/22 1316    Specimen: Urine, Clean Catch Updated: 01/03/22 1344     Color, UA Dark Yellow     Appearance, UA Clear     pH, UA 6.0     Specific Gravity, UA >1.030     Glucose, UA Negative     Ketones, UA Trace     Bilirubin, UA Small (1+)     Blood, UA Negative     Protein,  mg/dL (2+)     Leuk Esterase, UA Trace     Nitrite, UA Negative     Urobilinogen, UA 2.0 E.U./dL          HPI 1/3/2022:  Dr. Newsome:  \"This 61-year-old male presents to the emergency department with a complaint of a persistent fever.  The patient's wife is currently being treated for Covid.  He has not been vaccinated for Covid.  He notes symptom onset about 2 weeks ago with associated intermittent fevers off and on.  He presented because he was short of breath and had been continued to have a fever despite exposure 2 weeks ago.  His PCP has prescribed him Augmentin on an outpatient basis without resolution of " "symptoms.  The patient's symptom onset is outside the window of treatment for remdesivir.     Work-up in the emergency department reveals a normal procalcitonin.  Urinalysis is unremarkable except trace of bacteria and 6-12 WBCs.  ABG show pH 7.487 with PO2 54 on 2 L per nasal cannula.  Covid swab is positive.  Influenza AMB are negative.  Metabolic panel reveals creatinine 1.48 with  and AST 94.  D-dimer elevated at 1.54.  INR, lactate, procalcitonin within normal limits.  CBC is unremarkable except lymphopenia.  Ferritin elevated at 652.4 and CRP elevated at 8.14.\"    Hospital Course:  Patient admitted for COVID-19.  Patient required up to vapotherm in regards to his oxygenation and was able to be weaned down to nasal cannula.      Patient received a 5-day course of azithromycin and ceftriaxone per infectious disease.  MRSA nasal screen, pneumococcal and legionealla urinary antigens were all negative.      Patient received lovenox for prophylactic VTE.    Infectious disease assisted in the management of this patient.    Exercise Oximetry     Patient Name:Shant Arevalo   MRN: [id number]   Date: 01/10/22                                                                                                     ROOM AIR BASELINE   SpO2% 91   Heart Rate 85   Blood Pressure       EXERCISE ON ROOM AIR SpO2% EXERCISE ON O2 @  4 LPM SpO2%   1 MINUTE      84 1 MINUTE    91   2 MINUTES   2 MINUTES    90   3 MINUTES   3 MINUTES    90   4 MINUTES   4 MINUTES     5 MINUTES   5 MINUTES     6 MINUTES   6 MINUTES                                                                                                                   Distance Walked   Distance Walked     200 feet   Dyspnea (Spike Scale)   Dyspnea (Spike Scale)   Fatigue (Spike Scale)   Fatigue (Spike Scale)   SpO2% Post Exercise   SpO2% Post Exercise   HR Post Exercise   HR Post Exercise   Time to Recovery   Time to Recovery      Comments: Pt dropped to 84% on room air after " "walking 15 feet.  Had to place pt on 4 lpm nc oxygen while walking to keep sat above 90%.  RN Gin Smart notified.                      Physical Exam on Discharge:  /78 (BP Location: Right arm, Patient Position: Lying)   Pulse 78   Temp 98.6 °F (37 °C) (Oral)   Resp 20   Ht 182.9 cm (72\")   Wt 84.8 kg (187 lb)   SpO2 93%   BMI 25.36 kg/m²   Physical Exam  Vitals reviewed.   Constitutional:       Appearance: Normal appearance.   HENT:      Head: Normocephalic and atraumatic.      Mouth/Throat:      Mouth: Mucous membranes are dry.      Pharynx: Oropharynx is clear.   Eyes:      General: No scleral icterus.     Conjunctiva/sclera: Conjunctivae normal.   Cardiovascular:      Rate and Rhythm: Normal rate and regular rhythm.      Heart sounds: No murmur heard.      Pulmonary:      Effort: Pulmonary effort is normal. No respiratory distress.      Breath sounds: Normal breath sounds. No stridor.   Abdominal:      General: Abdomen is flat. Bowel sounds are normal.      Palpations: Abdomen is soft.   Skin:     General: Skin is warm and dry.      Coloration: Skin is not jaundiced or pale.   Neurological:      General: No focal deficit present.      Mental Status: He is alert and oriented to person, place, and time.   Psychiatric:         Mood and Affect: Mood normal.         Behavior: Behavior normal.           Condition on Discharge: stable    Discharge Disposition:  Home or Self Care    Discharge Medications:     Discharge Medications      New Medications      Instructions Start Date   benzonatate 100 MG capsule  Commonly known as: Tessalon Perles   100 mg, Oral, 3 Times Daily PRN      dexamethasone 6 MG tablet  Commonly known as: DECADRON   6 mg, Oral, Daily With Breakfast   Start Date: January 11, 2022        Continue These Medications      Instructions Start Date   azelastine 0.1 % nasal spray  Commonly known as: Astelin   2 sprays, Nasal, 2 Times Daily, Use in each nostril as directed      cetirizine 10 " MG tablet  Commonly known as: zyrTEC   10 mg, Oral, Daily      eszopiclone 2 MG tablet  Commonly known as: LUNESTA   2 mg, Oral, Nightly, Take immediately before bedtime       fluticasone 50 MCG/ACT nasal spray  Commonly known as: FLONASE   1 spray, Nasal, 2 Times Daily      GERITOL PO   1 tablet, Oral, Daily      promethazine 25 MG tablet  Commonly known as: PHENERGAN   25 mg, Oral, Every 6 Hours PRN      pseudoephedrine-guaifenesin  MG per 12 hr tablet  Commonly known as: MUCINEX D   1 tablet, Oral, Every 12 Hours      QUEtiapine fumarate ER 50 MG tablet sustained-release 24 hour tablet  Commonly known as: SEROquel XR   50 mg, Oral, Daily      tamsulosin 0.4 MG capsule 24 hr capsule  Commonly known as: FLOMAX   Use two 0.4 mg daily.      Testosterone Cypionate 200 MG/ML kit   0.75 mL, Intramuscular, Weekly      trospium 20 MG tablet  Commonly known as: SANCTURA   20 mg, Oral, 2 Times Daily Before Meals           Discharge Diet:   Activity at Discharge:     Follow-up Appointments:   Future Appointments   Date Time Provider Department Center   2/15/2022  8:00 AM Minh Burgess PA [facility]       Test Results Pending at Discharge: Noner    Electronically signed by Jose Alfredo Estrada MD, 01/10/22, 13:04 CST.    Time: 35 minutes.         Electronically signed by Jose Alfredo Estrada MD at 01/10/22 2111       Discharge Order (From admission, onward)     Start     Ordered    01/10/22 1301  Discharge patient  Once        Expected Discharge Date: 01/10/22    Discharge Disposition: Home or Self Care    Physician of Record for Attribution - Please select from Treatment Team: SREE ZULETA [[id number]]    Review needed by CMO to determine Physician of Record: No       Question Answer Comment   Physician of Record for Attribution - Please select from Treatment Team SREE ZULETA    Review needed by CMO to determine Physician of Record No        01/10/22 2176

## 2022-01-11 NOTE — OUTREACH NOTE
Call Center TCM Note      Responses   [facility] patient discharged from? [place]   Does the patient have one of the following disease processes/diagnoses(primary or secondary)? COVID-19   COVID-19 underlying condition? None   TCM attempt successful? No  [verbal release out of date ]   Unsuccessful attempts Attempt 1   Discharge diagnosis Covid Pneumonia          Areli Chaves RN    1/11/2022, 14:40 EST

## 2022-01-12 ENCOUNTER — TRANSITIONAL CARE MANAGEMENT TELEPHONE ENCOUNTER (OUTPATIENT)
Dept: CALL CENTER | Facility: HOSPITAL | Age: 62
End: 2022-01-12

## 2022-01-12 NOTE — OUTREACH NOTE
Call Center TCM Note      Responses   [facility] patient discharged from? [place]   Does the patient have one of the following disease processes/diagnoses(primary or secondary)? COVID-19   COVID-19 underlying condition? None   TCM attempt successful? Yes   Call start time 0926   Call end time 0928   Discharge diagnosis Covid Pneumonia   Meds reviewed with patient/caregiver? Yes   Is the patient having any side effects they believe may be caused by any medication additions or changes? No   Does the patient have all medications ordered at discharge? Yes   Is the patient taking all medications as directed (includes completed medication regime)? Yes   Does the patient have a primary care provider?  Yes   Comments regarding PCP new patient appt with Dr. Ching on 1/17   Does the patient have an appointment with their PCP or specialist within 7 days of discharge? Yes   Has the patient kept scheduled appointments due by today? N/A   Has home health visited the patient within 72 hours of discharge? N/A   What DME was ordered? [facility] [facility]   Has all DME been delivered? Yes   DME comments 4L of O2 at all times   Psychosocial issues? No   Did the patient receive a copy of their discharge instructions? Yes   Did the patient receive a copy of COVID-19 specific instructions? Yes   Nursing interventions Reviewed instructions with patient   What is the patient's perception of their health status since discharge? Improving   Does the patient have any of the following symptoms? None   Nursing Interventions Nurse provided patient education   Pulse Ox monitoring Intermittent   Pulse Ox device source Patient   O2 Sat comments 97% on 4L of O2   O2 Sat: education provided Sat levels,  Monitoring frequency,  When to seek care   Is the patient/caregiver able to teach back steps to recovery at home? Set small, achievable goals for return to baseline health,  Rest and rebuild strength, gradually increase activity   Is the patient/caregiver  able to teach back the hierarchy of who to call/visit for symptoms/problems? PCP, Specialist, Home health nurse, Urgent Care, ED, 911 Yes   TCM call completed? Yes   Wrap up additional comments Doing well, using his O2 as ordered, confirmed appt with Dr. Ching for 1/17, no questions at this time.          Adrienne Hill RN    1/12/2022, 09:28 EST

## 2022-01-13 ENCOUNTER — READMISSION MANAGEMENT (OUTPATIENT)
Dept: CALL CENTER | Facility: HOSPITAL | Age: 62
End: 2022-01-13

## 2022-01-13 LAB
BACTERIA SPEC AEROBE CULT: NORMAL
BACTERIA SPEC AEROBE CULT: NORMAL

## 2022-01-13 NOTE — OUTREACH NOTE
COVID-19 Week 1 Survey      Responses   [facility] patient discharged from? [place]   Does the patient have one of the following disease processes/diagnoses(primary or secondary)? COVID-19   COVID-19 underlying condition? None   Call Number Call 2   Week 1 Call successful? Yes   Call start time 1417   Call end time 1419   Discharge diagnosis Covid Pneumonia   Person spoke with today (if not patient) and relationship Mary Jane-spouse   Meds reviewed with patient/caregiver? Yes   Is the patient having any side effects they believe may be caused by any medication additions or changes? No   Does the patient have all medications ordered at discharge? Yes   Is the patient taking all medications as directed (includes completed medication regime)? Yes   Does the patient have a primary care provider?  Yes   Comments regarding PCP new patient appt with Dr. Ching on 1/17   Does the patient have an appointment with their PCP or specialist within 7 days of discharge? Yes   Has the patient kept scheduled appointments due by today? N/A   Has home health visited the patient within 72 hours of discharge? N/A   What DME was ordered? [employer] [facility]   DME comments 4LO2   Psychosocial issues? No   What is the patient's perception of their health status since discharge? Improving   Does the patient have any of the following symptoms? None   Nursing Interventions Nurse provided patient education   Pulse Ox monitoring Intermittent   Pulse Ox device source Patient   O2 Sat comments 96% on 4LO2   O2 Sat: education provided Sat levels,  Monitoring frequency   Is the patient/caregiver able to teach back steps to recovery at home? Set small, achievable goals for return to baseline health,  Rest and rebuild strength, gradually increase activity,  Eat a well-balance diet   If the patient is a current smoker, are they able to teach back resources for cessation? Not a smoker   Is the patient/caregiver able to teach back the hierarchy of who to  call/visit for symptoms/problems? PCP, Specialist, Home health nurse, Urgent Care, ED, 911 Yes   COVID-19 call completed? Yes          VIRGIE CASTLE RN

## 2022-01-14 ENCOUNTER — READMISSION MANAGEMENT (OUTPATIENT)
Dept: CALL CENTER | Facility: HOSPITAL | Age: 62
End: 2022-01-14

## 2022-01-14 NOTE — OUTREACH NOTE
COVID-19 Week 1 Survey      Responses   [facility] patient discharged from? [place]   Does the patient have one of the following disease processes/diagnoses(primary or secondary)? COVID-19   COVID-19 underlying condition? None   Call Number Call 3   Week 1 Call successful? No   Discharge diagnosis Covid Pneumonia          France Duran RN

## 2022-01-17 ENCOUNTER — OFFICE VISIT (OUTPATIENT)
Dept: INTERNAL MEDICINE | Facility: CLINIC | Age: 62
End: 2022-01-17

## 2022-01-17 VITALS
OXYGEN SATURATION: 95 % | BODY MASS INDEX: 25.57 KG/M2 | WEIGHT: 188.8 LBS | HEIGHT: 72 IN | RESPIRATION RATE: 16 BRPM | TEMPERATURE: 98.1 F | HEART RATE: 105 BPM | DIASTOLIC BLOOD PRESSURE: 74 MMHG | SYSTOLIC BLOOD PRESSURE: 130 MMHG

## 2022-01-17 DIAGNOSIS — H69.83 ETD (EUSTACHIAN TUBE DYSFUNCTION), BILATERAL: ICD-10-CM

## 2022-01-17 DIAGNOSIS — Z00.01 ANNUAL VISIT FOR GENERAL ADULT MEDICAL EXAMINATION WITH ABNORMAL FINDINGS: ICD-10-CM

## 2022-01-17 DIAGNOSIS — F51.01 PRIMARY INSOMNIA: ICD-10-CM

## 2022-01-17 DIAGNOSIS — Z12.5 ENCOUNTER FOR PROSTATE CANCER SCREENING: ICD-10-CM

## 2022-01-17 DIAGNOSIS — U07.1 PNEUMONIA DUE TO COVID-19 VIRUS: Primary | ICD-10-CM

## 2022-01-17 DIAGNOSIS — J12.82 PNEUMONIA DUE TO COVID-19 VIRUS: Primary | ICD-10-CM

## 2022-01-17 DIAGNOSIS — E29.1 HYPOGONADISM IN MALE: ICD-10-CM

## 2022-01-17 DIAGNOSIS — J96.01 ACUTE RESPIRATORY FAILURE WITH HYPOXIA: ICD-10-CM

## 2022-01-17 DIAGNOSIS — Z86.010 HISTORY OF ADENOMATOUS POLYP OF COLON: ICD-10-CM

## 2022-01-17 PROBLEM — R29.818 SUSPECTED SLEEP APNEA: Status: ACTIVE | Noted: 2022-01-17

## 2022-01-17 PROCEDURE — 99495 TRANSJ CARE MGMT MOD F2F 14D: CPT | Performed by: INTERNAL MEDICINE

## 2022-01-17 PROCEDURE — 1111F DSCHRG MED/CURRENT MED MERGE: CPT | Performed by: INTERNAL MEDICINE

## 2022-01-17 RX ORDER — QUETIAPINE FUMARATE 50 MG/1
50 TABLET, EXTENDED RELEASE ORAL DAILY
Qty: 90 EACH | Refills: 1 | Status: SHIPPED | OUTPATIENT
Start: 2022-01-17 | End: 2022-01-17 | Stop reason: SDUPTHER

## 2022-01-17 RX ORDER — QUETIAPINE FUMARATE 50 MG/1
50 TABLET, EXTENDED RELEASE ORAL NIGHTLY
Qty: 90 EACH | Refills: 1 | Status: SHIPPED | OUTPATIENT
Start: 2022-01-17 | End: 2022-05-18

## 2022-01-17 RX ORDER — AZELASTINE 1 MG/ML
2 SPRAY, METERED NASAL 2 TIMES DAILY
Qty: 30 ML | Refills: 3 | Status: SHIPPED | OUTPATIENT
Start: 2022-01-17

## 2022-01-17 NOTE — PROGRESS NOTES
Transitional Care Follow Up Visit  Subjective     Shant Arevalo is a 61 y.o. male who presents for a transitional care management visit.    Within 48 business hours after discharge our office contacted him via telephone to coordinate his care and needs.      I reviewed and discussed the details of that call along with the discharge summary, hospital problems, inpatient lab results, inpatient diagnostic studies, and consultation reports with Shant.     Current outpatient and discharge medications have been reconciled for the patient.  Reviewed by: Fortino Ching DO      Date of TCM Phone Call 1/10/2022   [facility]   Date of Admission 1/3/2022   Date of Discharge 1/10/2022   Discharge Disposition Home or Self Care     Risk for Readmission (LACE) Score: 8 (1/10/2022  5:01 AM)      He notes he has been feeling well and has been recovering reasonably well without worsening, though he does have ongoing issues with dyspnea on exertion, but feels this is slowly improving. He is hopeful to establish primary care here after previous care with Dr. Wong as his wife has been seeing us and he wishes to consolidate things here. He notes he has been using oxygen only at night, but he has often thought he could have sleep apnea.      Course During Hospital Stay:  He was hospitalized with COVID pneumonia and had acute hypoxemic respiratory failure that required supplementation with Vapotherm, but he was able to be weaned to nasal cannula and has now only been using it at night.  He received antibiotics prior, and was outside the window for remdesevir.      The following portions of the patient's history were reviewed and updated as appropriate: allergies, current medications, past family history, past medical history, past social history, past surgical history and problem list.    Review of Systems   Constitutional: Positive for fatigue. Negative for chills and fever.   Respiratory: Positive for shortness of breath.  Negative for cough and wheezing.    Cardiovascular: Negative for chest pain and palpitations.       Objective   Physical Exam  Constitutional:       General: He is not in acute distress.  Cardiovascular:      Rate and Rhythm: Normal rate and regular rhythm.      Heart sounds: Normal heart sounds. No murmur heard.      Pulmonary:      Effort: Pulmonary effort is normal.      Breath sounds: Normal breath sounds. No wheezing.   Neurological:      Mental Status: He is alert and oriented to person, place, and time.      Gait: Gait normal.   Psychiatric:         Mood and Affect: Mood normal.         Behavior: Behavior normal.         Assessment/Plan   Diagnoses and all orders for this visit:    1. Pneumonia due to COVID-19 virus (Primary)  2. Acute respiratory failure with hypoxia (HCC)  Overnight oximetry to determine whether overnight O2 is still needed. Consider sleep study if suggestive based on oximetry to determine possible SANKET.     3. Hypogonadism in male  -     Comprehensive Metabolic Panel; Future  -     CBC & Differential; Future  -     Testosterone; Future  -     PSA Screen; Future  Labs prior to refilling testosterone. Recommend in the middle days between doses.     4. ETD (Eustachian tube dysfunction), bilateral  -     azelastine (Astelin) 0.1 % nasal spray; 2 sprays into the nostril(s) as directed by provider 2 (Two) Times a Day. Use in each nostril as directed  Dispense: 30 mL; Refill: 3  Continue flonase and will restart azelastine to see if things improve.     5. Primary insomnia  -     QUEtiapine fumarate ER (SEROquel XR) 50 MG tablet sustained-release 24 hour tablet; Take 1 tablet by mouth Every Night.  Dispense: 90 each; Refill: 1  Refill seroquel, but would recommend sleep study prior to change to alternative.     6. History of adenomatous polyp of colon  -     Ambulatory Referral to Gastroenterology  Referral to GI for surveillance colonoscopy.     7. Annual visit for general adult medical  examination with abnormal findings  -     Comprehensive Metabolic Panel; Future  -     Hemoglobin A1c; Future  -     Lipid Panel; Future  -     CBC & Differential; Future    8. Encounter for prostate cancer screening  -     PSA Screen; Future    Return in about 4 months (around 5/17/2022) for Annual physical with me.       Fortino Ching,   Internal Medicine  1/17/2022 Electronically signed.

## 2022-01-19 ENCOUNTER — READMISSION MANAGEMENT (OUTPATIENT)
Dept: CALL CENTER | Facility: HOSPITAL | Age: 62
End: 2022-01-19

## 2022-01-19 NOTE — OUTREACH NOTE
COVID-19 Week 2 Survey      Responses   [facility] patient discharged from? [place]   Does the patient have one of the following disease processes/diagnoses(primary or secondary)? COVID-19   COVID-19 underlying condition? None   Call Number Call 1   COVID-19 Week 2: Call 1 attempt successful? Yes   Call start time 1512   Call end time 1518   Discharge diagnosis Covid Pneumonia   Meds reviewed with patient/caregiver? Yes   Is the patient having any side effects they believe may be caused by any medication additions or changes? No   Does the patient have all medications ordered at discharge? Yes   Prescription comments Decadron d/c'd since PCP appt on 1/17/22   Is the patient taking all medications as directed (includes completed medication regime)? Yes   Does the patient have a primary care provider?  Yes   Does the patient have an appointment with their PCP or specialist within 7 days of discharge? Yes   Has the patient kept scheduled appointments due by today? Yes   Has home health visited the patient within 72 hours of discharge? N/A   Psychosocial issues? No   Did the patient receive a copy of their discharge instructions? Yes   Did the patient receive a copy of COVID-19 specific instructions? Yes   Nursing interventions Reviewed instructions with patient   What is the patient's perception of their health status since discharge? Improving   Does the patient have any of the following symptoms? Shortness of breath  [SOB on exertion]   Nursing Interventions Nurse provided patient education   Pulse Ox monitoring Intermittent   Pulse Ox device source Patient   O2 Sat comments 93-96% on RA, wears O2 at night or extended exertion   O2 Sat: education provided Sat levels,  Monitoring frequency,  When to seek care   O2 Sat education comments will go to ED for O2 sats <90% and unresolving SOB   Is the patient/caregiver able to teach back steps to recovery at home? Set small, achievable goals for return to baseline  health,  Rest and rebuild strength, gradually increase activity,  Eat a well-balance diet   Is the patient/caregiver able to teach back the hierarchy of who to call/visit for symptoms/problems? PCP, Specialist, Home health nurse, Urgent Care, ED, 911 Yes   COVID-19 call completed? Yes          Lenora Stallings RN

## 2022-01-26 ENCOUNTER — READMISSION MANAGEMENT (OUTPATIENT)
Dept: CALL CENTER | Facility: HOSPITAL | Age: 62
End: 2022-01-26

## 2022-01-26 NOTE — OUTREACH NOTE
COVID-19 Week 3 Survey      Responses   [facility] patient discharged from? [place]   Does the patient have one of the following disease processes/diagnoses(primary or secondary)? COVID-19   COVID-19 underlying condition? None   Call Number Call 1   COVID-19 Week 3: Call 1 attempt successful? Yes   Call start time 1138   Call end time 1141   Discharge diagnosis Covid Pneumonia   Person spoke with today (if not patient) and relationship Mary Jane-spouse   Meds reviewed with patient/caregiver? Yes   Is the patient having any side effects they believe may be caused by any medication additions or changes? No   Does the patient have all medications ordered at discharge? Yes   Is the patient taking all medications as directed (includes completed medication regime)? Yes   Does the patient have a primary care provider?  Yes   Comments regarding PCP new patient appt with Dr. Ching on 1/17   Does the patient have an appointment with their PCP or specialist within 7 days of discharge? Yes   Has the patient kept scheduled appointments due by today? Yes   Has home health visited the patient within 72 hours of discharge? N/A   What DME was ordered? O2 2-4L [facility]   Has all DME been delivered? Yes   Psychosocial issues? No   Did the patient receive a copy of their discharge instructions? Yes   Did the patient receive a copy of COVID-19 specific instructions? Yes   Nursing interventions Reviewed instructions with patient   What is the patient's perception of their health status since discharge? Improving   Does the patient have any of the following symptoms? Shortness of breath  [With exertion]   Nursing Interventions Nurse provided patient education   Pulse Ox monitoring Intermittent   Pulse Ox device source Patient   O2 Sat comments 96% on RA.   O2 Sat: education provided Sat levels,  Monitoring frequency,  When to seek care   O2 Sat education comments Pt. to go to ED for O2 sats <90%    Is the patient/caregiver able to teach  back steps to recovery at home? Eat a well-balance diet,  Rest and rebuild strength, gradually increase activity   If the patient is a current smoker, are they able to teach back resources for cessation? Not a smoker   Is the patient/caregiver able to teach back the hierarchy of who to call/visit for symptoms/problems? PCP, Specialist, Home health nurse, Urgent Care, ED, 911 Yes   COVID-19 call completed? Yes          Mishel Garcia RN

## 2022-02-10 NOTE — PROGRESS NOTES
Subjective    Mr. Arevalo is 61 y.o. male    Chief Complaint: Frequency urgency.  History of Present Illness  Patient with history of BPH who had taken 2 tamsulosin daily due to continued lower urinary tract symptoms which did not help.  He had a cystoscopy done by Dr. Begum 10/14/2021 did not show any obstructive changes the prostate urethra was not obstructed.  Dr. Begum had written prescription for trospium but patient states for no reason he never started the medicine although both himself and his wife developed fairly severe Covid and it could have been around this time.  He has continued urgency and frequency.    The following portions of the patient's history were reviewed and updated as appropriate: allergies, current medications, past family history, past medical history, past social history, past surgical history and problem list.    Review of Systems   Constitutional: Negative for chills and fever.   Gastrointestinal: Negative for abdominal pain, anal bleeding and blood in stool.   Genitourinary: Positive for frequency and urgency. Negative for decreased urine volume, difficulty urinating, dysuria, enuresis, flank pain, genital sores, hematuria, penile discharge, penile pain, penile swelling, scrotal swelling and testicular pain.         Current Outpatient Medications:   •  azelastine (Astelin) 0.1 % nasal spray, 2 sprays into the nostril(s) as directed by provider 2 (Two) Times a Day. Use in each nostril as directed, Disp: 30 mL, Rfl: 3  •  cetirizine (zyrTEC) 10 MG tablet, Take 10 mg by mouth Daily., Disp: , Rfl:   •  fluticasone (FLONASE) 50 MCG/ACT nasal spray, 1 spray into each nostril 2 (Two) Times a Day., Disp: 16 g, Rfl: 3  •  Iron-Vitamins (GERITOL PO), Take 1 tablet by mouth Daily., Disp: , Rfl:   •  QUEtiapine fumarate ER (SEROquel XR) 50 MG tablet sustained-release 24 hour tablet, Take 1 tablet by mouth Every Night., Disp: 90 each, Rfl: 1  •  Testosterone Cypionate 200 MG/ML kit, Inject 0.75  "mL into the shoulder, thigh, or buttocks 1 (One) Time Per Week., Disp: 9 kit, Rfl: 0  •  busPIRone (BUSPAR) 10 MG tablet, Take 10 mg by mouth 2 (Two) Times a Day., Disp: , Rfl:   •  trospium (SANCTURA) 20 MG tablet, Take 1 tablet by mouth 2 (Two) Times a Day Before Meals., Disp: 60 tablet, Rfl: 11    Past Medical History:   Diagnosis Date   • Allergic    • Anxiety 8/24/2017   • Environmental and seasonal allergies 9/28/2017   • ETD (eustachian tube dysfunction) 9/28/2017   • Hyperlipidemia    • Hypogonadism in male    • Pneumonia due to COVID-19 virus 1/3/2022       Past Surgical History:   Procedure Laterality Date   • SHOULDER ACROMIOPLASTY WITH ROTATOR CUFF REPAIR     • SINUS SURGERY      x5       Social History     Socioeconomic History   • Marital status:    Tobacco Use   • Smoking status: Never Smoker   • Smokeless tobacco: Never Used   Vaping Use   • Vaping Use: Never used   Substance and Sexual Activity   • Alcohol use: No   • Drug use: No   • Sexual activity: Yes     Partners: Female       Family History   Problem Relation Age of Onset   • Heart disease Mother    • Cancer Mother         breast   • Breast cancer Mother    • Diabetes Mother    • Brain cancer Father    • No Known Problems Brother    • Colon cancer Neg Hx    • Prostate cancer Neg Hx    • Alcohol abuse Neg Hx    • Drug abuse Neg Hx    • Thyroid cancer Neg Hx    • Thyroid disease Neg Hx        Objective    Temp 97.1 °F (36.2 °C)   Ht 182.9 cm (72.01\")   Wt 88.9 kg (196 lb)   BMI 26.58 kg/m²     Physical Exam  Vitals reviewed.   Constitutional:       Appearance: Normal appearance.   HENT:      Head: Normocephalic and atraumatic.      Nose: No congestion.   Pulmonary:      Effort: Pulmonary effort is normal.   Skin:     General: Skin is dry.   Neurological:      Mental Status: He is alert and oriented to person, place, and time.   Psychiatric:         Mood and Affect: Mood normal.         Behavior: Behavior normal.             Results for " orders placed or performed in visit on 02/15/22   POC Urinalysis Dipstick, Multipro    Specimen: Urine   Result Value Ref Range    Color Areli Yellow, Straw, Dark Yellow, Areli    Clarity, UA Clear Clear    Glucose, UA Negative Negative, 1000 mg/dL (3+) mg/dL    Bilirubin Negative Negative    Ketones, UA Negative Negative    Specific Gravity  1.020 1.005 - 1.030    Blood, UA Negative Negative    pH, Urine 7.0 5.0 - 8.0    Protein, POC Negative Negative mg/dL    Urobilinogen, UA Normal Normal    Nitrite, UA Negative Negative    Leukocytes Negative Negative          Assessment and Plan    Diagnoses and all orders for this visit:    1. BPH with obstruction/lower urinary tract symptoms (Primary)  -     POC Urinalysis Dipstick, Multipro  -     trospium (SANCTURA) 20 MG tablet; Take 1 tablet by mouth 2 (Two) Times a Day Before Meals.  Dispense: 60 tablet; Refill: 11    Patient never started the trospium that was prescribed by Dr. Begum I went ahead and prescribed this again for him to take twice a day for his urgency and frequency follow-up in 3 months.

## 2022-02-15 ENCOUNTER — OFFICE VISIT (OUTPATIENT)
Dept: UROLOGY | Facility: CLINIC | Age: 62
End: 2022-02-15

## 2022-02-15 VITALS — BODY MASS INDEX: 26.55 KG/M2 | HEIGHT: 72 IN | WEIGHT: 196 LBS | TEMPERATURE: 97.1 F

## 2022-02-15 DIAGNOSIS — N13.8 BPH WITH OBSTRUCTION/LOWER URINARY TRACT SYMPTOMS: Primary | ICD-10-CM

## 2022-02-15 DIAGNOSIS — N40.1 BPH WITH OBSTRUCTION/LOWER URINARY TRACT SYMPTOMS: Primary | ICD-10-CM

## 2022-02-15 LAB
BILIRUB BLD-MCNC: NEGATIVE MG/DL
CLARITY, POC: CLEAR
COLOR UR: NORMAL
GLUCOSE UR STRIP-MCNC: NEGATIVE MG/DL
KETONES UR QL: NEGATIVE
LEUKOCYTE EST, POC: NEGATIVE
NITRITE UR-MCNC: NEGATIVE MG/ML
PH UR: 7 [PH] (ref 5–8)
PROT UR STRIP-MCNC: NEGATIVE MG/DL
RBC # UR STRIP: NEGATIVE /UL
SP GR UR: 1.02 (ref 1–1.03)
UROBILINOGEN UR QL: NORMAL

## 2022-02-15 PROCEDURE — 99214 OFFICE O/P EST MOD 30 MIN: CPT | Performed by: PHYSICIAN ASSISTANT

## 2022-02-15 PROCEDURE — 81001 URINALYSIS AUTO W/SCOPE: CPT | Performed by: PHYSICIAN ASSISTANT

## 2022-02-15 RX ORDER — BUSPIRONE HYDROCHLORIDE 10 MG/1
10 TABLET ORAL NIGHTLY
COMMUNITY
Start: 2022-01-26 | End: 2022-11-03 | Stop reason: ALTCHOICE

## 2022-02-15 RX ORDER — TROSPIUM CHLORIDE 20 MG/1
20 TABLET, FILM COATED ORAL
Qty: 60 TABLET | Refills: 11 | Status: SHIPPED | OUTPATIENT
Start: 2022-02-15 | End: 2022-11-03

## 2022-03-02 NOTE — PROGRESS NOTES
[facility]    Primary Physician Fortino Ching,     3/3/2022    Shant Arevalo   1960      Chief Complaint   Patient presents with   • Colonoscopy       Subjective     HPI    Shant Arevalo is a 61 y.o. male who presents as a referral for preventative maintenance. He has no complaints of nausea or vomiting. No change in bowels. No wt loss. No BRBPR. No melena. No abdominal pain.        Last colonoscopy was 9/2016 by Dr. Derian lennon.  The patient does have history of tubular adenomatous colon polyps 2013. The patient does not  have history of colon cancer. There is not a family history of colon polyps/colon cancer.     Past Medical History:   Diagnosis Date   • Allergic    • Anxiety 8/24/2017   • Environmental and seasonal allergies 9/28/2017   • ETD (eustachian tube dysfunction) 9/28/2017   • Hyperlipidemia    • Hypogonadism in male    • Pneumonia due to COVID-19 virus 1/3/2022       Past Surgical History:   Procedure Laterality Date   • COLONOSCOPY  2020?    polyps   • SHOULDER ACROMIOPLASTY WITH ROTATOR CUFF REPAIR     • SINUS SURGERY      x5       Outpatient Medications Marked as Taking for the 3/3/22 encounter (Office Visit) with Steffi Hill APRN   Medication Sig Dispense Refill   • azelastine (Astelin) 0.1 % nasal spray 2 sprays into the nostril(s) as directed by provider 2 (Two) Times a Day. Use in each nostril as directed 30 mL 3   • busPIRone (BUSPAR) 10 MG tablet Take  by mouth Every Night.     • cetirizine (zyrTEC) 10 MG tablet Take 10 mg by mouth Daily.     • fluticasone (FLONASE) 50 MCG/ACT nasal spray 1 spray into each nostril 2 (Two) Times a Day. 16 g 3   • Iron-Vitamins (GERITOL PO) Take 1 tablet by mouth Daily.     • pseudoephedrine (SUDAFED) 30 MG tablet Take 30 mg by mouth Daily.     • QUEtiapine fumarate ER (SEROquel XR) 50 MG tablet sustained-release 24 hour tablet Take 1 tablet by mouth Every Night. 90 each 1   • Testosterone Cypionate 200 MG/ML kit  Inject 0.75 mL into the shoulder, thigh, or buttocks 1 (One) Time Per Week. 9 kit 0   • trospium (SANCTURA) 20 MG tablet Take 1 tablet by mouth 2 (Two) Times a Day Before Meals. 60 tablet 11       No Known Allergies    Social History     Socioeconomic History   • Marital status:    Tobacco Use   • Smoking status: Never Smoker   • Smokeless tobacco: Never Used   Vaping Use   • Vaping Use: Never used   Substance and Sexual Activity   • Alcohol use: No   • Drug use: No   • Sexual activity: Yes     Partners: Female       Family History   Problem Relation Age of Onset   • Heart disease Mother    • Cancer Mother         breast   • Breast cancer Mother    • Diabetes Mother    • Brain cancer Father    • No Known Problems Brother    • Colon cancer Neg Hx    • Prostate cancer Neg Hx    • Alcohol abuse Neg Hx    • Drug abuse Neg Hx    • Thyroid cancer Neg Hx    • Thyroid disease Neg Hx    • Colon polyps Neg Hx        Review of Systems   Constitutional: Negative for chills, fever and unexpected weight change.   Respiratory: Negative for cough, shortness of breath and wheezing.    Cardiovascular: Negative for chest pain and palpitations.   Gastrointestinal: Negative for abdominal distention, abdominal pain, anal bleeding, blood in stool, constipation, diarrhea, nausea and vomiting.       Objective     Vitals:    03/03/22 0807   BP: 136/82   Pulse: 104   Temp: 96.9 °F (36.1 °C)   SpO2: 98%         03/03/22  0807   Weight: 88.9 kg (196 lb)     Body mass index is 26.58 kg/m².    Physical Exam  Vitals reviewed.   Constitutional:       General: He is not in acute distress.  Cardiovascular:      Rate and Rhythm: Normal rate and regular rhythm.      Heart sounds: Normal heart sounds.   Pulmonary:      Effort: Pulmonary effort is normal.      Breath sounds: Normal breath sounds.   Abdominal:      General: Bowel sounds are normal. There is no distension.      Palpations: Abdomen is soft.      Tenderness: There is no abdominal  tenderness.   Skin:     General: Skin is warm and dry.   Neurological:      Mental Status: He is alert.         Imaging Results (Most Recent)     None          Assessment/Plan     Diagnoses and all orders for this visit:    1. History of adenomatous polyp of colon (Primary)  -     Case Request; Standing  -     Case Request    Other orders  -     Follow Anesthesia Guidelines / Protocol; Future  -     Obtain Informed Consent; Future  -     Sod Picosulfate-Mag Ox-Cit Acd (Clenpiq) 10-3.5-12 MG-GM -GM/160ML solution; Take 160 mL by mouth 1 (One) Time for 1 dose. Take as directed  Dispense: 320 mL; Refill: 0      Plan for colonoscopy.        Addendum: he has had some episodes of post prandial bm 's ( after milk and even almond milk). I recommend starting a fiber supplement and trial of fod map diet.         COLONOSCOPY WITH ANESTHESIA (N/A)  All risks, benefits, alternatives, and indications of colonoscopy procedure have been discussed with the patient. Risks to include perforation of the colon requiring possible surgery or colostomy, risk of bleeding from biopsies or removal of colon tissue, possibility of missing a colon polyp or cancer, or adverse drug reaction.  Benefits to include the diagnosis and management of disease of the colon and rectum. Alternatives to include barium enema, radiographic evaluation, lab testing or no intervention. Pt verbalizes understanding and agrees.       CHRISTOPHER Deluca

## 2022-03-02 NOTE — H&P (VIEW-ONLY)
[facility]    Primary Physician Fortino Ching,     3/3/2022    Shant Arevalo   1960      Chief Complaint   Patient presents with   • Colonoscopy       Subjective     HPI    Shant Arevalo is a 61 y.o. male who presents as a referral for preventative maintenance. He has no complaints of nausea or vomiting. No change in bowels. No wt loss. No BRBPR. No melena. No abdominal pain.        Last colonoscopy was 9/2016 by Dr. Derian lennon.  The patient does have history of tubular adenomatous colon polyps 2013. The patient does not  have history of colon cancer. There is not a family history of colon polyps/colon cancer.     Past Medical History:   Diagnosis Date   • Allergic    • Anxiety 8/24/2017   • Environmental and seasonal allergies 9/28/2017   • ETD (eustachian tube dysfunction) 9/28/2017   • Hyperlipidemia    • Hypogonadism in male    • Pneumonia due to COVID-19 virus 1/3/2022       Past Surgical History:   Procedure Laterality Date   • COLONOSCOPY  2020?    polyps   • SHOULDER ACROMIOPLASTY WITH ROTATOR CUFF REPAIR     • SINUS SURGERY      x5       Outpatient Medications Marked as Taking for the 3/3/22 encounter (Office Visit) with Steffi Hill APRN   Medication Sig Dispense Refill   • azelastine (Astelin) 0.1 % nasal spray 2 sprays into the nostril(s) as directed by provider 2 (Two) Times a Day. Use in each nostril as directed 30 mL 3   • busPIRone (BUSPAR) 10 MG tablet Take  by mouth Every Night.     • cetirizine (zyrTEC) 10 MG tablet Take 10 mg by mouth Daily.     • fluticasone (FLONASE) 50 MCG/ACT nasal spray 1 spray into each nostril 2 (Two) Times a Day. 16 g 3   • Iron-Vitamins (GERITOL PO) Take 1 tablet by mouth Daily.     • pseudoephedrine (SUDAFED) 30 MG tablet Take 30 mg by mouth Daily.     • QUEtiapine fumarate ER (SEROquel XR) 50 MG tablet sustained-release 24 hour tablet Take 1 tablet by mouth Every Night. 90 each 1   • Testosterone Cypionate 200 MG/ML kit  Inject 0.75 mL into the shoulder, thigh, or buttocks 1 (One) Time Per Week. 9 kit 0   • trospium (SANCTURA) 20 MG tablet Take 1 tablet by mouth 2 (Two) Times a Day Before Meals. 60 tablet 11       No Known Allergies    Social History     Socioeconomic History   • Marital status:    Tobacco Use   • Smoking status: Never Smoker   • Smokeless tobacco: Never Used   Vaping Use   • Vaping Use: Never used   Substance and Sexual Activity   • Alcohol use: No   • Drug use: No   • Sexual activity: Yes     Partners: Female       Family History   Problem Relation Age of Onset   • Heart disease Mother    • Cancer Mother         breast   • Breast cancer Mother    • Diabetes Mother    • Brain cancer Father    • No Known Problems Brother    • Colon cancer Neg Hx    • Prostate cancer Neg Hx    • Alcohol abuse Neg Hx    • Drug abuse Neg Hx    • Thyroid cancer Neg Hx    • Thyroid disease Neg Hx    • Colon polyps Neg Hx        Review of Systems   Constitutional: Negative for chills, fever and unexpected weight change.   Respiratory: Negative for cough, shortness of breath and wheezing.    Cardiovascular: Negative for chest pain and palpitations.   Gastrointestinal: Negative for abdominal distention, abdominal pain, anal bleeding, blood in stool, constipation, diarrhea, nausea and vomiting.       Objective     Vitals:    03/03/22 0807   BP: 136/82   Pulse: 104   Temp: 96.9 °F (36.1 °C)   SpO2: 98%         03/03/22  0807   Weight: 88.9 kg (196 lb)     Body mass index is 26.58 kg/m².    Physical Exam  Vitals reviewed.   Constitutional:       General: He is not in acute distress.  Cardiovascular:      Rate and Rhythm: Normal rate and regular rhythm.      Heart sounds: Normal heart sounds.   Pulmonary:      Effort: Pulmonary effort is normal.      Breath sounds: Normal breath sounds.   Abdominal:      General: Bowel sounds are normal. There is no distension.      Palpations: Abdomen is soft.      Tenderness: There is no abdominal  tenderness.   Skin:     General: Skin is warm and dry.   Neurological:      Mental Status: He is alert.         Imaging Results (Most Recent)     None          Assessment/Plan     Diagnoses and all orders for this visit:    1. History of adenomatous polyp of colon (Primary)  -     Case Request; Standing  -     Case Request    Other orders  -     Follow Anesthesia Guidelines / Protocol; Future  -     Obtain Informed Consent; Future  -     Sod Picosulfate-Mag Ox-Cit Acd (Clenpiq) 10-3.5-12 MG-GM -GM/160ML solution; Take 160 mL by mouth 1 (One) Time for 1 dose. Take as directed  Dispense: 320 mL; Refill: 0      Plan for colonoscopy.        Addendum: he has had some episodes of post prandial bm 's ( after milk and even almond milk). I recommend starting a fiber supplement and trial of fod map diet.         COLONOSCOPY WITH ANESTHESIA (N/A)  All risks, benefits, alternatives, and indications of colonoscopy procedure have been discussed with the patient. Risks to include perforation of the colon requiring possible surgery or colostomy, risk of bleeding from biopsies or removal of colon tissue, possibility of missing a colon polyp or cancer, or adverse drug reaction.  Benefits to include the diagnosis and management of disease of the colon and rectum. Alternatives to include barium enema, radiographic evaluation, lab testing or no intervention. Pt verbalizes understanding and agrees.       CHRISTOPHER Deluca

## 2022-03-03 ENCOUNTER — OFFICE VISIT (OUTPATIENT)
Dept: GASTROENTEROLOGY | Facility: CLINIC | Age: 62
End: 2022-03-03

## 2022-03-03 VITALS
BODY MASS INDEX: 26.55 KG/M2 | HEART RATE: 104 BPM | DIASTOLIC BLOOD PRESSURE: 82 MMHG | WEIGHT: 196 LBS | SYSTOLIC BLOOD PRESSURE: 136 MMHG | TEMPERATURE: 96.9 F | OXYGEN SATURATION: 98 % | HEIGHT: 72 IN

## 2022-03-03 DIAGNOSIS — Z86.010 HISTORY OF ADENOMATOUS POLYP OF COLON: Primary | ICD-10-CM

## 2022-03-03 PROCEDURE — S0285 CNSLT BEFORE SCREEN COLONOSC: HCPCS | Performed by: NURSE PRACTITIONER

## 2022-03-03 RX ORDER — SODIUM PICOSULFATE, MAGNESIUM OXIDE, AND ANHYDROUS CITRIC ACID 10; 3.5; 12 MG/160ML; G/160ML; G/160ML
160 LIQUID ORAL ONCE
Qty: 320 ML | Refills: 0 | Status: SHIPPED | OUTPATIENT
Start: 2022-03-03 | End: 2022-03-03

## 2022-03-03 RX ORDER — PSEUDOEPHEDRINE HCL 30 MG
30 TABLET ORAL DAILY
COMMUNITY

## 2022-03-04 PROBLEM — Z86.010 HISTORY OF ADENOMATOUS POLYP OF COLON: Status: ACTIVE | Noted: 2022-03-04

## 2022-03-22 ENCOUNTER — HOSPITAL ENCOUNTER (OUTPATIENT)
Facility: HOSPITAL | Age: 62
Setting detail: HOSPITAL OUTPATIENT SURGERY
Discharge: HOME | End: 2022-03-22
Attending: INTERNAL MEDICINE | Admitting: INTERNAL MEDICINE

## 2022-03-22 ENCOUNTER — ANESTHESIA (OUTPATIENT)
Dept: GASTROENTEROLOGY | Facility: HOSPITAL | Age: 62
End: 2022-03-22

## 2022-03-22 ENCOUNTER — ANESTHESIA EVENT (OUTPATIENT)
Dept: GASTROENTEROLOGY | Facility: HOSPITAL | Age: 62
End: 2022-03-22

## 2022-03-22 ENCOUNTER — TELEPHONE (OUTPATIENT)
Dept: GASTROENTEROLOGY | Facility: CLINIC | Age: 62
End: 2022-03-22

## 2022-03-22 VITALS
OXYGEN SATURATION: 98 % | DIASTOLIC BLOOD PRESSURE: 95 MMHG | BODY MASS INDEX: 26.14 KG/M2 | WEIGHT: 193 LBS | RESPIRATION RATE: 16 BRPM | HEART RATE: 90 BPM | HEIGHT: 72 IN | SYSTOLIC BLOOD PRESSURE: 151 MMHG | TEMPERATURE: 97 F

## 2022-03-22 PROCEDURE — 25010000002 PROPOFOL 10 MG/ML EMULSION

## 2022-03-22 PROCEDURE — 45378 DIAGNOSTIC COLONOSCOPY: CPT | Performed by: INTERNAL MEDICINE

## 2022-03-22 RX ORDER — ONDANSETRON 2 MG/ML
4 INJECTION INTRAMUSCULAR; INTRAVENOUS ONCE AS NEEDED
Status: DISCONTINUED | OUTPATIENT
Start: 2022-03-22 | End: 2022-03-22 | Stop reason: HOSPADM

## 2022-03-22 RX ORDER — LIDOCAINE HYDROCHLORIDE 20 MG/ML
INJECTION, SOLUTION EPIDURAL; INFILTRATION; INTRACAUDAL; PERINEURAL AS NEEDED
Status: DISCONTINUED | OUTPATIENT
Start: 2022-03-22 | End: 2022-03-22 | Stop reason: SURG

## 2022-03-22 RX ORDER — PROPOFOL 10 MG/ML
VIAL (ML) INTRAVENOUS AS NEEDED
Status: DISCONTINUED | OUTPATIENT
Start: 2022-03-22 | End: 2022-03-22 | Stop reason: SURG

## 2022-03-22 RX ORDER — SODIUM CHLORIDE 0.9 % (FLUSH) 0.9 %
10 SYRINGE (ML) INJECTION AS NEEDED
Status: DISCONTINUED | OUTPATIENT
Start: 2022-03-22 | End: 2022-03-22 | Stop reason: HOSPADM

## 2022-03-22 RX ORDER — LIDOCAINE HYDROCHLORIDE 10 MG/ML
0.5 INJECTION, SOLUTION EPIDURAL; INFILTRATION; INTRACAUDAL; PERINEURAL ONCE AS NEEDED
Status: DISCONTINUED | OUTPATIENT
Start: 2022-03-22 | End: 2022-03-22 | Stop reason: HOSPADM

## 2022-03-22 RX ORDER — SODIUM CHLORIDE 9 MG/ML
500 INJECTION, SOLUTION INTRAVENOUS CONTINUOUS PRN
Status: DISCONTINUED | OUTPATIENT
Start: 2022-03-22 | End: 2022-03-22 | Stop reason: HOSPADM

## 2022-03-22 RX ADMIN — LIDOCAINE HYDROCHLORIDE 100 MG: 20 INJECTION, SOLUTION EPIDURAL; INFILTRATION; INTRACAUDAL; PERINEURAL at 10:22

## 2022-03-22 RX ADMIN — SODIUM CHLORIDE 500 ML: 9 INJECTION, SOLUTION INTRAVENOUS at 09:36

## 2022-03-22 RX ADMIN — PROPOFOL 200 MG: 10 INJECTION, EMULSION INTRAVENOUS at 10:22

## 2022-03-22 NOTE — ANESTHESIA PREPROCEDURE EVALUATION
Anesthesia Evaluation     Patient summary reviewed and Nursing notes reviewed   history of anesthetic complications: PONV  NPO Solid Status: > 8 hours  NPO Liquid Status: > 2 hours           Airway   Mallampati: II  TM distance: >3 FB  Neck ROM: full  No difficulty expected  Dental      Pulmonary    (+) sleep apnea,     ROS comment: covid 12/2021, hospitalized for 1 week   Cardiovascular   Exercise tolerance: good (4-7 METS)    (+) hyperlipidemia,       Neuro/Psych  (-) seizures, TIA, CVA  GI/Hepatic/Renal/Endo    (-) liver disease, no renal disease, diabetes    Musculoskeletal     Abdominal    Substance History      OB/GYN          Other                        Anesthesia Plan    ASA 2     MAC     intravenous induction     Anesthetic plan, all risks, benefits, and alternatives have been provided, discussed and informed consent has been obtained with: patient.        CODE STATUS:

## 2022-03-22 NOTE — ANESTHESIA POSTPROCEDURE EVALUATION
"Patient: Shant Arevalo    Procedure Summary     Date: 03/22/22 Room / Location:  [facility] /  [facility]    Anesthesia Start: 1007 Anesthesia Stop: 1039    Procedure: COLONOSCOPY WITH ANESTHESIA (N/A ) Diagnosis:       History of adenomatous polyp of colon      (History of adenomatous polyp of colon [Z86.010])    Surgeons: Shiraz Esqueda MD Provider: Siddharth Benavides CRNA    Anesthesia Type: MAC ASA Status: 2          Anesthesia Type: MAC    Vitals  Vitals Value Taken Time   /95 03/22/22 1056   Temp     Pulse 89 03/22/22 1056   Resp 16 03/22/22 1055   SpO2 97 % 03/22/22 1056   Vitals shown include unvalidated device data.        Post Anesthesia Care and Evaluation    Patient location during evaluation: PHASE II  Patient participation: complete - patient participated  Level of consciousness: awake and alert  Pain score: 0  Pain management: adequate  Airway patency: patent  Anesthetic complications: No anesthetic complications  PONV Status: none  Cardiovascular status: acceptable  Respiratory status: acceptable  Hydration status: acceptable    Comments: /95   Pulse 90   Temp 97 °F (36.1 °C) (Temporal)   Resp 16   Ht 182.9 cm (72\")   Wt 87.5 kg (193 lb)   SpO2 98%   BMI 26.18 kg/m²     No anesthesia care post op    "

## 2022-05-18 ENCOUNTER — OFFICE VISIT (OUTPATIENT)
Dept: INTERNAL MEDICINE | Facility: CLINIC | Age: 62
End: 2022-05-18

## 2022-05-18 VITALS
BODY MASS INDEX: 26.89 KG/M2 | HEIGHT: 72 IN | DIASTOLIC BLOOD PRESSURE: 70 MMHG | TEMPERATURE: 98 F | RESPIRATION RATE: 16 BRPM | OXYGEN SATURATION: 98 % | HEART RATE: 85 BPM | SYSTOLIC BLOOD PRESSURE: 138 MMHG | WEIGHT: 198.5 LBS

## 2022-05-18 DIAGNOSIS — R29.818 SUSPECTED SLEEP APNEA: ICD-10-CM

## 2022-05-18 DIAGNOSIS — G47.10 EXCESSIVE SOMNOLENCE DISORDER: ICD-10-CM

## 2022-05-18 DIAGNOSIS — R06.81 WITNESSED EPISODE OF APNEA: ICD-10-CM

## 2022-05-18 DIAGNOSIS — R06.83 SNORING: ICD-10-CM

## 2022-05-18 DIAGNOSIS — Z00.01 ANNUAL VISIT FOR GENERAL ADULT MEDICAL EXAMINATION WITH ABNORMAL FINDINGS: Primary | ICD-10-CM

## 2022-05-18 PROBLEM — D89.831 CYTOKINE RELEASE SYNDROME, GRADE 1: Status: RESOLVED | Noted: 2022-01-10 | Resolved: 2022-05-18

## 2022-05-18 PROCEDURE — 99213 OFFICE O/P EST LOW 20 MIN: CPT | Performed by: INTERNAL MEDICINE

## 2022-05-18 PROCEDURE — 99396 PREV VISIT EST AGE 40-64: CPT | Performed by: INTERNAL MEDICINE

## 2022-05-18 RX ORDER — NEEDLES, DISPOSABLE 25GX5/8"
NEEDLE, DISPOSABLE MISCELLANEOUS
COMMUNITY
Start: 2022-04-19

## 2022-05-18 RX ORDER — SIMVASTATIN 20 MG
1 TABLET ORAL DAILY
COMMUNITY
Start: 2022-03-22

## 2022-05-18 RX ORDER — CLONAZEPAM 1 MG/1
1 TABLET ORAL
COMMUNITY
Start: 2022-03-22 | End: 2022-11-03 | Stop reason: ALTCHOICE

## 2022-05-18 RX ORDER — TEMAZEPAM 30 MG/1
1 CAPSULE ORAL
COMMUNITY
Start: 2022-04-21 | End: 2022-05-18

## 2022-05-18 RX ORDER — SYRINGE WITH NEEDLE, 1 ML 25GX5/8"
SYRINGE, EMPTY DISPOSABLE MISCELLANEOUS
COMMUNITY
Start: 2022-05-09

## 2022-05-18 RX ORDER — TRAZODONE HYDROCHLORIDE 100 MG/1
1 TABLET ORAL
COMMUNITY
Start: 2022-05-03 | End: 2022-05-18

## 2022-05-18 NOTE — PROGRESS NOTES
CC: f/u preventive health AND sleep disturbances    History:  Shant Arevalo is a 62 y.o. male who presents today for evaluation of the above problems.  He notes he has been doing reasonably well, but has ongoing difficulty with sleep.  He has a monitor that he wears while in bed at night that shows he rarely gets into deep sleep and does have frequent awakenings.  He does snore and has had witnessed apnea in the past.  He is averse to wearing a CPAP, but would like to consider options if he is in fact diagnosed with sleep apnea.     [place]:  Sitting and Reading: 3  Watching TV: 1  Sitting, inactive in a public place: 2  As a passenger in a car for an hour without a break: 2  Lying down to rest in the afternoon: 0  Sitting and talking to someone: 0  Sitting quietly after a lunch without alcohol: 2  In a car, while stopped for a few minutes in traffic: 0  Total: 10        ROS:  Review of Systems   Constitutional: Negative for chills and fever.   HENT: Negative for congestion and sore throat.    Eyes: Negative for visual disturbance.   Respiratory: Negative for cough and shortness of breath.    Cardiovascular: Negative for chest pain and palpitations.   Gastrointestinal: Negative for abdominal pain, constipation and nausea.   Endocrine: Negative for cold intolerance and heat intolerance.   Genitourinary: Negative for difficulty urinating and frequency.   Musculoskeletal: Negative for arthralgias and back pain.   Skin: Negative for rash.   Neurological: Negative for dizziness and headaches.   Psychiatric/Behavioral: Positive for sleep disturbance. Negative for dysphoric mood. The patient is not nervous/anxious.        No Known Allergies  Past Medical History:   Diagnosis Date   • Allergic    • Anxiety 8/24/2017   • Environmental and seasonal allergies 9/28/2017   • ETD (eustachian tube dysfunction) 9/28/2017   • Hyperlipidemia    • Hypogonadism in male    • Pneumonia due to COVID-19 virus 1/3/2022     Past Surgical  "History:   Procedure Laterality Date   • COLONOSCOPY  2020?    polyps   • COLONOSCOPY N/A 3/22/2022    Procedure: COLONOSCOPY WITH ANESTHESIA;  Surgeon: Shiraz Esqueda MD;  [facility] ENDOSCOPY;  Service: Gastroenterology;  Laterality: N/A;  preop; hx of polyps   postop; diverticulosis;   PCP none    • SHOULDER ACROMIOPLASTY WITH ROTATOR CUFF REPAIR     • SINUS SURGERY      x5     Family History   Problem Relation Age of Onset   • Heart disease Mother    • Cancer Mother         breast   • Breast cancer Mother    • Diabetes Mother    • Brain cancer Father    • No Known Problems Brother    • Colon cancer Neg Hx    • Prostate cancer Neg Hx    • Alcohol abuse Neg Hx    • Drug abuse Neg Hx    • Thyroid cancer Neg Hx    • Thyroid disease Neg Hx    • Colon polyps Neg Hx       reports that he has never smoked. He has never used smokeless tobacco. He reports that he does not drink alcohol and does not use drugs.      Current Outpatient Medications:   •  azelastine (Astelin) 0.1 % nasal spray, 2 sprays into the nostril(s) as directed by provider 2 (Two) Times a Day. Use in each nostril as directed, Disp: 30 mL, Rfl: 3  •  B-D 3CC LUER-EMIL SYR 23GX1\" 23G X 1\" 3 ML misc, USE AS DIRECTED ONCE A WEEK INTRAMUSCULAR, Disp: , Rfl:   •  BD Disp Needles 22G X 1-1/2\" misc, USE 1 ONCE A WEEK AS DIRECTED, Disp: , Rfl:   •  busPIRone (BUSPAR) 10 MG tablet, Take 10 mg by mouth Every Night., Disp: , Rfl:   •  cetirizine (zyrTEC) 10 MG tablet, Take 10 mg by mouth Daily., Disp: , Rfl:   •  clonazePAM (KlonoPIN) 1 MG tablet, Take 1 tablet by mouth every night at bedtime., Disp: , Rfl:   •  fluticasone (FLONASE) 50 MCG/ACT nasal spray, 1 spray into each nostril 2 (Two) Times a Day., Disp: 16 g, Rfl: 3  •  Iron-Vitamins (GERITOL PO), Take 1 tablet by mouth Daily., Disp: , Rfl:   •  pseudoephedrine (SUDAFED) 30 MG tablet, Take 30 mg by mouth Daily., Disp: , Rfl:   •  simvastatin (ZOCOR) 20 MG tablet, Take 1 tablet by mouth Daily., Disp: " ", Rfl:   •  Testosterone Cypionate 200 MG/ML kit, Inject 0.75 mL into the shoulder, thigh, or buttocks 1 (One) Time Per Week., Disp: 9 kit, Rfl: 0  •  trospium (SANCTURA) 20 MG tablet, Take 1 tablet by mouth 2 (Two) Times a Day Before Meals., Disp: 60 tablet, Rfl: 11    OBJECTIVE:  /70 (BP Location: Left arm, Patient Position: Sitting, Cuff Size: Adult)   Pulse 85   Temp 98 °F (36.7 °C)   Resp 16   Ht 182.9 cm (72\")   Wt 90 kg (198 lb 8 oz)   SpO2 98%   BMI 26.92 kg/m²    Physical Exam  Constitutional:       General: He is not in acute distress.     Appearance: Normal appearance.   HENT:      Head: Normocephalic and atraumatic.      Right Ear: External ear normal.      Left Ear: External ear normal.   Eyes:      General: No scleral icterus.     Extraocular Movements: Extraocular movements intact.   Cardiovascular:      Rate and Rhythm: Normal rate and regular rhythm.      Heart sounds: Normal heart sounds. No murmur heard.  Pulmonary:      Effort: Pulmonary effort is normal. No respiratory distress.      Breath sounds: Normal breath sounds. No wheezing.   Abdominal:      General: There is no distension.      Palpations: Abdomen is soft.      Tenderness: There is no abdominal tenderness.   Musculoskeletal:         General: Normal range of motion.      Cervical back: Normal range of motion and neck supple.      Right lower leg: No edema.      Left lower leg: No edema.   Skin:     General: Skin is warm and dry.   Neurological:      Mental Status: He is alert and oriented to person, place, and time.      Gait: Gait normal.   Psychiatric:         Mood and Affect: Mood normal.         Behavior: Behavior normal.         Assessment/Plan     Diagnoses and all orders for this visit:    1. Annual visit for general adult medical examination with abnormal findings (Primary)  Immunizations:      - Tetanus: Received in 2017      - Influenza: Recommend yearly.      - Prevnar: Once after age 65      - Shingrix: Series " after 50      - COVID: Recommended, but declined at this time. He mentions he is back and forth and would like the Moderna vaccine if he does get it.   Colonoscopy: Colonoscopy done 0 years ago with 5 year recall.  Prostate: PSA pending since he is on testosterone.     2. Suspected sleep apnea  3. Excessive somnolence disorder  4. Witnessed episode of apnea  5. Snoring  -     Home Sleep Study; Future  Home sleep study for evaluation of probable SANKET. Consideration of treatment pending results.            An After Visit Summary was printed and given to the patient at discharge.  Return in about 6 months (around 11/18/2022) for Recheck.         Fortino Ching D.O. 5/18/2022   Electronically signed.

## 2022-06-30 ENCOUNTER — HOSPITAL ENCOUNTER (OUTPATIENT)
Dept: SLEEP MEDICINE | Facility: HOSPITAL | Age: 62
Discharge: HOME | End: 2022-06-30
Admitting: INTERNAL MEDICINE

## 2022-06-30 DIAGNOSIS — G47.10 EXCESSIVE SOMNOLENCE DISORDER: ICD-10-CM

## 2022-06-30 DIAGNOSIS — R06.83 SNORING: ICD-10-CM

## 2022-06-30 DIAGNOSIS — R06.81 WITNESSED EPISODE OF APNEA: ICD-10-CM

## 2022-06-30 DIAGNOSIS — R29.818 SUSPECTED SLEEP APNEA: ICD-10-CM

## 2022-06-30 PROCEDURE — 95800 SLP STDY UNATTENDED: CPT

## 2022-06-30 PROCEDURE — 95800 SLP STDY UNATTENDED: CPT | Performed by: PSYCHIATRY & NEUROLOGY

## 2022-07-07 ENCOUNTER — TELEPHONE (OUTPATIENT)
Dept: SLEEP MEDICINE | Facility: HOSPITAL | Age: 62
End: 2022-07-07

## 2022-07-07 ENCOUNTER — TRANSCRIBE ORDERS (OUTPATIENT)
Dept: SLEEP MEDICINE | Facility: HOSPITAL | Age: 62
End: 2022-07-07

## 2022-07-07 DIAGNOSIS — G47.33 OBSTRUCTIVE SLEEP APNEA, ADULT: Primary | ICD-10-CM

## 2022-07-07 NOTE — TELEPHONE ENCOUNTER
Spoke to Mr. Arevalo and went over the results of his Home Sleep Test performed  on 6/30/2022.  Questions answered.  Dr. Jose Alfredo Antonio wrote orders for Mr. Arevalo to undergo a nocturnal CPAP trial.  The sleep center will be contacting Mr. Arevalo for an appointment.

## 2022-07-11 DIAGNOSIS — G47.33 OBSTRUCTIVE SLEEP APNEA: Primary | ICD-10-CM

## 2022-07-12 ENCOUNTER — LAB (OUTPATIENT)
Dept: LAB | Facility: HOSPITAL | Age: 62
End: 2022-07-12

## 2022-07-12 ENCOUNTER — HOSPITAL ENCOUNTER (OUTPATIENT)
Dept: SLEEP MEDICINE | Facility: HOSPITAL | Age: 62
Discharge: HOME | End: 2022-07-12

## 2022-07-12 VITALS
RESPIRATION RATE: 16 BRPM | SYSTOLIC BLOOD PRESSURE: 137 MMHG | DIASTOLIC BLOOD PRESSURE: 73 MMHG | WEIGHT: 198 LBS | HEART RATE: 89 BPM | HEIGHT: 70 IN | BODY MASS INDEX: 28.35 KG/M2

## 2022-07-12 DIAGNOSIS — G47.33 OBSTRUCTIVE SLEEP APNEA: Primary | ICD-10-CM

## 2022-07-12 DIAGNOSIS — G47.33 OBSTRUCTIVE SLEEP APNEA: ICD-10-CM

## 2022-07-12 DIAGNOSIS — G47.33 OBSTRUCTIVE SLEEP APNEA, ADULT: ICD-10-CM

## 2022-07-12 LAB — SARS-COV-2 RNA PNL SPEC NAA+PROBE: NOT DETECTED

## 2022-07-12 PROCEDURE — C9803 HOPD COVID-19 SPEC COLLECT: HCPCS

## 2022-07-12 PROCEDURE — 87635 SARS-COV-2 COVID-19 AMP PRB: CPT

## 2022-07-12 PROCEDURE — 95811 POLYSOM 6/>YRS CPAP 4/> PARM: CPT

## 2022-07-12 PROCEDURE — 95811 POLYSOM 6/>YRS CPAP 4/> PARM: CPT | Performed by: PSYCHIATRY & NEUROLOGY

## 2022-07-14 ENCOUNTER — TRANSCRIBE ORDERS (OUTPATIENT)
Dept: ADMINISTRATIVE | Facility: HOSPITAL | Age: 62
End: 2022-07-14

## 2022-07-14 DIAGNOSIS — N28.9 DECREASED RENAL FUNCTION: Primary | ICD-10-CM

## 2022-07-20 ENCOUNTER — TELEPHONE (OUTPATIENT)
Dept: SLEEP MEDICINE | Facility: HOSPITAL | Age: 62
End: 2022-07-20

## 2022-07-20 NOTE — TELEPHONE ENCOUNTER
Spoke to  Irina and went over the results of his CPAP trial performed 07/12/2022.  [facility]  The Neurology office will be contacting Mr. Arevalo for a compliance appointment with the Sleep Clinic.

## 2022-08-18 ENCOUNTER — TRANSCRIBE ORDERS (OUTPATIENT)
Dept: ADMINISTRATIVE | Facility: HOSPITAL | Age: 62
End: 2022-08-18

## 2022-08-24 ENCOUNTER — TRANSCRIBE ORDERS (OUTPATIENT)
Dept: ADMINISTRATIVE | Facility: HOSPITAL | Age: 62
End: 2022-08-24

## 2022-08-24 DIAGNOSIS — R09.89 ARTERIAL BRUIT: Primary | ICD-10-CM

## 2022-08-24 DIAGNOSIS — R68.89 FORGETFULNESS: ICD-10-CM

## 2022-09-02 ENCOUNTER — HOSPITAL ENCOUNTER (OUTPATIENT)
Dept: ULTRASOUND IMAGING | Facility: HOSPITAL | Age: 62
Discharge: HOME | End: 2022-09-02

## 2022-09-02 ENCOUNTER — HOSPITAL ENCOUNTER (OUTPATIENT)
Dept: CT IMAGING | Facility: HOSPITAL | Age: 62
Discharge: HOME | End: 2022-09-02

## 2022-09-02 DIAGNOSIS — R09.89 ARTERIAL BRUIT: ICD-10-CM

## 2022-09-02 DIAGNOSIS — R68.89 FORGETFULNESS: ICD-10-CM

## 2022-09-02 DIAGNOSIS — N28.9 DECREASED RENAL FUNCTION: ICD-10-CM

## 2022-09-02 PROCEDURE — 76775 US EXAM ABDO BACK WALL LIM: CPT

## 2022-09-02 PROCEDURE — 93880 EXTRACRANIAL BILAT STUDY: CPT

## 2022-09-02 PROCEDURE — 70450 CT HEAD/BRAIN W/O DYE: CPT

## 2022-11-03 ENCOUNTER — OFFICE VISIT (OUTPATIENT)
Dept: NEUROLOGY | Facility: CLINIC | Age: 62
End: 2022-11-03

## 2022-11-03 VITALS
DIASTOLIC BLOOD PRESSURE: 70 MMHG | WEIGHT: 202 LBS | SYSTOLIC BLOOD PRESSURE: 144 MMHG | HEIGHT: 72 IN | BODY MASS INDEX: 27.36 KG/M2 | HEART RATE: 80 BPM | RESPIRATION RATE: 18 BRPM

## 2022-11-03 DIAGNOSIS — G47.33 OBSTRUCTIVE SLEEP APNEA: Primary | ICD-10-CM

## 2022-11-03 DIAGNOSIS — R09.02 HYPOXIA: ICD-10-CM

## 2022-11-03 PROCEDURE — 99203 OFFICE O/P NEW LOW 30 MIN: CPT | Performed by: PSYCHIATRY & NEUROLOGY

## 2022-11-03 RX ORDER — TRAZODONE HYDROCHLORIDE 100 MG/1
100 TABLET ORAL NIGHTLY
COMMUNITY

## 2022-11-03 NOTE — PROGRESS NOTES
HISTORY: Patient has history of SANKET.  Patient had home sleep testing so AHI greater than 60 with desaturations.  Patient titrated in lab to 10 cm water pressure.  This improve his AHI to 1.2.  His low SPO2 was 90% on a titration.  PLM index mildly elevated 38.3.  Patient has not had his device 2 to 3 months and had trouble with his original mask digging into the bridge of his nose but now has a memory foam mask fullface that is more comfortable for him and his bridge of his nose is healing.  Patient is retired now doing construction dumpsters.  Patient had COVID last [place] and was in the hospital for a week with that.  He did not have to get ventilated but it seemed to change his lung capacity.  Patient recently has had some allergy problems and got a steroid shot yesterday from Dr. Noble.  [place] Sleepiness Scale is 4.  STOP-BANG is 3    EXAM: Patient blood pressure 150/72 left arm seated 144/70 left arm standing with pulse 80.  BMI is 27.4.  Neck circumference is 18 inches.  No acute fundic abnormalities noted.  Tympanic membranes normal.  No acute oropharynx abnormalities.  Mallampati 2.  No bruits/no lymphadenopathy.  No acute cardiopulmonary abnormalities by auscultation.  Abdomen nondistended with no liver edge or spleen edge felt.    IMPRESSION: Patient with severe SANKET well compensated with the CPAP device that is present.  Patient is 100% days of use per month over 6-1/2 hours of use.  Average leak is minimal and average AHI 2.3.  Patient to continue safety and driving precautions as discussed.  I spent 30 minutes with this patient with counseling exam and review of records.

## 2023-08-03 ENCOUNTER — TELEPHONE (OUTPATIENT)
Dept: NEUROLOGY | Facility: CLINIC | Age: 63
End: 2023-08-03
Payer: COMMERCIAL

## 2023-08-04 ENCOUNTER — OFFICE VISIT (OUTPATIENT)
Dept: NEUROLOGY | Facility: CLINIC | Age: 63
End: 2023-08-04
Payer: COMMERCIAL

## 2023-08-04 VITALS
WEIGHT: 204 LBS | RESPIRATION RATE: 18 BRPM | HEIGHT: 72 IN | HEART RATE: 88 BPM | BODY MASS INDEX: 27.63 KG/M2 | DIASTOLIC BLOOD PRESSURE: 80 MMHG | SYSTOLIC BLOOD PRESSURE: 150 MMHG

## 2023-08-04 DIAGNOSIS — R53.83 FATIGUE, UNSPECIFIED TYPE: ICD-10-CM

## 2023-08-04 DIAGNOSIS — R25.8 NOCTURNAL LEG MOVEMENTS: ICD-10-CM

## 2023-08-04 DIAGNOSIS — G47.33 OSA ON CPAP: Primary | ICD-10-CM

## 2023-08-04 DIAGNOSIS — Z99.89 OSA ON CPAP: Primary | ICD-10-CM

## 2023-08-04 PROCEDURE — 99214 OFFICE O/P EST MOD 30 MIN: CPT | Performed by: PSYCHIATRY & NEUROLOGY

## 2023-08-04 RX ORDER — MONTELUKAST SODIUM 10 MG/1
10 TABLET ORAL NIGHTLY
COMMUNITY

## (undated) DEVICE — Device: Brand: DEFENDO AIR/WATER/SUCTION AND BIOPSY VALVE

## (undated) DEVICE — SENSR O2 OXIMAX FNGR A/ 18IN NONSTR

## (undated) DEVICE — TBG SMPL FLTR LINE NASL 02/C02 A/ BX/100

## (undated) DEVICE — CUFF,BP,DISP,1 TUBE,ADULT,HP: Brand: MEDLINE

## (undated) DEVICE — MASK,OXYGEN,MED CONC,ADLT,7' TUB, UC: Brand: PENDING

## (undated) DEVICE — YANKAUER,BULB TIP WITH VENT: Brand: ARGYLE

## (undated) DEVICE — THE CHANNEL CLEANING BRUSH IS A NYLON FLEXI BRUSH ATTACHED TO A FLEXIBLE PLASTIC SHEATH DESIGNED TO SAFELY REMOVE DEBRIS FROM FLEXIBLE ENDOSCOPES.